# Patient Record
Sex: FEMALE | Race: WHITE | Employment: OTHER | URBAN - METROPOLITAN AREA
[De-identification: names, ages, dates, MRNs, and addresses within clinical notes are randomized per-mention and may not be internally consistent; named-entity substitution may affect disease eponyms.]

---

## 2017-03-07 ENCOUNTER — OFFICE VISIT (OUTPATIENT)
Dept: SURGERY | Age: 82
End: 2017-03-07

## 2017-03-07 DIAGNOSIS — Z41.1 ELECTIVE PROCEDURE FOR UNACCEPTABLE COSMETIC APPEARANCE: Primary | ICD-10-CM

## 2017-03-07 PROCEDURE — MISCP2 PLASTIC CONSULT II FOLLOW-UP: Performed by: DERMATOLOGY

## 2017-03-07 RX ORDER — TOLTERODINE 4 MG/1
CAPSULE, EXTENDED RELEASE ORAL
Refills: 11 | COMMUNITY
Start: 2016-11-28

## 2017-03-07 RX ORDER — OMEPRAZOLE 40 MG/1
CAPSULE, DELAYED RELEASE ORAL
COMMUNITY
Start: 2016-12-16

## 2017-03-07 RX ORDER — LINACLOTIDE 145 UG/1
CAPSULE, GELATIN COATED ORAL
Refills: 11 | COMMUNITY
Start: 2017-01-11

## 2017-10-02 ENCOUNTER — TELEPHONE (OUTPATIENT)
Dept: PAIN MANAGEMENT | Age: 82
End: 2017-10-02

## 2017-10-02 NOTE — TELEPHONE ENCOUNTER
This patients referral has been approved for scheduling with [facility].  First message was left for the patient to call [facility] regarding their referral.

## 2017-10-02 NOTE — TELEPHONE ENCOUNTER
New appointment has been scheduled. Address was confirmed and new patient packet along with appt reminder card was mailed to the patient. Informed patient the first appointment may only be rescheduled once and no showing or giving less than 24 hour notice of inability to come to first office visit will result in a cancellation of the referral. Advised that if they arrive to the appt without completed paperwork, their appointment may be rescheduled for the next available new patient spot.

## 2018-06-26 ENCOUNTER — TELEPHONE (OUTPATIENT)
Dept: SURGERY | Age: 83
End: 2018-06-26

## 2018-07-05 ENCOUNTER — PROCEDURE VISIT (OUTPATIENT)
Dept: SURGERY | Age: 83
End: 2018-07-05

## 2018-07-05 VITALS
TEMPERATURE: 97.7 F | DIASTOLIC BLOOD PRESSURE: 65 MMHG | HEART RATE: 87 BPM | WEIGHT: 140 LBS | OXYGEN SATURATION: 95 % | SYSTOLIC BLOOD PRESSURE: 124 MMHG | BODY MASS INDEX: 24.03 KG/M2

## 2018-07-05 DIAGNOSIS — L57.0 ACTINIC KERATOSIS: ICD-10-CM

## 2018-07-05 DIAGNOSIS — D04.39 SQUAMOUS CELL CARCINOMA IN SITU OF SKIN OF RIGHT CHEEK: Primary | ICD-10-CM

## 2018-07-05 PROCEDURE — 1123F ACP DISCUSS/DSCN MKR DOCD: CPT | Performed by: DERMATOLOGY

## 2018-07-05 PROCEDURE — G8420 CALC BMI NORM PARAMETERS: HCPCS | Performed by: DERMATOLOGY

## 2018-07-05 PROCEDURE — 17311 MOHS 1 STAGE H/N/HF/G: CPT | Performed by: DERMATOLOGY

## 2018-07-05 PROCEDURE — G8428 CUR MEDS NOT DOCUMENT: HCPCS | Performed by: DERMATOLOGY

## 2018-07-05 PROCEDURE — 1090F PRES/ABSN URINE INCON ASSESS: CPT | Performed by: DERMATOLOGY

## 2018-07-05 PROCEDURE — 99212 OFFICE O/P EST SF 10 MIN: CPT | Performed by: DERMATOLOGY

## 2018-07-05 PROCEDURE — 4040F PNEUMOC VAC/ADMIN/RCVD: CPT | Performed by: DERMATOLOGY

## 2018-07-05 PROCEDURE — 1036F TOBACCO NON-USER: CPT | Performed by: DERMATOLOGY

## 2018-07-05 PROCEDURE — 12052 INTMD RPR FACE/MM 2.6-5.0 CM: CPT | Performed by: DERMATOLOGY

## 2018-07-05 NOTE — PATIENT INSTRUCTIONS
[facility] Office Hours:    Monday-Thursday  7:30 AM-4:30 PM    Friday  9:00 AM-3:00 PM    Patient was given post operative instructions for dissolving sutures with liquid skin adhesive. All questions were reviewed.

## 2018-07-05 NOTE — PROGRESS NOTES
S: pt c/o multiple rough red spots on right cheek especially. She has had one IPL tx at another facility +to treat these but is interested in other options. O:  On the right cheek are multiple ill defined gritty pink papules. O  We discussed the relation to chronic cumulative sun exposure and the low premalignant potential.   Given the # of lesions, tx is recommended. We discussed various tx options including topical therapeutic agents such as efudex and carac as well as photodynamic therapy. Given number of lesions, the patient opts for PDT. She will call back in the fall to get put on the PDT schedule. rec 2 hour incubation and 2 tx total. . F/u 1 month after tx completion to determine need for any further tx based on outcome.
using the technique of Mohs. A map was prepared to correspond to the area of skin from which it was excised. Hemostasis was achieved using electrosurgery. The wound was bandaged. The tissue was prepared for the cryostat and sectioned. 1 section(s) prepared. Each section was coded, cut, and stained for microscopic examination. The entire base and margins of the excised piece of tissue were examined by the surgeon. No tumor was identified at the peripheral margins of stage I of microscopically controlled surgery. DEFECT MANAGEMENT:    REPAIR DESCRIPTION:  Various closure modalities were discussed with the patient, and it was decided that an intermediate layered repair would best preserve normal anatomic and functional relationships. Additional risk of wound dehiscence was discussed. The area was anesthetized with 1% lidocaine with epinephrine 1:100,000 buffered, was given a sterile prep using Chlorhexidine gluconate 4% solution and draped in the usual sterile fashion. Recreation and enlargement of the wound was performed by excising cones of tissue via the triangulation technique. The final incision lines were placed with respect for the patient's natural skin tension lines in a linear configuration to avoid functional and aesthetic distortion of adjacent free margins. Following minimal undermining, meticulous hemostasis was obtained with spot monopolar electrocoagulation. Subcutaneous dead space and dermis were closed using 5-0 Vicryl buried subcutaneous interrupted suture and the epidermis was approximated with 6-0 Fast absorbing gut running epidermal sutures. WOUND COVERAGE:  The wound was cleansed with normal saline and dried. Liquid skin adhesive was applied for wound protection and additional epidermal adhesion. The patient was given detailed oral and written information on post-operative care. There were no complications.   The patient left the Unit in good medical

## 2018-07-06 ENCOUNTER — TELEPHONE (OUTPATIENT)
Dept: SURGERY | Age: 83
End: 2018-07-06

## 2018-07-12 ENCOUNTER — TELEPHONE (OUTPATIENT)
Dept: SURGERY | Age: 83
End: 2018-07-12

## 2018-07-12 NOTE — TELEPHONE ENCOUNTER
Patient called office at 12:00 PM to clarify instructions for liquid skin adhesive. Patient was instructed to gently cleanse the area with a mild soap and allow adhesive to flake off on its own. Patient verbalized understanding of skin adhesive care.

## 2018-07-17 ENCOUNTER — OFFICE VISIT (OUTPATIENT)
Dept: SURGERY | Age: 83
End: 2018-07-17

## 2018-07-17 DIAGNOSIS — L90.5 SCAR CONDITION AND FIBROSIS OF SKIN: Primary | ICD-10-CM

## 2018-07-17 DIAGNOSIS — L57.0 ACTINIC KERATOSIS: ICD-10-CM

## 2018-07-17 DIAGNOSIS — L82.1 SEBORRHEIC KERATOSIS: ICD-10-CM

## 2018-07-17 PROBLEM — Z51.89 VISIT FOR WOUND CHECK: Status: ACTIVE | Noted: 2018-07-17

## 2018-07-17 PROCEDURE — 1036F TOBACCO NON-USER: CPT | Performed by: DERMATOLOGY

## 2018-07-17 PROCEDURE — 4040F PNEUMOC VAC/ADMIN/RCVD: CPT | Performed by: DERMATOLOGY

## 2018-07-17 PROCEDURE — 99212 OFFICE O/P EST SF 10 MIN: CPT | Performed by: DERMATOLOGY

## 2018-07-17 PROCEDURE — G8427 DOCREV CUR MEDS BY ELIG CLIN: HCPCS | Performed by: DERMATOLOGY

## 2018-07-17 PROCEDURE — 1090F PRES/ABSN URINE INCON ASSESS: CPT | Performed by: DERMATOLOGY

## 2018-07-17 PROCEDURE — 1123F ACP DISCUSS/DSCN MKR DOCD: CPT | Performed by: DERMATOLOGY

## 2018-07-17 PROCEDURE — G8420 CALC BMI NORM PARAMETERS: HCPCS | Performed by: DERMATOLOGY

## 2018-07-17 PROCEDURE — 1101F PT FALLS ASSESS-DOCD LE1/YR: CPT | Performed by: DERMATOLOGY

## 2018-07-17 NOTE — PATIENT INSTRUCTIONS
Wound Care Massaging Instruction    Starting at approximately two weeks following surgery, we often ask that our patients begin massaging their wound on a regular basis. We suggest 5 minutes every morning and 5 minutes every night using an emollient such as Aquaphor, Vaseline or Eucerin cream.  The sutures that were placed underneath the surface of the skin take about 70 days to dissolve, and during that time, they can cause lumps along the line of the scar. These lumps will eventually go away on their own, but the use of regular massage will help speed the process as well as help soften the scar tissue more quickly. Please continue to use sunscreen, SPF 45 or higher during this time.     [facility] Office Hours:    Monday-Thursday  7:30 AM-4:30 PM    Friday  9:00 AM-3:00 PM

## 2018-07-17 NOTE — PROGRESS NOTES
S: The patient is here today for wound/scar check. The patient had Mohs surgery located on the Right zygomatic with Intermediate layered closure repair, 1.5 week(s) ago. The patient c/o of concerns of a long scar line.     EXAM: ***    IMPRESSION/PLAN:  ***

## 2019-06-20 ENCOUNTER — TELEPHONE (OUTPATIENT)
Dept: ENDOCRINOLOGY | Age: 84
End: 2019-06-20

## 2019-06-20 NOTE — TELEPHONE ENCOUNTER
New patient referral. Please send a Health History Form to return to my attention and let them know I will look for appt day and time after receiving it. Thanks.     Referred by Dr. Dennise Morfin

## 2019-06-30 ENCOUNTER — TELEPHONE (OUTPATIENT)
Dept: ENDOCRINOLOGY | Age: 84
End: 2019-06-30

## 2019-08-29 ENCOUNTER — OFFICE VISIT (OUTPATIENT)
Dept: ENDOCRINOLOGY | Age: 84
End: 2019-08-29
Payer: MEDICARE

## 2019-08-29 VITALS
HEIGHT: 61 IN | SYSTOLIC BLOOD PRESSURE: 116 MMHG | BODY MASS INDEX: 25.83 KG/M2 | DIASTOLIC BLOOD PRESSURE: 70 MMHG | WEIGHT: 136.8 LBS

## 2019-08-29 DIAGNOSIS — M80.08XD VERTEBRAL FRACTURE, OSTEOPOROTIC, WITH ROUTINE HEALING, SUBSEQUENT ENCOUNTER: ICD-10-CM

## 2019-08-29 DIAGNOSIS — M81.0 OSTEOPOROSIS, POSTMENOPAUSAL: Primary | ICD-10-CM

## 2019-08-29 DIAGNOSIS — E55.9 VITAMIN D DEFICIENCY: ICD-10-CM

## 2019-08-29 PROCEDURE — 1123F ACP DISCUSS/DSCN MKR DOCD: CPT | Performed by: INTERNAL MEDICINE

## 2019-08-29 PROCEDURE — 1090F PRES/ABSN URINE INCON ASSESS: CPT | Performed by: INTERNAL MEDICINE

## 2019-08-29 PROCEDURE — G8419 CALC BMI OUT NRM PARAM NOF/U: HCPCS | Performed by: INTERNAL MEDICINE

## 2019-08-29 PROCEDURE — 4040F PNEUMOC VAC/ADMIN/RCVD: CPT | Performed by: INTERNAL MEDICINE

## 2019-08-29 PROCEDURE — 1036F TOBACCO NON-USER: CPT | Performed by: INTERNAL MEDICINE

## 2019-08-29 PROCEDURE — 99205 OFFICE O/P NEW HI 60 MIN: CPT | Performed by: INTERNAL MEDICINE

## 2019-08-29 PROCEDURE — G8427 DOCREV CUR MEDS BY ELIG CLIN: HCPCS | Performed by: INTERNAL MEDICINE

## 2019-08-29 NOTE — LETTER
[address] [facility]  [address] [facility] [address] [facility], [address] [place] [address],[place] [address]  Phone [phone]  Fax [phone]         2019         Jackie Springer MD                            Re:  Barbie Shearer,  1930    Dear Dr. Meena Love: Thank you for asking me to see Barbie Shearer in consultation. As you know, Ms. Marizol Bautista is a 80 y. o.woman diagnosed with osteoporosis some years ago. She has had multiple fractures. We reviewed life-style issues (calcium, vitamin D and physical activity). Without effective treatment, risk of future fracture is high. Several years ago she took Prolia but had a hiatus ~-2019 when she started back on Prolia at your office. I recommended that she continue  Prolia 60 mg SQ every 6 months and emphasized the need to stay on therapy indefinitely. Enclosed is a copy of my consultation note. Please let me know if you have any questions. Sincerely,    Galileo Arellaon MD     Encl.  Copy of consult note

## 2021-08-13 ENCOUNTER — HOSPITAL ENCOUNTER (EMERGENCY)
Age: 86
Discharge: HOME | End: 2021-08-13
Attending: STUDENT IN AN ORGANIZED HEALTH CARE EDUCATION/TRAINING PROGRAM
Payer: MEDICARE

## 2021-08-13 ENCOUNTER — APPOINTMENT (OUTPATIENT)
Dept: CT IMAGING | Age: 86
End: 2021-08-13
Payer: MEDICARE

## 2021-08-13 ENCOUNTER — APPOINTMENT (OUTPATIENT)
Dept: GENERAL RADIOLOGY | Age: 86
End: 2021-08-13
Payer: MEDICARE

## 2021-08-13 VITALS
HEART RATE: 73 BPM | RESPIRATION RATE: 19 BRPM | SYSTOLIC BLOOD PRESSURE: 135 MMHG | OXYGEN SATURATION: 99 % | TEMPERATURE: 97.8 F | DIASTOLIC BLOOD PRESSURE: 73 MMHG

## 2021-08-13 DIAGNOSIS — S09.90XA CLOSED HEAD INJURY, INITIAL ENCOUNTER: Primary | ICD-10-CM

## 2021-08-13 PROCEDURE — 99283 EMERGENCY DEPT VISIT LOW MDM: CPT

## 2021-08-13 PROCEDURE — 6370000000 HC RX 637 (ALT 250 FOR IP): Performed by: STUDENT IN AN ORGANIZED HEALTH CARE EDUCATION/TRAINING PROGRAM

## 2021-08-13 PROCEDURE — 73502 X-RAY EXAM HIP UNI 2-3 VIEWS: CPT

## 2021-08-13 PROCEDURE — 72125 CT NECK SPINE W/O DYE: CPT

## 2021-08-13 PROCEDURE — 70450 CT HEAD/BRAIN W/O DYE: CPT

## 2021-08-13 RX ORDER — ACETAMINOPHEN 325 MG/1
650 TABLET ORAL ONCE
Status: COMPLETED | OUTPATIENT
Start: 2021-08-13 | End: 2021-08-13

## 2021-08-13 RX ADMIN — ACETAMINOPHEN 650 MG: 325 TABLET ORAL at 15:10

## 2021-08-13 ASSESSMENT — ENCOUNTER SYMPTOMS
RESPIRATORY NEGATIVE: 1
VOMITING: 0
NAUSEA: 0
EYES NEGATIVE: 1

## 2021-08-13 ASSESSMENT — PAIN SCALES - GENERAL: PAINLEVEL_OUTOF10: 6

## 2021-08-13 NOTE — ED PROVIDER NOTES
ED Attending Attestation Note     Date of evaluation: 8/13/2021    This patient was seen by the advance practice provider. I have seen and examined the patient, agree with the workup, evaluation, management and diagnosis. The care plan has been discussed. My assessment reveals a well-appearing woman who provides a history of a mechanical fall due to a wastebasket in her bathroom. She did strike her head but is not anticoagulated. She denies any LOC or preceding symptoms before the fall. She has no numbness, weakness, vision changes, or paresthesias currently. She endorses pain at the head. My exam is notable for: GCS 15. AAOx4. Speech is clear and fluent, with no aphasia or dysarthria. Visual fields are full to confrontation. Pupils are 4->2 mm bilaterally, round and briskly reactive to light. EOMI with no nystagmus. Facial sensation is intact in all 3 divisions bilaterally. Face is symmetric with normal eye closure and smile. Hearing is grossly intact bilaterally. Palate elevates symmetrically. Phonation is normal.   She has intact strength to shoulder abduction, finger  b/l. She has intact strength to hip flexion/extension and ankle plantar/dorsiflexion. No pronator drift, LE AG [id number] b/l. SILT. Finger-to-nose normal bilaterally. We will proceed with imaging of areas with pain on secondary and, given advanced age and head strike, cross-sectional imaging of the head and neck. Please note this note was addended after inadvertent entry of unrelated text.        Vivian Paige MD  08/13/21 2128

## 2021-08-13 NOTE — ED PROVIDER NOTES
[facility]  EMERGENCY DEPARTMENT ENCOUNTER          NURSE PRACTITIONER NOTE       Date of evaluation: 2021    Chief Complaint     Fall (states she tripped and fell in her bathroom, states she hit her head on the bathroom floor. denies LOC, no anticoags. states her head is \"sore\")      History of Present Illness     Abbie Guevara is a 80 y.o. female past medical history as noted below; who presents to the emergency department with a complaint of a fall. Patient states that she tripped in her bathroom and fell striking her left head against the ground, and injuring her left hip. Patient initially had significant dizziness and was \"seeing spots\" she was able to crawl out of the bathroom, and get dressed, then was able to ambulate out to her family's car to bring her to the emergency department. She actually went to urgent care first, who instructed her to come to the emergency department as they cannot get advanced imaging. Patient denies anticoagulant use. Denies any current dizziness, lightheadedness, vision changes, nausea or vomiting. Denies numbness or tingling of extremities. Review of Systems     Review of Systems   Constitutional: Negative. Eyes: Negative. Respiratory: Negative. Cardiovascular: Negative. Gastrointestinal: Negative for nausea and vomiting. Musculoskeletal: Positive for arthralgias (left hip). Skin: Negative. Allergic/Immunologic: Negative for immunocompromised state. Neurological: Positive for dizziness (initially after fall, none currently) and headaches. Hematological: Does not bruise/bleed easily. Past Medical, Surgical, Family, and Social History     She has a past medical history of Cancer ([address] Hepatitis, Hyperlipidemia, and Osteoarthritis. She has a past surgical history that includes Mastectomy; Breast lumpectomy; and  section. Her family history includes Arthritis in an other family member; Cancer in her mother.   She reports that she has quit smoking. She has never used smokeless tobacco. She reports current alcohol use. She reports that she does not use drugs. Medications     Discharge Medication List as of 8/13/2021  4:13 PM      CONTINUE these medications which have NOT CHANGED    Details   LINZESS 145 MCG capsule TK 1 C PO QAM, R-11, DAWHistorical Med      omeprazole (PRILOSEC) 40 MG delayed release capsule Take one 30 minutes before breakfast, and 30 minutes before dinnerHistorical Med      tolterodine (DETROL LA) 4 MG extended release capsule TK 1 C PO D, R-11Historical Med      fluocinonide (LIDEX) 0.05 % external solution Apply sparingly to scalp qd prn., Disp-60 mL, R-2, Normal      naproxen (NAPROSYN) 500 MG tablet TAKE 1 TABLET BY MOUTH TWICE DAILY WITH MEALS, Disp-60 tablet, R-0      !! buPROPion SR (WELLBUTRIN SR) 150 MG SR tablet TAKE 1 TABLET BY MOUTH TWICE DAILY      PHENAZOPYRIDINE HCL PO Take by mouth      ! ! Mirabegron ER 25 MG TB24 Take 25 mg by mouth      !! mirtazapine (REMERON) 45 MG tablet TAKE 1 TABLET BY MOUTH EVERY NIGHT AT BEDTIME      !! simvastatin (ZOCOR) 20 MG tablet Take 20 mg by mouth      meloxicam (MOBIC) 15 MG tablet Take 1 tablet by mouth daily, Disp-30 tablet, R-0      Homeopathic Products (AZO CONFIDENCE PO) Take by mouth daily      oxyCODONE (ROXICODONE) 5 MG immediate release tablet Take 2 tablets by mouth every 6 hours as needed for Pain, Disp-30 tablet, R-0      !! buPROPion SR (WELLBUTRIN SR) 150 MG SR tablet daily , R-8      clonazePAM (KLONOPIN) 1 MG tablet Half tab at bedtime as needed      denosumab (PROLIA) 60 MG/ML SOLN SC injection Inject 60 mg into the skin      levothyroxine (SYNTHROID) 88 MCG tablet TAKE 1 TABLET BY MOUTH DAILY      hyoscyamine (LEVSIN/SL) 0.125 MG SL tablet Take 125 mcg by mouth      minoxidil (ROGAINE) 2 % external solution Apply twice daily, Historical Med      !!  Mirabegron ER (MYRBETRIQ) 25 MG TB24 Take 25 mg by mouth      !! mirtazapine (REMERON) 45 MG tablet TAKE 1 TABLET BY MOUTH EVERY NIGHT AT BEDTIME      sennosides-docusate sodium (SENOKOT-S) 8.6-50 MG tablet Take by mouth      !! simvastatin (ZOCOR) 20 MG tablet TAKE 1 TABLET BY MOUTH EVERY EVENING       ! ! - Potential duplicate medications found. Please discuss with provider. Allergies     She is allergic to no known allergies. Physical Exam     INITIAL VITALS: BP: 135/89, Temp: 97.8 °F (36.6 °C), Pulse: 73, Resp: 19, SpO2: 99 %   Physical Exam  Vitals and nursing note reviewed. Constitutional:       Appearance: Normal appearance. Eyes:      Extraocular Movements: Extraocular movements intact. Pupils: Pupils are equal, round, and reactive to light. Cardiovascular:      Rate and Rhythm: Normal rate and regular rhythm. Pulses: Normal pulses. Heart sounds: Normal heart sounds. Pulmonary:      Effort: Pulmonary effort is normal.      Breath sounds: Normal breath sounds. Musculoskeletal:         General: Normal range of motion. Neurological:      General: No focal deficit present. Mental Status: She is alert and oriented to person, place, and time. Cranial Nerves: No cranial nerve deficit. Psychiatric:         Mood and Affect: Mood normal.         Behavior: Behavior normal.           Diagnostic Results     RADIOLOGY:  CT CERVICAL SPINE WO CONTRAST   Final Result      Degenerative changes without acute traumatic normality. CT HEAD WO CONTRAST   Final Result      Left scalp contusion without intracranial hemorrhage or skull fracture. XR HIP 2-3 VW W PELVIS LEFT   Final Result      2 views demonstrate no fracture or dislocation. No significant arthritis. Right hip screws are noted. LABS:   No results found for this visit on 08/13/21.       RECENT VITALS:  BP: 135/73, Temp: 97.8 °F (36.6 °C), Pulse: 73, Resp: 19, SpO2: 99 %       ED Course     Nursing Notes, Past Medical Hx, Past Surgical Hx, Social Hx, Allergies, and Family Hx were

## 2022-09-11 ENCOUNTER — APPOINTMENT (OUTPATIENT)
Dept: CT IMAGING | Age: 87
End: 2022-09-11
Payer: MEDICARE

## 2022-09-11 ENCOUNTER — APPOINTMENT (OUTPATIENT)
Dept: GENERAL RADIOLOGY | Age: 87
End: 2022-09-11
Payer: MEDICARE

## 2022-09-11 ENCOUNTER — HOSPITAL ENCOUNTER (EMERGENCY)
Age: 87
Discharge: HOME | End: 2022-09-11
Attending: STUDENT IN AN ORGANIZED HEALTH CARE EDUCATION/TRAINING PROGRAM
Payer: MEDICARE

## 2022-09-11 VITALS
TEMPERATURE: 98 F | SYSTOLIC BLOOD PRESSURE: 148 MMHG | RESPIRATION RATE: 20 BRPM | HEART RATE: 85 BPM | OXYGEN SATURATION: 100 % | DIASTOLIC BLOOD PRESSURE: 88 MMHG

## 2022-09-11 DIAGNOSIS — S01.81XA LACERATION OF FOREHEAD, INITIAL ENCOUNTER: ICD-10-CM
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
## 2022-09-11 PROCEDURE — 72125 CT NECK SPINE W/O DYE: CPT

## 2022-09-11 PROCEDURE — 99284 EMERGENCY DEPT VISIT MOD MDM: CPT

## 2022-09-11 PROCEDURE — 70450 CT HEAD/BRAIN W/O DYE: CPT

## 2022-09-11 PROCEDURE — 12015 RPR F/E/E/N/L/M 7.6-12.5 CM: CPT

## 2022-09-11 PROCEDURE — 73562 X-RAY EXAM OF KNEE 3: CPT

## 2022-09-11 PROCEDURE — 2500000003 HC RX 250 WO HCPCS: Performed by: PHYSICIAN ASSISTANT

## 2022-09-11 RX ORDER — LIDOCAINE HYDROCHLORIDE 10 MG/ML
5 INJECTION, SOLUTION EPIDURAL; INFILTRATION; INTRACAUDAL; PERINEURAL ONCE
Status: COMPLETED | OUTPATIENT
Start: 2022-09-11 | End: 2022-09-11

## 2022-09-11 RX ADMIN — LIDOCAINE HYDROCHLORIDE 5 ML: 10 INJECTION, SOLUTION EPIDURAL; INFILTRATION; INTRACAUDAL; PERINEURAL at 17:47

## 2022-09-11 ASSESSMENT — ENCOUNTER SYMPTOMS
PHOTOPHOBIA: 0
ABDOMINAL PAIN: 0
NAUSEA: 0
BACK PAIN: 0
EYE PAIN: 0
COUGH: 0
DIARRHEA: 0
VOMITING: 0
SHORTNESS OF BREATH: 0

## 2022-09-11 ASSESSMENT — LIFESTYLE VARIABLES: HOW OFTEN DO YOU HAVE A DRINK CONTAINING ALCOHOL: 2-4 TIMES A MONTH

## 2022-09-11 NOTE — ED TRIAGE NOTES
Pt had mechanical fall where she tripped over her shoe and hit her head on the corner of the wall. Has head lac about 4 in long and 1/4in-3/4in wide. Wrapped applied.  C/o head and neck pain

## 2022-09-11 NOTE — ED PROVIDER NOTES
ED Attending Attestation Note     Date of evaluation: 9/11/2022    This patient was seen by the advance practice provider. I have seen and examined the patient, agree with the workup, evaluation, management and diagnosis. The care plan has been discussed. My assessment reveals a well-appearing 70-year-old female who presented after a mechanical fall at home. She tripped when the rubber sole of her shoe caught on the wood floor, and fell forward striking her head on the corner of a wall. She did not lose consciousness and is not anticoagulated. She has been ambulatory since the fall. She is complaining of some mild left knee pain as well as pain associated with a vertical laceration on her forehead. Her tetanus is up-to-date. I was present for the laceration repair as performed by the [facility].      Lena Duncan MD  09/11/22 0498

## 2022-09-11 NOTE — ED PROVIDER NOTES
section. Her family history includes Arthritis in an other family member; Cancer in her mother. She reports that she has quit smoking. She has never used smokeless tobacco. She reports current alcohol use of about 1.0 standard drink per week. She reports that she does not use drugs. Medications     Previous Medications    BUPROPION SR (WELLBUTRIN SR) 150 MG SR TABLET    daily     BUPROPION SR (WELLBUTRIN SR) 150 MG SR TABLET    TAKE 1 TABLET BY MOUTH TWICE DAILY    CLONAZEPAM (KLONOPIN) 1 MG TABLET    Half tab at bedtime as needed    DENOSUMAB (PROLIA) 60 MG/ML SOLN SC INJECTION    Inject 60 mg into the skin    FLUOCINONIDE (LIDEX) 0.05 % EXTERNAL SOLUTION    Apply sparingly to scalp qd prn.     HOMEOPATHIC PRODUCTS (AZO CONFIDENCE PO)    Take by mouth daily    HYOSCYAMINE (LEVSIN/SL) 0.125 MG SL TABLET    Take 125 mcg by mouth    LEVOTHYROXINE (SYNTHROID) 88 MCG TABLET    TAKE 1 TABLET BY MOUTH DAILY    LINZESS 145 MCG CAPSULE    TK 1 C PO QAM    MELOXICAM (MOBIC) 15 MG TABLET    Take 1 tablet by mouth daily    MINOXIDIL (ROGAINE) 2 % EXTERNAL SOLUTION    Apply twice daily    MIRABEGRON ER (MYRBETRIQ) 25 MG TB24    Take 25 mg by mouth    MIRABEGRON ER 25 MG TB24    Take 25 mg by mouth    MIRTAZAPINE (REMERON) 45 MG TABLET    TAKE 1 TABLET BY MOUTH EVERY NIGHT AT BEDTIME    MIRTAZAPINE (REMERON) 45 MG TABLET    TAKE 1 TABLET BY MOUTH EVERY NIGHT AT BEDTIME    NAPROXEN (NAPROSYN) 500 MG TABLET    TAKE 1 TABLET BY MOUTH TWICE DAILY WITH MEALS    OMEPRAZOLE (PRILOSEC) 40 MG DELAYED RELEASE CAPSULE    Take one 30 minutes before breakfast, and 30 minutes before dinner    OXYCODONE (ROXICODONE) 5 MG IMMEDIATE RELEASE TABLET    Take 2 tablets by mouth every 6 hours as needed for Pain    PHENAZOPYRIDINE HCL PO    Take by mouth    SENNOSIDES-DOCUSATE SODIUM (SENOKOT-S) 8.6-50 MG TABLET    Take by mouth    SIMVASTATIN (ZOCOR) 20 MG TABLET    TAKE 1 TABLET BY MOUTH EVERY EVENING    SIMVASTATIN (ZOCOR) 20 MG TABLET Take 20 mg by mouth    TOLTERODINE (DETROL LA) 4 MG EXTENDED RELEASE CAPSULE    TK 1 C PO D       Allergies     She is allergic to no known allergies. Physical Exam     INITIAL VITALS: BP: (!) 145/73, Temp: 98 °F (36.7 °C), Heart Rate: 85, Resp: 16, SpO2: 100 %  Physical Exam  Constitutional:       General: She is not in acute distress. Appearance: She is well-developed. HENT:      Head: Normocephalic and atraumatic. Eyes:      Pupils: Pupils are equal, round, and reactive to light. Cardiovascular:      Rate and Rhythm: Normal rate and regular rhythm. Heart sounds: No murmur heard. No friction rub. No gallop. Pulmonary:      Effort: Pulmonary effort is normal. No respiratory distress. Breath sounds: Normal breath sounds. Abdominal:      Palpations: Abdomen is soft. Tenderness: no abdominal tenderness   Musculoskeletal:         General: Normal range of motion. Cervical back: Normal range of motion and neck supple. Skin:     General: Skin is warm and dry. Comments: 8cm laceration to patients forehead. Wound hemostatic   Neurological:      Mental Status: She is alert and oriented to person, place, and time. Diagnostic Results     RADIOLOGY:  CT CSpine W/O Contrast   Final Result   1. No evidence for acute intracranial process. No bleed or shift   2. Frontal temporal atrophy            CT cervical spine without contrast      TECHNIQUE: Collimated helical images are made from the skull base through T1 in a bone algorithm. CT performed on a current scanner low-dose x-ray techniques. Findings CT cervical spine without contrast:      1.5 mm of anterolisthesis of C4 on C5 is identified. There is marked joint space during ossify formation throughout the cervical spine. The vertebral body heights well-maintained.  Evidence of slight retrolisthesis of the clivus relative to the odontoid,    however this configuration is stable when compared to prior sagittal views of Prepatellar bursitis   2. Tricompartmental osteoarthritis, greatest involving the lateral compartment   3. Atherosclerotic calcifications          LABS:   No results found for this visit on 09/11/22. RECENT VITALS:  BP: (!) 145/73, Temp: 98 °F (36.7 °C), Heart Rate: 85, Resp: 16, SpO2: 100 %     Procedures     Lac Repair    Date/Time: 9/11/2022 7:56 PM  Performed by: Kan Brooks PA-C  Authorized by:  Ragena Bernheim, MD     Consent:     Consent obtained:  Verbal    Consent given by:  Patient    Risks, benefits, and alternatives were discussed: yes      Risks discussed:  Infection, poor cosmetic result and need for additional repair  Universal protocol:     Procedure explained and questions answered to patient or proxy's satisfaction: yes      Relevant documents present and verified: yes      Test results available: yes      Imaging studies available: yes      Immediately prior to procedure, a time out was called: yes      Patient identity confirmed:  Verbally with patient, hospital-assigned identification number and arm band  Anesthesia:     Anesthesia method:  Local infiltration    Local anesthetic:  Lidocaine 1% w/o epi  Laceration details:     Location:  Face    Face location:  Forehead    Length (cm):  9  Pre-procedure details:     Preparation:  Patient was prepped and draped in usual sterile fashion  Exploration:     Hemostasis achieved with:  Direct pressure    Wound exploration: entire depth of wound visualized      Wound extent: muscle damage      Contaminated: no    Treatment:     Area cleansed with:  Chlorhexidine    Amount of cleaning:  Standard    Irrigation solution:  Sterile saline    Irrigation volume:  1L    Irrigation method:  Tap    Layers/structures repaired:  Muscle belly  Muscle belly:     Suture size:  5-0    Suture material:  Vicryl    Suture technique:  Simple interrupted    Number of sutures:  4  Skin repair:     Repair method:  Sutures    Suture size:  5-0    Suture material: Nylon    Suture technique:  Simple interrupted    Number of sutures:  10  Approximation:     Approximation:  Close  Repair type:     Repair type:  Simple  Post-procedure details:     Procedure completion:  Tolerated well, no immediate complications      ED Course     Nursing Notes, Past Medical Hx,Past Surgical Hx, Social Hx, Allergies, and Family Hx were reviewed. The patient was given the following medications:  Orders Placed This Encounter   Medications    lidocaine PF 1 % injection 5 mL       CONSULTS:  None    MEDICAL DECISION MAKING / ASSESSMENT / Jane Alysha is a 80 y.o. female who presents the emergency department after a mechanical fall. Vital signs stable on presentation remained stable throughout her stay. Thorough history and physical exam performed and detailed above. Patient presents the emergency department with a mechanical fall as detailed in HPI. She states that her shoe got caught on the hardwood floor and she fell forward, striking her head. Denies loss of consciousness. She endorses pain to her forehead and mild pain to her neck. She has also noticed some soreness to her left knee, although has been ambulatory. On physical exam she does have an 8 to 9 cm laceration in the middle of her forehead. She is able to raise eyebrows and close her eyes equally. Sensation intact throughout the entirety of the face. No bony tenderness throughout facial bones. No midline tenderness throughout cervical, thoracic, lumbar spine. Mild reproducible tenderness to bilateral trapezius muscles. She does have some mild tenderness to her left knee, although continues to have full range of motion. No other tenderness of extremities. No tenderness throughout chest wall or abdomen. CT of the head showed no evidence of acute intracranial process.   CT of the cervical spine showed slight retrolisthesis of the clivus relative to C2 dens, however stable from previous imaging therefore felt to be nonacute. X-ray of the left knee showed prepatellar bursitis without acute osseous abnormality. Laceration to the forehead was repaired as detailed above. Wound was thoroughly cleaned and irrigated with normal saline chlorhexidine prior to repair. 4 Vicryl sutures were used to reapproximate the frontalis muscle. 10 nylon sutures were then used to approximate the skin. Patient tolerated the procedure well without any complication. At this time, low suspicion for acute intracranial abnormality given reassuring imaging and normal neurologic examination. Low suspicion for acute osseous abnormality given benign physical exam and reassuring imaging. At this time, I do believe she is stable for discharge. She was told to follow-up in 5 to 7 days for removal of the sutures. Plan was thoroughly discussed with the patient and her son who are agreeable. They are given strict return precautions prior to discharge. This patient was also evaluated by the attending physician. All care plans were discussed and agreed upon. Clinical Impression     1. Fall, initial encounter    2.  Laceration of forehead, initial encounter        Disposition     PATIENT REFERRED TO:  Dulce Maria Crisostomo    Schedule an appointment as soon as possible for a visit       [facility] Emergency Department  [address] [place] [id number]  [phone]  Go to   If symptoms worsen    DISCHARGE MEDICATIONS:  New Prescriptions    No medications on file       DISPOSITION Decision To Discharge 09/11/2022 08:05:05 PM       Wilber Denis PA-C  09/11/22 2013

## 2023-01-12 ENCOUNTER — ANESTHESIA EVENT (OUTPATIENT)
Dept: OPERATING ROOM | Age: 88
End: 2023-01-12
Payer: MEDICARE

## 2023-01-12 ENCOUNTER — APPOINTMENT (OUTPATIENT)
Dept: GENERAL RADIOLOGY | Age: 88
DRG: 480 | End: 2023-01-12
Payer: MEDICARE

## 2023-01-12 ENCOUNTER — ANESTHESIA (OUTPATIENT)
Dept: OPERATING ROOM | Age: 88
End: 2023-01-12
Payer: MEDICARE

## 2023-01-12 ENCOUNTER — APPOINTMENT (OUTPATIENT)
Dept: CT IMAGING | Age: 88
DRG: 480 | End: 2023-01-12
Payer: MEDICARE

## 2023-01-12 ENCOUNTER — HOSPITAL ENCOUNTER (INPATIENT)
Age: 88
LOS: 11 days | Discharge: NURSING, REHAB OR LONG-TERM CARE | DRG: 480 | End: 2023-01-23
Attending: EMERGENCY MEDICINE | Admitting: INTERNAL MEDICINE
Payer: MEDICARE

## 2023-01-12 DIAGNOSIS — S72.002A CLOSED FRACTURE OF LEFT HIP, INITIAL ENCOUNTER (HCC): Primary | ICD-10-CM

## 2023-01-12 DIAGNOSIS — F41.9 ANXIETY: ICD-10-CM

## 2023-01-12 DIAGNOSIS — F51.01 PRIMARY INSOMNIA: ICD-10-CM

## 2023-01-12 PROBLEM — S72.8X2A OTHER FRACTURE OF LEFT FEMUR, INITIAL ENCOUNTER FOR CLOSED FRACTURE (HCC): Status: ACTIVE | Noted: 2023-01-12

## 2023-01-12 LAB
A/G RATIO: 1.8 (ref 1.1–2.2)
ALBUMIN SERPL-MCNC: 4.2 G/DL (ref 3.4–5)
ALP BLD-CCNC: 55 U/L (ref 40–129)
ALT SERPL-CCNC: 11 U/L (ref 10–40)
ANION GAP SERPL CALCULATED.3IONS-SCNC: 14 MMOL/L (ref 3–16)
ANION GAP SERPL CALCULATED.3IONS-SCNC: 14 MMOL/L (ref 3–16)
APTT: 38.2 SEC (ref 23–34.3)
AST SERPL-CCNC: 19 U/L (ref 15–37)
BASOPHILS ABSOLUTE: 0 K/UL (ref 0–0.2)
BASOPHILS RELATIVE PERCENT: 0.7 %
BILIRUB SERPL-MCNC: 1 MG/DL (ref 0–1)
BUN BLDV-MCNC: 10 MG/DL (ref 7–20)
BUN BLDV-MCNC: 13 MG/DL (ref 7–20)
CALCIUM SERPL-MCNC: 9.7 MG/DL (ref 8.3–10.6)
CALCIUM SERPL-MCNC: 9.9 MG/DL (ref 8.3–10.6)
CHLORIDE BLD-SCNC: 100 MMOL/L (ref 99–110)
CHLORIDE BLD-SCNC: 102 MMOL/L (ref 99–110)
CO2: 21 MMOL/L (ref 21–32)
CO2: 22 MMOL/L (ref 21–32)
CREAT SERPL-MCNC: 0.8 MG/DL (ref 0.6–1.2)
CREAT SERPL-MCNC: 0.8 MG/DL (ref 0.6–1.2)
EKG ATRIAL RATE: 73 BPM
EKG ATRIAL RATE: 89 BPM
EKG DIAGNOSIS: NORMAL
EKG DIAGNOSIS: NORMAL
EKG P AXIS: 58 DEGREES
EKG P AXIS: 62 DEGREES
EKG P-R INTERVAL: 136 MS
EKG P-R INTERVAL: 148 MS
EKG Q-T INTERVAL: 400 MS
EKG Q-T INTERVAL: 442 MS
EKG QRS DURATION: 120 MS
EKG QRS DURATION: 126 MS
EKG QTC CALCULATION (BAZETT): 486 MS
EKG QTC CALCULATION (BAZETT): 486 MS
EKG R AXIS: 100 DEGREES
EKG R AXIS: 83 DEGREES
EKG T AXIS: 46 DEGREES
EKG T AXIS: 64 DEGREES
EKG VENTRICULAR RATE: 73 BPM
EKG VENTRICULAR RATE: 89 BPM
EOSINOPHILS ABSOLUTE: 0 K/UL (ref 0–0.6)
EOSINOPHILS RELATIVE PERCENT: 0 %
GFR SERPL CREATININE-BSD FRML MDRD: >60 ML/MIN/{1.73_M2}
GFR SERPL CREATININE-BSD FRML MDRD: >60 ML/MIN/{1.73_M2}
GLUCOSE BLD-MCNC: 105 MG/DL (ref 70–99)
GLUCOSE BLD-MCNC: 106 MG/DL (ref 70–99)
HCT VFR BLD CALC: 44.3 % (ref 36–48)
HEMOGLOBIN: 15 G/DL (ref 12–16)
INR BLD: 1.1 (ref 0.87–1.14)
LACTIC ACID: 0.9 MMOL/L (ref 0.4–2)
LACTIC ACID: 3 MMOL/L (ref 0.4–2)
LV EF: 63 %
LVEF MODALITY: NORMAL
LYMPHOCYTES ABSOLUTE: 0.6 K/UL (ref 1–5.1)
LYMPHOCYTES RELATIVE PERCENT: 9.8 %
MAGNESIUM: 1.7 MG/DL (ref 1.8–2.4)
MCH RBC QN AUTO: 30.9 PG (ref 26–34)
MCHC RBC AUTO-ENTMCNC: 33.8 G/DL (ref 31–36)
MCV RBC AUTO: 91.3 FL (ref 80–100)
MONOCYTES ABSOLUTE: 0.5 K/UL (ref 0–1.3)
MONOCYTES RELATIVE PERCENT: 8.4 %
NEUTROPHILS ABSOLUTE: 5.2 K/UL (ref 1.7–7.7)
NEUTROPHILS RELATIVE PERCENT: 81.1 %
PDW BLD-RTO: 14.4 % (ref 12.4–15.4)
PHOSPHORUS: 4 MG/DL (ref 2.5–4.9)
PLATELET # BLD: 150 K/UL (ref 135–450)
PMV BLD AUTO: 8 FL (ref 5–10.5)
POTASSIUM REFLEX MAGNESIUM: 5.2 MMOL/L (ref 3.5–5.1)
POTASSIUM SERPL-SCNC: 4.1 MMOL/L (ref 3.5–5.1)
PROTHROMBIN TIME: 14.2 SEC (ref 11.7–14.5)
RBC # BLD: 4.85 M/UL (ref 4–5.2)
SODIUM BLD-SCNC: 135 MMOL/L (ref 136–145)
SODIUM BLD-SCNC: 138 MMOL/L (ref 136–145)
TOTAL PROTEIN: 6.6 G/DL (ref 6.4–8.2)
TROPONIN: <0.01 NG/ML
TROPONIN: <0.01 NG/ML
WBC # BLD: 6.5 K/UL (ref 4–11)

## 2023-01-12 PROCEDURE — 80048 BASIC METABOLIC PNL TOTAL CA: CPT

## 2023-01-12 PROCEDURE — 6360000002 HC RX W HCPCS

## 2023-01-12 PROCEDURE — 6360000002 HC RX W HCPCS: Performed by: STUDENT IN AN ORGANIZED HEALTH CARE EDUCATION/TRAINING PROGRAM

## 2023-01-12 PROCEDURE — 83735 ASSAY OF MAGNESIUM: CPT

## 2023-01-12 PROCEDURE — 3209999900 FLUORO FOR SURGICAL PROCEDURES

## 2023-01-12 PROCEDURE — 0QS706Z REPOSITION LEFT UPPER FEMUR WITH INTRAMEDULLARY INTERNAL FIXATION DEVICE, OPEN APPROACH: ICD-10-PCS | Performed by: STUDENT IN AN ORGANIZED HEALTH CARE EDUCATION/TRAINING PROGRAM

## 2023-01-12 PROCEDURE — 2720000010 HC SURG SUPPLY STERILE: Performed by: STUDENT IN AN ORGANIZED HEALTH CARE EDUCATION/TRAINING PROGRAM

## 2023-01-12 PROCEDURE — 3E0T3BZ INTRODUCTION OF ANESTHETIC AGENT INTO PERIPHERAL NERVES AND PLEXI, PERCUTANEOUS APPROACH: ICD-10-PCS | Performed by: ANESTHESIOLOGY

## 2023-01-12 PROCEDURE — 96376 TX/PRO/DX INJ SAME DRUG ADON: CPT

## 2023-01-12 PROCEDURE — 93005 ELECTROCARDIOGRAM TRACING: CPT | Performed by: INTERNAL MEDICINE

## 2023-01-12 PROCEDURE — 85730 THROMBOPLASTIN TIME PARTIAL: CPT

## 2023-01-12 PROCEDURE — 1200000000 HC SEMI PRIVATE

## 2023-01-12 PROCEDURE — 72170 X-RAY EXAM OF PELVIS: CPT

## 2023-01-12 PROCEDURE — 73552 X-RAY EXAM OF FEMUR 2/>: CPT

## 2023-01-12 PROCEDURE — 71045 X-RAY EXAM CHEST 1 VIEW: CPT

## 2023-01-12 PROCEDURE — 7100000001 HC PACU RECOVERY - ADDTL 15 MIN: Performed by: STUDENT IN AN ORGANIZED HEALTH CARE EDUCATION/TRAINING PROGRAM

## 2023-01-12 PROCEDURE — 2580000003 HC RX 258: Performed by: INTERNAL MEDICINE

## 2023-01-12 PROCEDURE — 76942 ECHO GUIDE FOR BIOPSY: CPT | Performed by: ANESTHESIOLOGY

## 2023-01-12 PROCEDURE — 2580000003 HC RX 258: Performed by: STUDENT IN AN ORGANIZED HEALTH CARE EDUCATION/TRAINING PROGRAM

## 2023-01-12 PROCEDURE — 93306 TTE W/DOPPLER COMPLETE: CPT

## 2023-01-12 PROCEDURE — 80053 COMPREHEN METABOLIC PANEL: CPT

## 2023-01-12 PROCEDURE — 2580000003 HC RX 258: Performed by: NURSE ANESTHETIST, CERTIFIED REGISTERED

## 2023-01-12 PROCEDURE — 2500000003 HC RX 250 WO HCPCS: Performed by: ANESTHESIOLOGY

## 2023-01-12 PROCEDURE — 7100000000 HC PACU RECOVERY - FIRST 15 MIN: Performed by: STUDENT IN AN ORGANIZED HEALTH CARE EDUCATION/TRAINING PROGRAM

## 2023-01-12 PROCEDURE — 6360000002 HC RX W HCPCS: Performed by: ANESTHESIOLOGY

## 2023-01-12 PROCEDURE — 3600000004 HC SURGERY LEVEL 4 BASE: Performed by: STUDENT IN AN ORGANIZED HEALTH CARE EDUCATION/TRAINING PROGRAM

## 2023-01-12 PROCEDURE — 84484 ASSAY OF TROPONIN QUANT: CPT

## 2023-01-12 PROCEDURE — 71260 CT THORAX DX C+: CPT

## 2023-01-12 PROCEDURE — 6360000002 HC RX W HCPCS: Performed by: NURSE ANESTHETIST, CERTIFIED REGISTERED

## 2023-01-12 PROCEDURE — 96374 THER/PROPH/DIAG INJ IV PUSH: CPT

## 2023-01-12 PROCEDURE — C1713 ANCHOR/SCREW BN/BN,TIS/BN: HCPCS | Performed by: STUDENT IN AN ORGANIZED HEALTH CARE EDUCATION/TRAINING PROGRAM

## 2023-01-12 PROCEDURE — 73502 X-RAY EXAM HIP UNI 2-3 VIEWS: CPT

## 2023-01-12 PROCEDURE — 6360000002 HC RX W HCPCS: Performed by: EMERGENCY MEDICINE

## 2023-01-12 PROCEDURE — 96375 TX/PRO/DX INJ NEW DRUG ADDON: CPT

## 2023-01-12 PROCEDURE — 99285 EMERGENCY DEPT VISIT HI MDM: CPT

## 2023-01-12 PROCEDURE — 2580000003 HC RX 258: Performed by: EMERGENCY MEDICINE

## 2023-01-12 PROCEDURE — 3600000014 HC SURGERY LEVEL 4 ADDTL 15MIN: Performed by: STUDENT IN AN ORGANIZED HEALTH CARE EDUCATION/TRAINING PROGRAM

## 2023-01-12 PROCEDURE — 2500000003 HC RX 250 WO HCPCS: Performed by: NURSE ANESTHETIST, CERTIFIED REGISTERED

## 2023-01-12 PROCEDURE — 72125 CT NECK SPINE W/O DYE: CPT

## 2023-01-12 PROCEDURE — 6370000000 HC RX 637 (ALT 250 FOR IP): Performed by: STUDENT IN AN ORGANIZED HEALTH CARE EDUCATION/TRAINING PROGRAM

## 2023-01-12 PROCEDURE — 84100 ASSAY OF PHOSPHORUS: CPT

## 2023-01-12 PROCEDURE — 85025 COMPLETE CBC W/AUTO DIFF WBC: CPT

## 2023-01-12 PROCEDURE — 83605 ASSAY OF LACTIC ACID: CPT

## 2023-01-12 PROCEDURE — 6360000004 HC RX CONTRAST MEDICATION: Performed by: EMERGENCY MEDICINE

## 2023-01-12 PROCEDURE — 85610 PROTHROMBIN TIME: CPT

## 2023-01-12 PROCEDURE — A4217 STERILE WATER/SALINE, 500 ML: HCPCS | Performed by: STUDENT IN AN ORGANIZED HEALTH CARE EDUCATION/TRAINING PROGRAM

## 2023-01-12 PROCEDURE — 6360000002 HC RX W HCPCS: Performed by: INTERNAL MEDICINE

## 2023-01-12 PROCEDURE — 70450 CT HEAD/BRAIN W/O DYE: CPT

## 2023-01-12 PROCEDURE — 36415 COLL VENOUS BLD VENIPUNCTURE: CPT

## 2023-01-12 PROCEDURE — 3700000000 HC ANESTHESIA ATTENDED CARE: Performed by: STUDENT IN AN ORGANIZED HEALTH CARE EDUCATION/TRAINING PROGRAM

## 2023-01-12 PROCEDURE — 93005 ELECTROCARDIOGRAM TRACING: CPT | Performed by: EMERGENCY MEDICINE

## 2023-01-12 PROCEDURE — 2709999900 HC NON-CHARGEABLE SUPPLY: Performed by: STUDENT IN AN ORGANIZED HEALTH CARE EDUCATION/TRAINING PROGRAM

## 2023-01-12 PROCEDURE — 3700000001 HC ADD 15 MINUTES (ANESTHESIA): Performed by: STUDENT IN AN ORGANIZED HEALTH CARE EDUCATION/TRAINING PROGRAM

## 2023-01-12 DEVICE — INTERTAN LAG/COMPRESSION SCREW KIT                                    95MM / 90MM
Type: IMPLANTABLE DEVICE | Site: HIP | Status: FUNCTIONAL
Brand: TRIGEN

## 2023-01-12 DEVICE — TRIGEN INTERTAN 11.5MM X 18CM 130DEGREE
Type: IMPLANTABLE DEVICE | Site: HIP | Status: FUNCTIONAL
Brand: TRIGEN

## 2023-01-12 DEVICE — INTERTAN LAG/COMPRESSION SCREW KIT                                    100MM / 95MM
Type: IMPLANTABLE DEVICE | Site: HIP | Status: FUNCTIONAL
Brand: TRIGEN

## 2023-01-12 DEVICE — TRIGEN LOW PROFILE SCREW 5.0MM X 32.5MM
Type: IMPLANTABLE DEVICE | Site: HIP | Status: FUNCTIONAL
Brand: TRIGEN

## 2023-01-12 RX ORDER — ONDANSETRON 2 MG/ML
4 INJECTION INTRAMUSCULAR; INTRAVENOUS EVERY 8 HOURS PRN
Status: DISCONTINUED | OUTPATIENT
Start: 2023-01-12 | End: 2023-01-23 | Stop reason: HOSPADM

## 2023-01-12 RX ORDER — ONDANSETRON 4 MG/1
4 TABLET, ORALLY DISINTEGRATING ORAL EVERY 8 HOURS PRN
Status: DISCONTINUED | OUTPATIENT
Start: 2023-01-12 | End: 2023-01-23 | Stop reason: HOSPADM

## 2023-01-12 RX ORDER — MORPHINE SULFATE 2 MG/ML
2 INJECTION, SOLUTION INTRAMUSCULAR; INTRAVENOUS ONCE
Status: COMPLETED | OUTPATIENT
Start: 2023-01-12 | End: 2023-01-12

## 2023-01-12 RX ORDER — SODIUM CHLORIDE 9 MG/ML
INJECTION, SOLUTION INTRAVENOUS PRN
Status: DISCONTINUED | OUTPATIENT
Start: 2023-01-12 | End: 2023-01-23 | Stop reason: HOSPADM

## 2023-01-12 RX ORDER — FENTANYL CITRATE 50 UG/ML
INJECTION, SOLUTION INTRAMUSCULAR; INTRAVENOUS
Status: COMPLETED
Start: 2023-01-12 | End: 2023-01-12

## 2023-01-12 RX ORDER — SIMETHICONE 80 MG
80 TABLET,CHEWABLE ORAL EVERY 6 HOURS PRN
Status: DISCONTINUED | OUTPATIENT
Start: 2023-01-12 | End: 2023-01-23 | Stop reason: HOSPADM

## 2023-01-12 RX ORDER — ACETAMINOPHEN 650 MG/1
650 SUPPOSITORY RECTAL EVERY 6 HOURS PRN
Status: DISCONTINUED | OUTPATIENT
Start: 2023-01-12 | End: 2023-01-23 | Stop reason: HOSPADM

## 2023-01-12 RX ORDER — OXYCODONE HYDROCHLORIDE 5 MG/1
5 TABLET ORAL PRN
Status: DISCONTINUED | OUTPATIENT
Start: 2023-01-12 | End: 2023-01-12 | Stop reason: HOSPADM

## 2023-01-12 RX ORDER — SODIUM CHLORIDE 0.9 % (FLUSH) 0.9 %
5-40 SYRINGE (ML) INJECTION PRN
Status: DISCONTINUED | OUTPATIENT
Start: 2023-01-12 | End: 2023-01-12 | Stop reason: HOSPADM

## 2023-01-12 RX ORDER — SODIUM CHLORIDE 0.9 % (FLUSH) 0.9 %
5-40 SYRINGE (ML) INJECTION EVERY 12 HOURS SCHEDULED
Status: DISCONTINUED | OUTPATIENT
Start: 2023-01-12 | End: 2023-01-23 | Stop reason: HOSPADM

## 2023-01-12 RX ORDER — OXYCODONE HYDROCHLORIDE 5 MG/1
10 TABLET ORAL PRN
Status: DISCONTINUED | OUTPATIENT
Start: 2023-01-12 | End: 2023-01-12 | Stop reason: HOSPADM

## 2023-01-12 RX ORDER — LABETALOL HYDROCHLORIDE 5 MG/ML
10 INJECTION, SOLUTION INTRAVENOUS
Status: DISCONTINUED | OUTPATIENT
Start: 2023-01-12 | End: 2023-01-12 | Stop reason: HOSPADM

## 2023-01-12 RX ORDER — OXYCODONE HYDROCHLORIDE 5 MG/1
10 TABLET ORAL EVERY 6 HOURS PRN
Status: DISCONTINUED | OUTPATIENT
Start: 2023-01-12 | End: 2023-01-12 | Stop reason: SDUPTHER

## 2023-01-12 RX ORDER — HEPARIN SODIUM 5000 [USP'U]/ML
5000 INJECTION, SOLUTION INTRAVENOUS; SUBCUTANEOUS EVERY 8 HOURS SCHEDULED
Status: DISCONTINUED | OUTPATIENT
Start: 2023-01-12 | End: 2023-01-14

## 2023-01-12 RX ORDER — OXYCODONE HYDROCHLORIDE 5 MG/1
5 TABLET ORAL EVERY 6 HOURS PRN
Status: DISCONTINUED | OUTPATIENT
Start: 2023-01-12 | End: 2023-01-12 | Stop reason: SDUPTHER

## 2023-01-12 RX ORDER — OXYCODONE HYDROCHLORIDE 5 MG/1
5 TABLET ORAL EVERY 6 HOURS PRN
Status: DISCONTINUED | OUTPATIENT
Start: 2023-01-12 | End: 2023-01-23 | Stop reason: HOSPADM

## 2023-01-12 RX ORDER — OXYCODONE HYDROCHLORIDE 5 MG/1
10 TABLET ORAL EVERY 6 HOURS PRN
Status: DISCONTINUED | OUTPATIENT
Start: 2023-01-12 | End: 2023-01-23 | Stop reason: HOSPADM

## 2023-01-12 RX ORDER — SODIUM CHLORIDE 0.9 % (FLUSH) 0.9 %
5-40 SYRINGE (ML) INJECTION PRN
Status: DISCONTINUED | OUTPATIENT
Start: 2023-01-12 | End: 2023-01-23 | Stop reason: HOSPADM

## 2023-01-12 RX ORDER — SODIUM CHLORIDE, SODIUM LACTATE, POTASSIUM CHLORIDE, CALCIUM CHLORIDE 600; 310; 30; 20 MG/100ML; MG/100ML; MG/100ML; MG/100ML
INJECTION, SOLUTION INTRAVENOUS CONTINUOUS
Status: DISCONTINUED | OUTPATIENT
Start: 2023-01-12 | End: 2023-01-16

## 2023-01-12 RX ORDER — QUETIAPINE FUMARATE 25 MG/1
25 TABLET, FILM COATED ORAL EVERY EVENING
Status: ON HOLD | COMMUNITY
Start: 2022-11-16 | End: 2023-01-23 | Stop reason: HOSPADM

## 2023-01-12 RX ORDER — FESOTERODINE FUMARATE 8 MG/1
1 TABLET, EXTENDED RELEASE ORAL DAILY
COMMUNITY
Start: 2022-11-09

## 2023-01-12 RX ORDER — SODIUM CHLORIDE 9 MG/ML
INJECTION, SOLUTION INTRAVENOUS PRN
Status: DISCONTINUED | OUTPATIENT
Start: 2023-01-12 | End: 2023-01-12 | Stop reason: SDUPTHER

## 2023-01-12 RX ORDER — POLYETHYLENE GLYCOL 3350 17 G/17G
17 POWDER, FOR SOLUTION ORAL DAILY PRN
Status: DISCONTINUED | OUTPATIENT
Start: 2023-01-12 | End: 2023-01-23 | Stop reason: HOSPADM

## 2023-01-12 RX ORDER — ONDANSETRON 2 MG/ML
4 INJECTION INTRAMUSCULAR; INTRAVENOUS
Status: DISCONTINUED | OUTPATIENT
Start: 2023-01-12 | End: 2023-01-12 | Stop reason: HOSPADM

## 2023-01-12 RX ORDER — FENTANYL CITRATE 50 UG/ML
25 INJECTION, SOLUTION INTRAMUSCULAR; INTRAVENOUS EVERY 5 MIN PRN
Status: DISCONTINUED | OUTPATIENT
Start: 2023-01-12 | End: 2023-01-12 | Stop reason: HOSPADM

## 2023-01-12 RX ORDER — MIDAZOLAM HYDROCHLORIDE 1 MG/ML
0.5 INJECTION INTRAMUSCULAR; INTRAVENOUS ONCE
Status: DISCONTINUED | OUTPATIENT
Start: 2023-01-12 | End: 2023-01-23 | Stop reason: HOSPADM

## 2023-01-12 RX ORDER — SODIUM CHLORIDE, SODIUM LACTATE, POTASSIUM CHLORIDE, CALCIUM CHLORIDE 600; 310; 30; 20 MG/100ML; MG/100ML; MG/100ML; MG/100ML
INJECTION, SOLUTION INTRAVENOUS CONTINUOUS PRN
Status: DISCONTINUED | OUTPATIENT
Start: 2023-01-12 | End: 2023-01-12 | Stop reason: SDUPTHER

## 2023-01-12 RX ORDER — LEVOTHYROXINE SODIUM 112 UG/1
112 TABLET ORAL
Status: DISCONTINUED | OUTPATIENT
Start: 2023-01-13 | End: 2023-01-23 | Stop reason: HOSPADM

## 2023-01-12 RX ORDER — GABAPENTIN 100 MG/1
500 CAPSULE ORAL EVERY EVENING
Status: ON HOLD | COMMUNITY
Start: 2022-01-05 | End: 2023-01-23 | Stop reason: HOSPADM

## 2023-01-12 RX ORDER — CLONAZEPAM 1 MG/1
1 TABLET ORAL NIGHTLY PRN
Status: DISCONTINUED | OUTPATIENT
Start: 2023-01-12 | End: 2023-01-23 | Stop reason: HOSPADM

## 2023-01-12 RX ORDER — ESMOLOL HYDROCHLORIDE 10 MG/ML
INJECTION INTRAVENOUS PRN
Status: DISCONTINUED | OUTPATIENT
Start: 2023-01-12 | End: 2023-01-12 | Stop reason: SDUPTHER

## 2023-01-12 RX ORDER — LEVOTHYROXINE SODIUM 112 UG/1
112 TABLET ORAL DAILY
COMMUNITY

## 2023-01-12 RX ORDER — FENTANYL CITRATE 50 UG/ML
25 INJECTION, SOLUTION INTRAMUSCULAR; INTRAVENOUS ONCE
Status: COMPLETED | OUTPATIENT
Start: 2023-01-12 | End: 2023-01-12

## 2023-01-12 RX ORDER — ROCURONIUM BROMIDE 10 MG/ML
INJECTION, SOLUTION INTRAVENOUS PRN
Status: DISCONTINUED | OUTPATIENT
Start: 2023-01-12 | End: 2023-01-12 | Stop reason: SDUPTHER

## 2023-01-12 RX ORDER — CEFAZOLIN SODIUM 2 G/50ML
SOLUTION INTRAVENOUS PRN
Status: DISCONTINUED | OUTPATIENT
Start: 2023-01-12 | End: 2023-01-12 | Stop reason: SDUPTHER

## 2023-01-12 RX ORDER — VIBEGRON 75 MG/1
1 TABLET, FILM COATED ORAL DAILY
COMMUNITY
Start: 2022-10-18

## 2023-01-12 RX ORDER — SODIUM CHLORIDE, SODIUM LACTATE, POTASSIUM CHLORIDE, AND CALCIUM CHLORIDE .6; .31; .03; .02 G/100ML; G/100ML; G/100ML; G/100ML
1000 INJECTION, SOLUTION INTRAVENOUS ONCE
Status: COMPLETED | OUTPATIENT
Start: 2023-01-12 | End: 2023-01-12

## 2023-01-12 RX ORDER — ATORVASTATIN CALCIUM 10 MG/1
10 TABLET, FILM COATED ORAL NIGHTLY
Status: DISCONTINUED | OUTPATIENT
Start: 2023-01-12 | End: 2023-01-23 | Stop reason: HOSPADM

## 2023-01-12 RX ORDER — ROPIVACAINE HYDROCHLORIDE 5 MG/ML
INJECTION, SOLUTION EPIDURAL; INFILTRATION; PERINEURAL PRN
Status: DISCONTINUED | OUTPATIENT
Start: 2023-01-12 | End: 2023-01-12 | Stop reason: SDUPTHER

## 2023-01-12 RX ORDER — SODIUM CHLORIDE 0.9 % (FLUSH) 0.9 %
5-40 SYRINGE (ML) INJECTION EVERY 12 HOURS SCHEDULED
Status: DISCONTINUED | OUTPATIENT
Start: 2023-01-12 | End: 2023-01-12 | Stop reason: HOSPADM

## 2023-01-12 RX ORDER — ONDANSETRON 2 MG/ML
INJECTION INTRAMUSCULAR; INTRAVENOUS PRN
Status: DISCONTINUED | OUTPATIENT
Start: 2023-01-12 | End: 2023-01-12 | Stop reason: SDUPTHER

## 2023-01-12 RX ORDER — LORAZEPAM 1 MG/1
1 TABLET ORAL NIGHTLY
Status: ON HOLD | COMMUNITY
Start: 2023-01-04 | End: 2023-01-23 | Stop reason: SDUPTHER

## 2023-01-12 RX ORDER — ACETAMINOPHEN 325 MG/1
650 TABLET ORAL EVERY 6 HOURS PRN
Status: DISCONTINUED | OUTPATIENT
Start: 2023-01-12 | End: 2023-01-23 | Stop reason: HOSPADM

## 2023-01-12 RX ORDER — LUBIPROSTONE 8 UG/1
8 CAPSULE, GELATIN COATED ORAL 2 TIMES DAILY WITH MEALS
COMMUNITY

## 2023-01-12 RX ORDER — PANTOPRAZOLE SODIUM 40 MG/1
40 TABLET, DELAYED RELEASE ORAL
Status: DISCONTINUED | OUTPATIENT
Start: 2023-01-13 | End: 2023-01-23 | Stop reason: HOSPADM

## 2023-01-12 RX ORDER — MAGNESIUM HYDROXIDE 1200 MG/15ML
LIQUID ORAL CONTINUOUS PRN
Status: DISCONTINUED | OUTPATIENT
Start: 2023-01-12 | End: 2023-01-12 | Stop reason: HOSPADM

## 2023-01-12 RX ORDER — SODIUM CHLORIDE 9 MG/ML
INJECTION, SOLUTION INTRAVENOUS PRN
Status: DISCONTINUED | OUTPATIENT
Start: 2023-01-12 | End: 2023-01-12 | Stop reason: HOSPADM

## 2023-01-12 RX ORDER — HYDRALAZINE HYDROCHLORIDE 20 MG/ML
10 INJECTION INTRAMUSCULAR; INTRAVENOUS
Status: DISCONTINUED | OUTPATIENT
Start: 2023-01-12 | End: 2023-01-12 | Stop reason: HOSPADM

## 2023-01-12 RX ORDER — OMEPRAZOLE 20 MG/1
20 CAPSULE, DELAYED RELEASE ORAL DAILY
COMMUNITY

## 2023-01-12 RX ORDER — FAMOTIDINE 20 MG/1
20 TABLET, FILM COATED ORAL 2 TIMES DAILY
Status: DISCONTINUED | OUTPATIENT
Start: 2023-01-12 | End: 2023-01-12

## 2023-01-12 RX ORDER — ESZOPICLONE 3 MG/1
3 TABLET, FILM COATED ORAL NIGHTLY PRN
Status: ON HOLD | COMMUNITY
Start: 2023-01-05 | End: 2023-01-23

## 2023-01-12 RX ORDER — MAGNESIUM SULFATE IN WATER 40 MG/ML
2000 INJECTION, SOLUTION INTRAVENOUS ONCE
Status: COMPLETED | OUTPATIENT
Start: 2023-01-12 | End: 2023-01-12

## 2023-01-12 RX ORDER — FENTANYL CITRATE 50 UG/ML
INJECTION, SOLUTION INTRAMUSCULAR; INTRAVENOUS PRN
Status: DISCONTINUED | OUTPATIENT
Start: 2023-01-12 | End: 2023-01-12 | Stop reason: SDUPTHER

## 2023-01-12 RX ORDER — PROPOFOL 10 MG/ML
INJECTION, EMULSION INTRAVENOUS PRN
Status: DISCONTINUED | OUTPATIENT
Start: 2023-01-12 | End: 2023-01-12 | Stop reason: SDUPTHER

## 2023-01-12 RX ADMIN — ROPIVACAINE HYDROCHLORIDE 30 ML: 5 INJECTION, SOLUTION EPIDURAL; INFILTRATION; PERINEURAL at 18:30

## 2023-01-12 RX ADMIN — ROCURONIUM BROMIDE 50 MG: 10 INJECTION INTRAVENOUS at 17:18

## 2023-01-12 RX ADMIN — MORPHINE SULFATE 2 MG: 2 INJECTION, SOLUTION INTRAMUSCULAR; INTRAVENOUS at 10:23

## 2023-01-12 RX ADMIN — TRANEXAMIC ACID 1000 MG: 100 INJECTION, SOLUTION INTRAVENOUS at 17:41

## 2023-01-12 RX ADMIN — HEPARIN SODIUM 5000 UNITS: 5000 INJECTION INTRAVENOUS; SUBCUTANEOUS at 21:35

## 2023-01-12 RX ADMIN — HEPARIN SODIUM 5000 UNITS: 5000 INJECTION INTRAVENOUS; SUBCUTANEOUS at 16:14

## 2023-01-12 RX ADMIN — SODIUM CHLORIDE, SODIUM LACTATE, POTASSIUM CHLORIDE, AND CALCIUM CHLORIDE: .6; .31; .03; .02 INJECTION, SOLUTION INTRAVENOUS at 18:38

## 2023-01-12 RX ADMIN — HYDROMORPHONE HYDROCHLORIDE 0.5 MG: 1 INJECTION, SOLUTION INTRAMUSCULAR; INTRAVENOUS; SUBCUTANEOUS at 16:29

## 2023-01-12 RX ADMIN — SODIUM CHLORIDE, SODIUM LACTATE, POTASSIUM CHLORIDE, AND CALCIUM CHLORIDE: .6; .31; .03; .02 INJECTION, SOLUTION INTRAVENOUS at 17:15

## 2023-01-12 RX ADMIN — MAGNESIUM SULFATE HEPTAHYDRATE 2000 MG: 40 INJECTION, SOLUTION INTRAVENOUS at 16:13

## 2023-01-12 RX ADMIN — FENTANYL CITRATE 25 MCG: 0.05 INJECTION, SOLUTION INTRAMUSCULAR; INTRAVENOUS at 08:49

## 2023-01-12 RX ADMIN — PHENYLEPHRINE HYDROCHLORIDE 200 MCG: 10 INJECTION, SOLUTION INTRAMUSCULAR; INTRAVENOUS; SUBCUTANEOUS at 17:31

## 2023-01-12 RX ADMIN — CEFAZOLIN SODIUM 2000 MG: 2 SOLUTION INTRAVENOUS at 17:28

## 2023-01-12 RX ADMIN — FENTANYL CITRATE 100 MCG: 50 INJECTION, SOLUTION INTRAMUSCULAR; INTRAVENOUS at 17:28

## 2023-01-12 RX ADMIN — FENTANYL CITRATE 25 MCG: 0.05 INJECTION, SOLUTION INTRAMUSCULAR; INTRAVENOUS at 08:55

## 2023-01-12 RX ADMIN — SODIUM CHLORIDE, POTASSIUM CHLORIDE, SODIUM LACTATE AND CALCIUM CHLORIDE 1000 ML: 600; 310; 30; 20 INJECTION, SOLUTION INTRAVENOUS at 12:37

## 2023-01-12 RX ADMIN — SODIUM CHLORIDE, PRESERVATIVE FREE 10 ML: 5 INJECTION INTRAVENOUS at 21:07

## 2023-01-12 RX ADMIN — SUGAMMADEX 200 MG: 100 INJECTION, SOLUTION INTRAVENOUS at 18:11

## 2023-01-12 RX ADMIN — ATORVASTATIN CALCIUM 10 MG: 10 TABLET, FILM COATED ORAL at 21:08

## 2023-01-12 RX ADMIN — MORPHINE SULFATE 2 MG: 2 INJECTION, SOLUTION INTRAMUSCULAR; INTRAVENOUS at 09:13

## 2023-01-12 RX ADMIN — PROPOFOL 80 MG: 10 INJECTION, EMULSION INTRAVENOUS at 17:18

## 2023-01-12 RX ADMIN — SODIUM CHLORIDE, POTASSIUM CHLORIDE, SODIUM LACTATE AND CALCIUM CHLORIDE 1000 ML: 600; 310; 30; 20 INJECTION, SOLUTION INTRAVENOUS at 13:15

## 2023-01-12 RX ADMIN — ONDANSETRON 4 MG: 2 INJECTION INTRAMUSCULAR; INTRAVENOUS at 17:15

## 2023-01-12 RX ADMIN — IOPAMIDOL 75 ML: 755 INJECTION, SOLUTION INTRAVENOUS at 10:48

## 2023-01-12 RX ADMIN — FENTANYL CITRATE 25 MCG: 50 INJECTION, SOLUTION INTRAMUSCULAR; INTRAVENOUS at 08:49

## 2023-01-12 RX ADMIN — SODIUM CHLORIDE, SODIUM LACTATE, POTASSIUM CHLORIDE, AND CALCIUM CHLORIDE 1000 ML: .6; .31; .03; .02 INJECTION, SOLUTION INTRAVENOUS at 11:35

## 2023-01-12 RX ADMIN — PROPOFOL 50 MG: 10 INJECTION, EMULSION INTRAVENOUS at 17:21

## 2023-01-12 RX ADMIN — ESMOLOL HYDROCHLORIDE 20 MG: 10 INJECTION, SOLUTION INTRAVENOUS at 17:28

## 2023-01-12 RX ADMIN — HYDROMORPHONE HYDROCHLORIDE 0.25 MG: 1 INJECTION, SOLUTION INTRAMUSCULAR; INTRAVENOUS; SUBCUTANEOUS at 11:32

## 2023-01-12 ASSESSMENT — PAIN SCALES - GENERAL
PAINLEVEL_OUTOF10: 10
PAINLEVEL_OUTOF10: 0
PAINLEVEL_OUTOF10: 10
PAINLEVEL_OUTOF10: 0
PAINLEVEL_OUTOF10: 0
PAINLEVEL_OUTOF10: 9

## 2023-01-12 ASSESSMENT — PAIN DESCRIPTION - ONSET
ONSET: SUDDEN
ONSET: SUDDEN

## 2023-01-12 ASSESSMENT — PAIN DESCRIPTION - PAIN TYPE
TYPE: ACUTE PAIN
TYPE: ACUTE PAIN

## 2023-01-12 ASSESSMENT — PAIN DESCRIPTION - DESCRIPTORS
DESCRIPTORS: SHOOTING
DESCRIPTORS: PATIENT UNABLE TO DESCRIBE

## 2023-01-12 ASSESSMENT — PAIN DESCRIPTION - ORIENTATION
ORIENTATION: LEFT

## 2023-01-12 ASSESSMENT — PAIN DESCRIPTION - LOCATION
LOCATION: HIP

## 2023-01-12 ASSESSMENT — PAIN - FUNCTIONAL ASSESSMENT
PAIN_FUNCTIONAL_ASSESSMENT: INTOLERABLE, UNABLE TO DO ANY ACTIVE OR PASSIVE ACTIVITIES
PAIN_FUNCTIONAL_ASSESSMENT: 0-10

## 2023-01-12 ASSESSMENT — PAIN DESCRIPTION - DIRECTION: RADIATING_TOWARDS: DOWN LEFT LEG TO KNEE

## 2023-01-12 ASSESSMENT — PAIN DESCRIPTION - FREQUENCY
FREQUENCY: CONTINUOUS
FREQUENCY: CONTINUOUS

## 2023-01-12 ASSESSMENT — LIFESTYLE VARIABLES: SMOKING_STATUS: 0

## 2023-01-12 NOTE — H&P
Hospital Medicine History & Physical      PCP: Sapna Mendez    Date of Admission: 1/12/2023    Date of Service: 01/12/2023    Chief Complaint:  left hip pain after fall. History Of Present Illness: This is a 81 yo Female, with history of bilateral breast cancer, s/p left mastectomy 1975, right lumpectomy and radiation in 1999, DVT 2009 while on [place], hypothyroidism, esophageal spasm, schatzki's ring, EGD with dilation 1/2011, GERD, hepatitis B, OA, spinal stenosis, Rt Hip Fracture s/p surgery 2/2010, Thoracic and Lumbar compression fractures, who presents for evaluation after fall. She was found to have sustained mildly displaced comminuted intratrochanteric fracture of the left femur. Patient is alert, oriented, and coherent. Daughter in-law, Julio Gloria, is present and assists with clinical history. This morning patient walked from her bedroom to the bathroom. When she reached the bathroom, she felt lightheaded. States that she must have turned around, lost her balance, and fell onto her left side. After falling she was in significant pain and was unable to rise on her own. Patient has a  who comes every morning to walk her dog at ~ 730 AM.    Knowing that her  was coming, patient screamed out for help. Reportedly she was found likely on her left side on the bathroom floor. Her  called EMS and patient was taken to [facility] for evaluation. Patient has been in her usual state of health. She walks about 1 mile daily without assistance. She used to walk longer distances. Patient denies recent fevers, chills, cough, shortness of breath, chest pain, palpitations at rest and on exertion. She denies history of nausea, vomiting, abdominal pain, dysuria, diarrhea. She eats three meals daily. States that she does not eat much during lunchtime.      Daughter in-law, Julio Gloria said patient had another fall in February 2022 while walking her dog; she was found on the side walk. After, she has been working with PT/OT to work on her strenght and stability. She may have fallen a couple of times after. Past Medical History:          Diagnosis Date    Cancer ([address]     Hepatitis     Hyperlipidemia     Osteoarthritis        Past Surgical History:          Procedure Laterality Date    BREAST LUMPECTOMY      Right     SECTION      MASTECTOMY      Left     Hysterectomy 2011  [place] Fracture surgery 2002  Rt Hip Fracture s/p surgery 2010    Medications Prior to Admission:      Prior to Admission medications    Medication Sig Start Date End Date Taking? Authorizing Provider   LORazepam (ATIVAN) 1 MG tablet Take 1 mg by mouth nightly.  23   Historical Provider, MD   LINZESS 145 MCG capsule TK 1 C PO QAM 17   Historical Provider, MD   tolterodine (DETROL LA) 4 MG extended release capsule TK 1 C PO D 16   Historical Provider, MD   fluocinonide (LIDEX) 0.05 % external solution Apply sparingly to scalp qd prn. 16   Shruthi Estela, MD   buPROPion SR (WELLBUTRIN SR) 150 MG SR tablet TAKE 1 TABLET BY MOUTH TWICE DAILY 11/30/15   Historical Provider, MD   Mirabegron ER 25 MG TB24 Take 25 mg by mouth 16   Historical Provider, MD   mirtazapine (REMERON) 45 MG tablet TAKE 1 TABLET BY MOUTH EVERY NIGHT AT BEDTIME 16   Historical Provider, MD   simvastatin (ZOCOR) 20 MG tablet Take 20 mg by mouth 16   Historical Provider, MD   meloxicam (MOBIC) 15 MG tablet Take 1 tablet by mouth daily  Patient not taking: Reported on 2019   Tello Prakash MD   Homeopathic Products (AZO CONFIDENCE PO) Take by mouth daily    Historical Provider, MD   buPROPion SR [facility] SR) 150 MG SR tablet daily  9/26/15   Historical Provider, MD   denosumab (PROLIA) 60 MG/ML SOLN SC injection Inject 60 mg into the skin 4/16/15   Historical Provider, MD   levothyroxine (SYNTHROID) 112 MCG tablet Take 112 mcg by mouth Daily    Historical Provider, MD   hyoscyamine (LEVSIN/SL) 0.125 MG SL tablet Take 125 mcg by mouth 2/7/14   Historical Provider, MD   minoxidil (ROGAINE) 2 % external solution Apply twice daily 12/12/14   Historical Provider, MD   Mirabegron ER (MYRBETRIQ) 25 MG TB24 Take 25 mg by mouth 6/1/15   Historical Provider, MD   mirtazapine (REMERON) 45 MG tablet TAKE 1 TABLET BY MOUTH EVERY NIGHT AT BEDTIME 2/6/15   Historical Provider, MD   sennosides-docusate sodium (SENOKOT-S) 8.6-50 MG tablet Take by mouth 1/24/13   Historical Provider, MD   simvastatin (ZOCOR) 20 MG tablet TAKE 1 TABLET BY MOUTH EVERY EVENING 10/29/14   Historical Provider, MD       Allergies:  Patient has no known allergies. Social History:      The patient currently lives independently in her own one floor condominium. Patient walks independently without need for an assistive device. TOBACCO:   reports that she has quit smoking. She has never used smokeless tobacco.  Quit smoking > 30 years. Started in her teenage years, smoked 1/4 ppd. ETOH:   reports current alcohol use of about 1.0 standard drink per week. States that she does not drink much. Family History:      Not relevant to her medical history at this time of her life.         Problem Relation Age of Onset    Cancer Mother         Breat     Arthritis Other        REVIEW OF SYSTEMS:   refer to HPI    PHYSICAL EXAM PERFORMED:    BP (!) 155/76   Pulse 98   Temp 97.7 °F (36.5 °C)   Resp 16   Ht 5' 1\" (1.549 m)   Wt 147 lb 0.8 oz (66.7 kg)   SpO2 100%   BMI 27.78 kg/m²       GEN alert, in distressed about her pain  HEENT normocephalic, anicteric sclera, EOMI, mucosa moist, no stridor  NECK supple, trachea midline  RESP on RA, in no distress, clear to auscultation  CARDS RRR, S1, S2, systolic murmurs over RUSB, LUSB, no edema, radial pulse 2+, DP pulse 2+  ABD distended, +BS, soft nontender  MSK left hip tenderness, no cyanosis, no clubbing  SKIN warm, dry  NEURO alert, oriented x 3, no facial asymmetry, no dysarthria, moving spontaneously  PSYCH normal mood    Bladder scan showed > 500cc of urine. Labs:     Recent Labs     01/12/23 0912   WBC 6.5   HGB 15.0   HCT 44.3        Recent Labs     01/12/23 0912   *   K 5.2*      CO2 21   BUN 13   CREATININE 0.8   CALCIUM 9.9     No results for input(s): AST, ALT, BILIDIR, BILITOT, ALKPHOS in the last 72 hours. Recent Labs     01/12/23 0912   INR 1.10     No results for input(s): Arleen Kras in the last 72 hours. Urinalysis:    No results found for: Ryan LaraRenetta Professor Noel James [facility] [address] [facility], [facility], [facility], [facility] [address]    Radiology:     CXR: I have reviewed the CXR with the following interpretation:   EKG: NSR, HR 89, possible left atrial enlargement, RBBB,     CT CHEST ABDOMEN PELVIS W CONTRAST Additional Contrast? None   Final Result      CHEST:   1.  Small right pleural effusion and trace left pleural effusion. Mild bibasilar atelectasis. 2.  Stable remote compression deformities at T12 and L1 compared to prior MRI from 2015. There is also new compression deformity at T11 compared to the study, but this is age indeterminant and may also be remote. Recommend correlation with point    tenderness in this level. 3.  Motion limited exam without definite evidence of acute traumatic intrathoracic abnormality. 4.  Moderate hiatal hernia. ABDOMEN AND PELVIS:   1.  Mildly displaced comminuted intratrochanteric fracture of the left femur. 2.  Surrounding stranding/hematoma in the left thigh as detailed above. 3.  No additional acute traumatic abnormality identified in the abdomen and pelvis per         CT CERVICAL SPINE WO CONTRAST   Final Result   1. No acute traumatic abnormality of the cervical spine. 2. Stable multilevel degenerative disc disease and facet arthropathy. 3. Stable slight rotatory subluxation of C1 on C2 on the right side. CT HEAD WO CONTRAST   Final Result      1.  Stable exam with no acute intracranial abnormality. XR FEMUR LEFT (MIN 2 VIEWS)   Final Result   Addendum (preliminary) 1 of 1   [ADDENDUM #1    Addendum: Initial report by Dr. Kamila Valentine. Addendum by Dr. Ki Brar. Frontal view of the pelvis and frontal and frog-leg lateral views of the    left femur were obtained. There is a nondisplaced intertrochanteric    fracture of the left femur. No dislocation. Orthopedic hardware of the    proximal right femur is noted. No    additional fracture deformity. Final   Nondisplaced intertrochanteric fracture on the left. IMPRESSION:      No pneumothorax. Diffusely prominent interstitial markings presumably representing chronic interstitial lung disease. No prior for comparison. Normal cardiomediastinal silhouette. Presumed old fracture deformity of the proximal left humerus. If there is left shoulder pain then dedicated radiographs are indicated. XR PELVIS (1-2 VIEWS)   Final Result   Addendum (preliminary) 1 of 1   [ADDENDUM #1    Addendum: Initial report by Dr. Kamila Valentine. Addendum by Dr. Ki Brar. Frontal view of the pelvis and frontal and frog-leg lateral views of the    left femur were obtained. There is a nondisplaced intertrochanteric    fracture of the left femur. No dislocation. Orthopedic hardware of the    proximal right femur is noted. No    additional fracture deformity. Final   Nondisplaced intertrochanteric fracture on the left. IMPRESSION:      No pneumothorax. Diffusely prominent interstitial markings presumably representing chronic interstitial lung disease. No prior for comparison. Normal cardiomediastinal silhouette. Presumed old fracture deformity of the proximal left humerus. If there is left shoulder pain then dedicated radiographs are indicated. XR CHEST PORTABLE   Final Result   Addendum (preliminary) 1 of 1   [ADDENDUM #1    Addendum: Initial report by Dr. Kamila Valentine. Addendum by Dr. Ki Brar.       Frontal view of the pelvis and frontal and frog-leg lateral views of the    left femur were obtained. There is a nondisplaced intertrochanteric    fracture of the left femur. No dislocation. Orthopedic hardware of the    proximal right femur is noted. No    additional fracture deformity. Final   Nondisplaced intertrochanteric fracture on the left. IMPRESSION:      No pneumothorax. Diffusely prominent interstitial markings presumably representing chronic interstitial lung disease. No prior for comparison. Normal cardiomediastinal silhouette. Presumed old fracture deformity of the proximal left humerus. If there is left shoulder pain then dedicated radiographs are indicated. ASSESSMENT/PLAN:    Active Hospital Problems    Diagnosis Date Noted    Other fracture of left femur, initial encounter for closed fracture [facility] [S72.8X2A] 01/12/2023     Priority: Medium       Mildly Displaced Comminuted Intra-trochanteric Fracture of Left Femur  - CT A/P with contrast 1/12/23 showed mildly displaced comminuted intratrochanteric fracture of the left femur. Surrounding stranding/hematoma in the left thigh as detailed above. - s/p ORIF of left femoral fracture with CMN on 1/12/23  - monitor for post-op complications. - Pain control -- sensitive to narcotics. - Bowel regimen. Recurrent Falls  Lightheadedness  - walks 1 mile daily without assistive device. - Had a fall in February 2022 while walking her dog.  - she may have fallen a couple more times after, then today 1/12. -- prior to falling today -- she felt lightheaded, turned and lost her balance resulting in her fall. -- adm labs showed elevated lactic acid and ECHO showed severe aortic stenosis. - CT of Head wo contrast 1/12 showed no acute intracranial process. - CT Cervical Spine wo contrast 1/12 showed no acute traumatic abnormality of the cervical spine. Stable multilevel degenerative disc disease and facet arthropathy.   Stable slight rotatory subluxation of C1 on C2 on the right side.  - B12, folate, vitamin D5OH. Elevated Lactic Acid  - adm lactic acid 3.0.  - Administered 2 L LR, then started LR infusion 75 cc/hr. - lactic acid normalized after receiving IVF's. Urinary Retention  - bladder scan showed > 500 cc urine.    - attempted marrero placement by RN staff, unsuccessful.  - consulted urology. LV with Grade II Diastolic Dysfunction  Severe Aortic Stenosis, Symptomatic  Mild to Moderate Mitral Regurgitation  Mild to Moderate Mitral Stenosis  - ECHO 1/12/23 showed normal LV cavity size, wall thickness, LVEF 60-65%, no regional wall motion abnormalities. Grade II diastolic dysfunction. Mitral valve leaflets are thickened/calcified. MAC. Mild to moderate MR and MS. Severe aortic stenosis with peak velocity of 5.3 m/s, mean pressure gradient of 66 mmHg. Mild TR. PSAP 48 mmHg. - Reports lightheadedness on rising. When she fell on 1/12 -- when she walked to the bathroom she was not initially lightheaded; after reaching the bathroom she felt lightheaded. She denies shortness of breath, chest pain, palpitations on exertion. - cardiology consulted for consideration of TAVR. Patient extremely functional; walks 1 mile daily without assistive device. Small Right Pleural Effusion  Trace Left Pleural Effusion  - in the setting of valvular disease and LV with grade II diastolic dysfunction    Hypothyroidism  - Continue home levothyroxine 112 mcg daily.  - TSH, free T4 (pharmacy verified that she has not filled her prescription in several months.)    Remote Compression Deformities at T12, L1  New Compression Deformity at T11  OA  - vitamin D25OH level. History of Rt Hip Fracture   - s/p surgery 2/2010    History of bilateral breast cancer  - s/p left mastectomy 1975  - s/p right lumpectomy and radiation in 1999    DVT 2009   - thought possible related to [place]  - DVT ppx.     Moderate Hiatal Hernia  GERD  History of Esophageal spasm  History of Schatzki's ring  - S/p EGD with dilation 1/2011  - Continue home omeprazole 40 mg daily. History of Hepatitis B      DVT Prophylaxis: SC heparin  Diet: Diet NPO  Code Status: Full Code    PT/OT Eval Status: PT/OT    Dispo - likely to SNF after medically stable. Lio Sinclair MD    Thank you Sarabjit Alexis for the opportunity to be involved in this patient's care. If you have any questions or concerns please feel free to contact me at [phone].

## 2023-01-12 NOTE — ANESTHESIA PROCEDURE NOTES
Peripheral Block    Patient location during procedure: OR  Reason for block: procedure for pain, post-op pain management and at surgeon's request  Start time: 1/12/2023 6:55 PM  Staffing  Performed: anesthesiologist   Preanesthetic Checklist  Completed: patient identified, IV checked, site marked, risks and benefits discussed, surgical/procedural consents, equipment checked, pre-op evaluation, timeout performed, anesthesia consent given, oxygen available, monitors applied/VS acknowledged, fire risk safety assessment completed and verbalized and blood product R/B/A discussed and consented  Peripheral Block   Block type: PENG and Lumbar paravertebral  Laterality: left  Injection technique: single-shot  Guidance: ultrasound guided    Needle   Needle gauge: 22 G  Needle localization: anatomical landmarks and ultrasound guidance  Assessment   Injection assessment: negative aspiration for heme, no paresthesia on injection, local visualized surrounding nerve on ultrasound and no intravascular symptoms  Paresthesia pain: none  Slow fractionated injection: yes  Hemodynamics: stable  Real-time US image taken/store: yes  Outcomes: uncomplicated and patient tolerated procedure well    Additional Notes  30 mL 0.5% Ropivacaine injected in 5 mL increments following negative aspiration. Tip of needle in view at all times. Pt tolerated the procedure well.

## 2023-01-12 NOTE — OP NOTE
[facility]   Procedure Note  [facility]      Patient:                 Yudy Michael  Date of Surgery:  1/12/2023      Pre-operative Diagnosis: left  Intertrochanteric Femoral Fracture    Post-operative Diagnosis: Same    Procedure: 1. IM Nailing left  Femoral Fracture    2. Intraoperative Flouroscopy left Hip and Femur with  Intraoperative interpretation     Surgeon: Bisi Mcdonald MD M.D. Assistant: Mercy SA    Anesthesia:  General    EBL: 200 ml    Tourniquet Time:  Not used    Specimens: None    Drains:  None    Complications:  None    Surgical Findings:  left intertrochanteric femur fracture     Implant:    1) Haines and nephew  11.5x180 mm, 130 degree Trigen InterTan Nail  2) 95 mm Lag Screw  3) 91 mm compression screw  4) 5.0 x 32.5 mm distal screw, static    INDICATIONS FOR PROCEDURE: The patient is admitted after having sustained the above injury. We discussed operative vs. non operative treatment options, including risks and benefits of each. After multiple questions were asked and answered, the above procedure was recommended. The risks and benefits discussed included, but were not limited to, infection, tendon or nerve injury, nonunion, malunion, malrotation, implant failure, blood clot, need for transfusion, need for hardware removal or revision, or anesthetic complication (e.g. Stroke or death). The patient and/or POA ultimately elected to undergo the above-named procedure and surgical consent was signed willingly on the hospital stack. PROCEDURE:  The patient was identified in the pre-operative holding area. The left lower extremity region was identified by the patient, the patient's family, myself, preoperative progress notes and radiographs, anesthesia and nursing staffs as being the surgical area of interest.  The surgical site was then marked by myself.  Preoperative IV antibiotics were then infused and the patient was taken to the operating room, delivered to general anesthesia, and transferred to a well padded hip fracture table. The operative extremity foot was placed in a well padded ski boot with traction performed against a well padded perineal post.  The contralateral extremity was placed in a well padded ski boot. A well padded perineal post was applied and the lower extremities were placed in a scissor position. Pre-op traction and adduction was utilized to reduce the fracture in a closed manner. The rotation of the bilateral boots was noted to be symmetric. The femoral neck angle was reviewed to the contralateral and found to be symmetric. The lower extremity was then prepped and draped in a sterile fashion. A standard longitudinal incision was then carried out over the proximal lateral aspect of the hip with sharp dissection through the skin, subcutaneous tissues, and gluteus. Blunt dissection in line with the fibers was then carried out down to the level of the greater trochanter. Under fluoroscopic guidance, a guide pin was placed through the trochanter toward the level of the lesser trochanter. Appropriate placement was confirmed with fluoroscopy on both AP and lateral views. Initial proximal reaming was performed carefully under fluoroscopic guidance and then a guide wire was introduced across the fracture with satisfactory alignment. Sequential reaming was performed to accommodate a size 11.5 nail. Measurement was performed for the appropriate length and the nail was selected. The nail was inserted over the guide wire to the appropriate level. The guide wire was then placed up the femoral neck into the femoral head for the lag screw, center position, taking into account the tip to apex distance, length measured. Cannulated drill was then used to over the guide wire to appropriate depth. The near cortex of the compression slot was then drilled and an anti-rotation bar advanced.  The lag screw was placed without difficulty and position was verified in multiple views, no joint penetrance. The compression screw was then placed and 5 mm of compression performed with fluoroscopy confirming compression at the fracture site. Proximal locking mechanism was engaged and guide wire removed. The distal targeting device was utilized to place a bicortical distal static screw. External device guide was then removed. Multiple fluoroscopic images were obtained and saved verifying fracture reduction and implant placement. The wounds were then copiously irrigated with sterile saline fluid and closed in layers with 0-vicryl for fascia, 2-0 vicryl and 4-0 Monocryl for skin. Appropriate sterile dressings were then applied via standard technique. At the time of this transcription, the plan was for the patient to be transferred to the PACU postoperatively. Patient tolerated the procedure well, no complications, no drains needed. RADIOGRAPHIC ADDENDUM: It should be noted that multiple AP and lateral radiographs of the patient's Left hip and femur region were obtained throughout the above-named procedure to ascertain appropriate fracture reduction and hardware placement. These were interpreted by myself intra-operatively.           Lisette Jordan, [address]   Orthopaedic Spine Surgery   [facility]   O: [phone]  C: [phone]

## 2023-01-12 NOTE — ED NOTES
Assumed care of pt at this time, Report received from Geoffrey Ward [place].       Abena Benitez RN  01/12/23 [id number]

## 2023-01-12 NOTE — BRIEF OP NOTE
Brief Postoperative Note      Patient: Bessy Arriaza  YOB: 1930  MRN: [id number]    Date of Procedure: 1/12/2023    Pre-Op Diagnosis: LEFT HIP FRACTURE    Post-Op Diagnosis: Same       Procedure(s):  LEFT HIP Scottie Canny NAILING    Surgeon(s):  Radha Boone MD    Assistant:  Surgical Assistant: Kathleen Gómez    Anesthesia: General    Estimated Blood Loss (mL): 906     Complications: None    Specimens:   * No specimens in log *    Implants:  * No implants in log *      Drains: * No LDAs found *    Findings: see op note    Electronically signed by Radha Boone MD on 1/12/2023 at 6:17 PM

## 2023-01-12 NOTE — PLAN OF CARE
Ortho Plan of Care:    Left intertrochanteric femur fracture  -NPO  -OK for DVT PPX  -Admit to medicine  -pre-op risk stratification and optimization   -OR today for CMN  -formal consult to follow    Monica Rico MD   Orthopaedic Spine Surgery   C:   [email or web address]

## 2023-01-12 NOTE — PROGRESS NOTES
Clinical Pharmacy Progress Note  Medication History     Admit Date: 1/12/2023    List of of current medications patient is taking is complete. Home Medication list in EPIC updated to reflect changes noted below.     Source of information: [employer] records, OARRS/PDMP report, PCP records (Dr. Abner Pavon), interview with patient/caregiver at bedside    Patient's home pharmacy: [facility]-[address] - Ph: [phone]     Changes made to medication list:   Medications removed: (include reason, ex: therapy completed, patient no longer taking, etc.)  Bupropion SR - no longer taking, last filled in 2017 per pharmacy records  Clonazepam - on alternate therapy (lorazepam)  Fluocinonide - no longer taking, last filled in 2016 per pharmacy records  Hyoscyamine - no longer taking, last filled in 2014 per pharmacy records   [address] - no longer taking, last filled in 2019 per pharmacy records  Meloxicam - no longer taking, last filled in 3/2022 per pharmacy records  Minoxidil - no longer taking  Mirabegron - on alternate therapy (Laverne De), instructed to discontinue mirabegron per PCP records  Mirtazapine - no longer taking, last filled 90 day supply on 7/20/22 per pharmacy records  Naproxen - no longer taking, last filled in 2016 per pharmacy records  Oxycodone - no longer taking, confirmed via OARRS/PDMP and pharmacy records  Phenazopyridine - no longer taking  Senna-docusate - no longer taking   Tolterodine ER - no longer taking, last filled in 2018 per pharmacy recods  Medications added:   Eszopiclone - recently started by patient's PCP for patient to trial for ~10 days for insomnia  Fesoterodine ER - per medication list sent over by patient's PCP, added at request of Dr. Sourav Byrd  Gabapentin - confirmed with patient, OARRS/PDMP and PCP records  Lorazepam - confirmed with patient, OARRS/PDMP and PCP records  Lubiprostone - per medication list sent over by patient's PCP, added at request of  Mell  Quetiapine - confirmed with patient and PCP records  Fransisco Pandeyjara - confirmed with pharmacy and PCP records  Medication doses adjusted:   Levothyroxine - dose adjusted to 112 mcg daily, last filled 30 day supply on 9/17/22 per pharmacy records, adherence to medication is questionable -> Dr. My Uriostegui aware but would like medication left on home med list  Other notes:   Medication history completed via pharmacy records, PCP records available through [facility], DOE report, as well as interview with patient/caregiver at bedside. Please note that patient is a poor historian and was not able to name all of her medications at time of interview. No medication list was brought in by patient/caregiver or available at bedside while patient was in the emergency department. Current Outpatient Medications   Medication Instructions    denosumab (PROLIA) 60 mg, SubCUTAneous, EVERY 6 MONTHS    eszopiclone (LUNESTA) 3 mg, Oral, NIGHTLY PRN, For 10 days (1/5/23 - 1/15/23)    Fesoterodine Fumarate ER 8 MG TB24 1 tablet, Oral, DAILY    gabapentin (NEURONTIN) 500 mg, Oral, EVERY EVENING    levothyroxine (SYNTHROID) 112 mcg, Oral, DAILY    LORazepam (ATIVAN) 1 mg, Oral, NIGHTLY    lubiprostone (AMITIZA) 8 mcg, Oral, 2 TIMES DAILY WITH MEALS    omeprazole (PRILOSEC) 20 mg, Oral, DAILY    QUEtiapine (SEROQUEL) 25 mg, Oral, EVERY EVENING    simvastatin (ZOCOR) 20 mg, Oral, NIGHTLY    Vibegron (GEMTESA) 75 MG TABS 1 tablet, Oral, DAILY       Please call with any questions.     Joann Roper, PharmD, [facility]  Clinical Pharmacist - Emergency Dept  Wireless: [phone]  1/12/2023 4:43 PM

## 2023-01-12 NOTE — ED PROVIDER NOTES
[address]          ATTENDING PHYSICIAN NOTE       Date of evaluation: 1/12/2023      Assessment/ Medical Decion Making     MDM:    ED Course as of 01/12/23 1536   Thu Jan 12, 2023   262 80year-old who presents for evaluation of hip pain following nonsyncopal fall. Both medical and trauma work-up initiated though patient denies any medical complaints and we have good mechanical explanation for her fall. EKG on my review with sinus rhythm, normal axis, right bundle branch block pattern, no ST or T wave changes concerning for acute ischemia. Patient also without chest pain to suggest ACS. Given parenteral narcotics for symptom control. [MM]   1011 CXR negative for PTX and shows old left humeral fracture which is known. [MM]   1013 Femur and pelvis x-ray reports only with chest x-ray read. Did call to have this corrected by radiology. On my review I am concerned for left femur fracture. [MM]   7757 Radiology interpretation reveals left intertrochanteric fracture. Orthopedic surgery consulted. Pain improved on my reassessment. [MM]   1046 Lactate is mildly elevated. She was given IV fluids. Remainder of lab work-up largely unremarkable and specifically with no anemia, renal dysfunction. Potassium elevated but hemolyzed. No EKG changes suggestive of critical hyperkalemia. [MM]   1046  hyperkalemia. [MM]      ED Course User Index  [MM] Trino Blair MD     Ortho consulted and plan for OR today. Hospitalist consulted for admission. Communicated verbally that CT of the head, chest abdomen pelvis are pending at time of admission. I did discuss CODE STATUS with patient and daughter-in-law at bedside and patient is full code at this time and wishes to proceed with surgery. Medical Decision Making  Amount and/or Complexity of Data Reviewed  Labs: ordered. Decision-making details documented in ED Course. Radiology: ordered.  Decision-making details documented in ED Course. ECG/medicine tests: ordered. Decision-making details documented in ED Course. Discussion of management or test interpretation with external provider(s): Ortho, Hospitalist    Risk  Prescription drug management. Parenteral controlled substances. Decision regarding hospitalization. Emergency major surgery. Negro Baker MD  3:35 PM    Clinical Impression     1. Closed fracture of left hip, initial encounter [facility])        Disposition     DISPOSITION Admitted 01/12/2023 11:48:13 AM        Chief Complaint     Hip Pain (Left, s/p fall)      History of Present Illness     Jose A Miguel is a 80 y.o. female with a past medical history inclusive of osteoarthritis, osteoporosis with prior fractures presenting for evaluation of fall. Patient lives at home. States she slipped on the tile floor and this is what caused her fall. She denies loss of consciousness but was unable to get up. States she was on the ground for approximately an hour screaming for help. States her  eventually found her and is the one who called EMS. Reports fell onto her left hip and this is what is bothering her. She denies any pain in her chest or abdomen. Does note that she has some mild pain in her shoulder from prior fall with fracture but she has this normally. She denies hitting her head or losing consciousness. Unable to move her left leg because of the pain. Denies anticoagulant use. States she was feeling completely fine prior to the fall. Has been working with a physical therapist due to poor balance at baseline. Review of Systems     Pertinent positive and negative findings as documented in the HPI. Otherwise a complete ROS was performed and all other systems were reviewed and were negative. Physical Exam     INITIAL VITALS: BP: (!) 156/64, Temp: 97.7 °F (36.5 °C), Heart Rate: 69, Resp: 22, SpO2: 100 %     Nursing note and vitals reviewed.     Physical Exam  Constitutional: General: She is in acute distress (calling out in pain). Appearance: Normal appearance. She is not toxic-appearing. HENT:      Head: Normocephalic. Right Ear: External ear normal.      Left Ear: External ear normal.   Eyes:      General:         Right eye: No discharge. Left eye: No discharge. Extraocular Movements: Extraocular movements intact. Cardiovascular:      Rate and Rhythm: Normal rate. Comments: 2+ DP and PT pulses bilateral feet  Pulmonary:      Effort: Pulmonary effort is normal. No respiratory distress. Abdominal:      General: There is no distension. Tenderness: There is no abdominal tenderness. Musculoskeletal:         General: No deformity. Cervical back: Normal range of motion. No rigidity. Right lower leg: No edema. Left lower leg: No edema. Comments: Lle externally rotated, TTP to left hip and proximal femur. No tenderness to palpation distally. No tenderness to palpation to any other   Skin:     General: Skin is warm and dry. Neurological:      Mental Status: She is alert. Mental status is at baseline. Psychiatric:         Thought Content: Thought content normal.       Procedures   Procedures    Past Medical, Surgical, Family, and Social History     She has a past medical history of Cancer ([address] Hepatitis, Hyperlipidemia, and Osteoarthritis. She has a past surgical history that includes Mastectomy; Breast lumpectomy; and  section. Her family history includes Arthritis in an other family member; Cancer in her mother. She reports that she has quit smoking. She has never used smokeless tobacco. She reports current alcohol use of about 1.0 standard drink per week. She reports that she does not use drugs. Nursing Notes, Past Medical Hx, Past Surgical Hx, Social Hx,Allergies, and Family Hx were reviewed.     Medications     Previous Medications    DENOSUMAB (PROLIA) 60 MG/ML SOLN SC INJECTION    Inject 60 mg into the skin GABAPENTIN (NEURONTIN) 100 MG CAPSULE    Take 500 mg by mouth every evening. LINZESS 145 MCG CAPSULE    TK 1 C PO QAM    LORAZEPAM (ATIVAN) 1 MG TABLET    Take 1 mg by mouth nightly. MINOXIDIL (ROGAINE) 2 % EXTERNAL SOLUTION    Apply twice daily    MIRABEGRON ER 25 MG TB24    Take 50 mg by mouth daily    QUETIAPINE (SEROQUEL) 25 MG TABLET    Take 25 mg by mouth every evening    SENNOSIDES-DOCUSATE SODIUM (SENOKOT-S) 8.6-50 MG TABLET    Take by mouth    SIMVASTATIN (ZOCOR) 20 MG TABLET    TAKE 1 TABLET BY MOUTH EVERY EVENING       Allergies     She has No Known Allergies.     ED Course     Patient was given the following medications:  Orders Placed This Encounter   Medications    fentaNYL (SUBLIMAZE) 100 MCG/2ML injection     Soniya Wu: cabinet override    fentaNYL (SUBLIMAZE) injection 25 mcg    fentaNYL (SUBLIMAZE) injection 25 mcg    morphine (PF) injection 2 mg    lactated ringers bolus    morphine (PF) injection 2 mg    iopamidol (ISOVUE-370) 76 % injection 75 mL    HYDROmorphone (DILAUDID) injection 0.25 mg    lactated ringers bolus    perflutren lipid microspheres (DEFINITY) injection 1.5 mL    sodium chloride flush 0.9 % injection 5-40 mL    sodium chloride flush 0.9 % injection 5-40 mL    0.9 % sodium chloride infusion    OR Linked Order Group     ondansetron (ZOFRAN-ODT) disintegrating tablet 4 mg     ondansetron (ZOFRAN) injection 4 mg    polyethylene glycol (GLYCOLAX) packet 17 g    OR Linked Order Group     acetaminophen (TYLENOL) tablet 650 mg     acetaminophen (TYLENOL) suppository 650 mg    DISCONTD: famotidine (PEPCID) tablet 20 mg    heparin (porcine) injection 5,000 Units    levothyroxine (SYNTHROID) tablet 88 mcg    atorvastatin (LIPITOR) tablet 10 mg    pantoprazole (PROTONIX) tablet 40 mg    DISCONTD: oxyCODONE (ROXICODONE) immediate release tablet 10 mg    clonazePAM (KLONOPIN) tablet 1 mg    mirtazapine (REMERON) tablet 45 mg    DISCONTD: oxyCODONE (ROXICODONE) immediate release tablet 5 mg    DISCONTD: HYDROmorphone (DILAUDID) injection 0.25 mg    lactated ringers infusion    OR Linked Order Group     oxyCODONE (ROXICODONE) immediate release tablet 5 mg     oxyCODONE (ROXICODONE) immediate release tablet 10 mg    simethicone (MYLICON) chewable tablet 80 mg    HYDROmorphone (DILAUDID) injection 0.5 mg       No results found. RECENT VITALS:  BP: (!) 135/121,Temp: 97.7 °F (36.5 °C), Heart Rate: 87, Resp: 16, SpO2: (!) 83 %     RADIOLOGY:  CT CHEST ABDOMEN PELVIS W CONTRAST Additional Contrast? None   Final Result      CHEST:   1.  Small right pleural effusion and trace left pleural effusion. Mild bibasilar atelectasis. 2.  Stable remote compression deformities at T12 and L1 compared to prior MRI from 2015. There is also new compression deformity at T11 compared to the study, but this is age indeterminant and may also be remote. Recommend correlation with point    tenderness in this level. 3.  Motion limited exam without definite evidence of acute traumatic intrathoracic abnormality. 4.  Moderate hiatal hernia. ABDOMEN AND PELVIS:   1.  Mildly displaced comminuted intratrochanteric fracture of the left femur. 2.  Surrounding stranding/hematoma in the left thigh as detailed above. 3.  No additional acute traumatic abnormality identified in the abdomen and pelvis per         CT CERVICAL SPINE WO CONTRAST   Final Result   1. No acute traumatic abnormality of the cervical spine. 2. Stable multilevel degenerative disc disease and facet arthropathy. 3. Stable slight rotatory subluxation of C1 on C2 on the right side. CT HEAD WO CONTRAST   Final Result      1. Stable exam with no acute intracranial abnormality. XR FEMUR LEFT (MIN 2 VIEWS)   Final Result   Addendum (preliminary) 1 of 1   ADDENDUM #1    Addendum: Initial report by Dr. Jillian Mcintyre. Addendum by Dr. Gigi Villavicencio.       Frontal view of the pelvis and frontal and frog-leg lateral views of the    left femur were obtained. There is a nondisplaced intertrochanteric    fracture of the left femur. No dislocation. Orthopedic hardware of the    proximal right femur is noted. No    additional fracture deformity. Final   Nondisplaced intertrochanteric fracture on the left. IMPRESSION:      No pneumothorax. Diffusely prominent interstitial markings presumably representing chronic interstitial lung disease. No prior for comparison. Normal cardiomediastinal silhouette. Presumed old fracture deformity of the proximal left humerus. If there is left shoulder pain then dedicated radiographs are indicated. XR PELVIS (1-2 VIEWS)   Final Result   Addendum (preliminary) 1 of 1   ADDENDUM #1    Addendum: Initial report by Dr. Kathy Howe. Addendum by Dr. Alma Aponte. Frontal view of the pelvis and frontal and frog-leg lateral views of the    left femur were obtained. There is a nondisplaced intertrochanteric    fracture of the left femur. No dislocation. Orthopedic hardware of the    proximal right femur is noted. No    additional fracture deformity. Final   Nondisplaced intertrochanteric fracture on the left. IMPRESSION:      No pneumothorax. Diffusely prominent interstitial markings presumably representing chronic interstitial lung disease. No prior for comparison. Normal cardiomediastinal silhouette. Presumed old fracture deformity of the proximal left humerus. If there is left shoulder pain then dedicated radiographs are indicated. XR CHEST PORTABLE   Final Result   Addendum (preliminary) 1 of 1    ADDENDUM #1    Addendum: Initial report by Dr. Kathy Howe. Addendum by Dr. Alma Aponte. Frontal view of the pelvis and frontal and frog-leg lateral views of the    left femur were obtained. There is a nondisplaced intertrochanteric    fracture of the left femur. No dislocation. Orthopedic hardware of the    proximal right femur is noted. No    additional fracture deformity.          Final Nondisplaced intertrochanteric fracture on the left. IMPRESSION:      No pneumothorax. Diffusely prominent interstitial markings presumably representing chronic interstitial lung disease. No prior for comparison. Normal cardiomediastinal silhouette. Presumed old fracture deformity of the proximal left humerus. If there is left shoulder pain then dedicated radiographs are indicated.           LABS:   Results for orders placed or performed during the hospital encounter of 01/12/23   CBC with Auto Differential   Result Value Ref Range    WBC 6.5 4.0 - 11.0 K/uL    RBC 4.85 4.00 - 5.20 M/uL    Hemoglobin 15.0 12.0 - 16.0 g/dL    Hematocrit 44.3 36.0 - 48.0 %    MCV 91.3 80.0 - 100.0 fL    MCH 30.9 26.0 - 34.0 pg    MCHC 33.8 31.0 - 36.0 g/dL    RDW 14.4 12.4 - 15.4 %    Platelets 861 304 - 745 K/uL    MPV 8.0 5.0 - 10.5 fL    Neutrophils % 81.1 %    Lymphocytes % 9.8 %    Monocytes % 8.4 %    Eosinophils % 0.0 %    Basophils % 0.7 %    Neutrophils Absolute 5.2 1.7 - 7.7 K/uL    Lymphocytes Absolute 0.6 (L) 1.0 - 5.1 K/uL    Monocytes Absolute 0.5 0.0 - 1.3 K/uL    Eosinophils Absolute 0.0 0.0 - 0.6 K/uL    Basophils Absolute 0.0 0.0 - 0.2 K/uL   Basic Metabolic Panel w/ Reflex to MG   Result Value Ref Range    Sodium 135 (L) 136 - 145 mmol/L    Potassium reflex Magnesium 5.2 (H) 3.5 - 5.1 mmol/L    Chloride 100 99 - 110 mmol/L    CO2 21 21 - 32 mmol/L    Anion Gap 14 3 - 16    Glucose 106 (H) 70 - 99 mg/dL    BUN 13 7 - 20 mg/dL    Creatinine 0.8 0.6 - 1.2 mg/dL    Est, Glom Filt Rate >60 >60    Calcium 9.9 8.3 - 10.6 mg/dL   Lactic Acid   Result Value Ref Range    Lactic Acid 3.0 (H) 0.4 - 2.0 mmol/L   Protime-INR   Result Value Ref Range    Protime 14.2 11.7 - 14.5 sec    INR 1.10 0.87 - 1.14   APTT   Result Value Ref Range    aPTT 38.2 (H) 23.0 - 34.3 sec   Comprehensive Metabolic Panel   Result Value Ref Range    Potassium 4.1 3.5 - 5.1 mmol/L    Sodium 138 136 - 145 mmol/L    Chloride 102 99 - 110 mmol/L    CO2 22 21 - 32 mmol/L    Anion Gap 14 3 - 16    Glucose 105 (H) 70 - 99 mg/dL    BUN 10 7 - 20 mg/dL    Creatinine 0.8 0.6 - 1.2 mg/dL    Est, Glom Filt Rate >60 >60    Calcium 9.7 8.3 - 10.6 mg/dL    Total Protein 6.6 6.4 - 8.2 g/dL    Albumin 4.2 3.4 - 5.0 g/dL    Albumin/Globulin Ratio 1.8 1.1 - 2.2    Total Bilirubin 1.0 0.0 - 1.0 mg/dL    Alkaline Phosphatase 55 40 - 129 U/L    ALT 11 10 - 40 U/L    AST 19 15 - 37 U/L   Magnesium   Result Value Ref Range    Magnesium 1.70 (L) 1.80 - 2.40 mg/dL   Phosphorus   Result Value Ref Range    Phosphorus 4.0 2.5 - 4.9 mg/dL   EKG 12 Lead   Result Value Ref Range    Ventricular Rate 73 BPM    Atrial Rate 73 BPM    P-R Interval 136 ms    QRS Duration 126 ms    Q-T Interval 442 ms    QTc Calculation (Bazett) 486 ms    P Axis 62 degrees    R Axis 83 degrees    T Axis 64 degrees    Diagnosis       EKG performed in ER and to be interpreted by ER physician. Confirmed by RASHEL BAEZ ([id number]),  Shweta Trejo ([id number]) on 1/12/2023 10:04:28 AM   EKG 12 Lead   Result Value Ref Range    Ventricular Rate 89 BPM    Atrial Rate 89 BPM    P-R Interval 148 ms    QRS Duration 120 ms    Q-T Interval 400 ms    QTc Calculation (Bazett) 486 ms    P Axis 58 degrees    R Axis 100 degrees    T Axis 46 degrees    Diagnosis       EKG performed in ER and to be interpreted by ER physician. Confirmed by RASHEL BAEZ ([id number]),  Shweta Trejo ([id number]) on 1/12/2023 1:15:29 PM       CONSULTS:  Sheryl[address] [place] TO HOSPITALIST  IP CONSULT TO CARDIOLOGY    PATIENT REFERRED TO:  No follow-up provider specified.     DISCHARGE MEDICATIONS:  New Prescriptions    No medications on file          Rudy Rodney MD  01/12/23 4884

## 2023-01-12 NOTE — ED NOTES
MD Lloyd Franklin at bedside, gave verbal to do bladder scan, bladder scan showed >500mL in the bladder.       Ronen Chowdhury RN  01/12/23 3665

## 2023-01-13 ENCOUNTER — APPOINTMENT (OUTPATIENT)
Dept: GENERAL RADIOLOGY | Age: 88
DRG: 480 | End: 2023-01-13
Payer: MEDICARE

## 2023-01-13 ENCOUNTER — APPOINTMENT (OUTPATIENT)
Dept: CT IMAGING | Age: 88
DRG: 480 | End: 2023-01-13
Payer: MEDICARE

## 2023-01-13 LAB
A/G RATIO: 1.6 (ref 1.1–2.2)
ALBUMIN SERPL-MCNC: 3.1 G/DL (ref 3.4–5)
ALP BLD-CCNC: 37 U/L (ref 40–129)
ALT SERPL-CCNC: 8 U/L (ref 10–40)
AMORPHOUS: ABNORMAL /HPF
ANION GAP SERPL CALCULATED.3IONS-SCNC: 10 MMOL/L (ref 3–16)
AST SERPL-CCNC: 16 U/L (ref 15–37)
BILIRUB SERPL-MCNC: 0.5 MG/DL (ref 0–1)
BILIRUBIN URINE: NEGATIVE
BLOOD, URINE: ABNORMAL
BUN BLDV-MCNC: 12 MG/DL (ref 7–20)
CALCIUM SERPL-MCNC: 8.8 MG/DL (ref 8.3–10.6)
CHLORIDE BLD-SCNC: 105 MMOL/L (ref 99–110)
CLARITY: CLEAR
CO2: 22 MMOL/L (ref 21–32)
COLOR: YELLOW
CREAT SERPL-MCNC: 0.8 MG/DL (ref 0.6–1.2)
EPITHELIAL CELLS, UA: ABNORMAL /HPF (ref 0–5)
GFR SERPL CREATININE-BSD FRML MDRD: >60 ML/MIN/{1.73_M2}
GLUCOSE BLD-MCNC: 146 MG/DL (ref 70–99)
GLUCOSE URINE: NEGATIVE MG/DL
HYALINE CASTS: ABNORMAL /LPF (ref 0–2)
KETONES, URINE: 15 MG/DL
LEUKOCYTE ESTERASE, URINE: NEGATIVE
MAGNESIUM: 2.1 MG/DL (ref 1.8–2.4)
MICROSCOPIC EXAMINATION: YES
NITRITE, URINE: NEGATIVE
PH UA: 6 (ref 5–8)
PHOSPHORUS: 5.1 MG/DL (ref 2.5–4.9)
POTASSIUM SERPL-SCNC: 4.3 MMOL/L (ref 3.5–5.1)
PROCALCITONIN: 0.19 NG/ML (ref 0–0.15)
PROTEIN UA: NEGATIVE MG/DL
RBC UA: ABNORMAL /HPF (ref 0–4)
SODIUM BLD-SCNC: 137 MMOL/L (ref 136–145)
SPECIFIC GRAVITY UA: 1.02 (ref 1–1.03)
T4 FREE: 1.1 NG/DL (ref 0.9–1.8)
TOTAL PROTEIN: 5.1 G/DL (ref 6.4–8.2)
TSH SERPL DL<=0.05 MIU/L-ACNC: 1.15 UIU/ML (ref 0.27–4.2)
URINE REFLEX TO CULTURE: ABNORMAL
URINE TYPE: ABNORMAL
UROBILINOGEN, URINE: 0.2 E.U./DL
WBC UA: ABNORMAL /HPF (ref 0–5)

## 2023-01-13 PROCEDURE — 82306 VITAMIN D 25 HYDROXY: CPT

## 2023-01-13 PROCEDURE — 94761 N-INVAS EAR/PLS OXIMETRY MLT: CPT

## 2023-01-13 PROCEDURE — 97530 THERAPEUTIC ACTIVITIES: CPT

## 2023-01-13 PROCEDURE — 36415 COLL VENOUS BLD VENIPUNCTURE: CPT

## 2023-01-13 PROCEDURE — 73502 X-RAY EXAM HIP UNI 2-3 VIEWS: CPT

## 2023-01-13 PROCEDURE — 85025 COMPLETE CBC W/AUTO DIFF WBC: CPT

## 2023-01-13 PROCEDURE — 73560 X-RAY EXAM OF KNEE 1 OR 2: CPT

## 2023-01-13 PROCEDURE — 97166 OT EVAL MOD COMPLEX 45 MIN: CPT

## 2023-01-13 PROCEDURE — 81001 URINALYSIS AUTO W/SCOPE: CPT

## 2023-01-13 PROCEDURE — 2580000003 HC RX 258: Performed by: STUDENT IN AN ORGANIZED HEALTH CARE EDUCATION/TRAINING PROGRAM

## 2023-01-13 PROCEDURE — 2580000003 HC RX 258: Performed by: INTERNAL MEDICINE

## 2023-01-13 PROCEDURE — 97535 SELF CARE MNGMENT TRAINING: CPT

## 2023-01-13 PROCEDURE — 84439 ASSAY OF FREE THYROXINE: CPT

## 2023-01-13 PROCEDURE — 83735 ASSAY OF MAGNESIUM: CPT

## 2023-01-13 PROCEDURE — 84443 ASSAY THYROID STIM HORMONE: CPT

## 2023-01-13 PROCEDURE — 86900 BLOOD TYPING SEROLOGIC ABO: CPT

## 2023-01-13 PROCEDURE — 73030 X-RAY EXAM OF SHOULDER: CPT

## 2023-01-13 PROCEDURE — 1200000000 HC SEMI PRIVATE

## 2023-01-13 PROCEDURE — 97162 PT EVAL MOD COMPLEX 30 MIN: CPT

## 2023-01-13 PROCEDURE — 84100 ASSAY OF PHOSPHORUS: CPT

## 2023-01-13 PROCEDURE — 2700000000 HC OXYGEN THERAPY PER DAY

## 2023-01-13 PROCEDURE — 6360000002 HC RX W HCPCS: Performed by: STUDENT IN AN ORGANIZED HEALTH CARE EDUCATION/TRAINING PROGRAM

## 2023-01-13 PROCEDURE — 86901 BLOOD TYPING SEROLOGIC RH(D): CPT

## 2023-01-13 PROCEDURE — 86850 RBC ANTIBODY SCREEN: CPT

## 2023-01-13 PROCEDURE — 73600 X-RAY EXAM OF ANKLE: CPT

## 2023-01-13 PROCEDURE — 80053 COMPREHEN METABOLIC PANEL: CPT

## 2023-01-13 PROCEDURE — 84145 PROCALCITONIN (PCT): CPT

## 2023-01-13 PROCEDURE — 73590 X-RAY EXAM OF LOWER LEG: CPT

## 2023-01-13 PROCEDURE — 82746 ASSAY OF FOLIC ACID SERUM: CPT

## 2023-01-13 PROCEDURE — 6370000000 HC RX 637 (ALT 250 FOR IP): Performed by: STUDENT IN AN ORGANIZED HEALTH CARE EDUCATION/TRAINING PROGRAM

## 2023-01-13 PROCEDURE — 82607 VITAMIN B-12: CPT

## 2023-01-13 PROCEDURE — 70450 CT HEAD/BRAIN W/O DYE: CPT

## 2023-01-13 RX ORDER — SODIUM CHLORIDE, SODIUM LACTATE, POTASSIUM CHLORIDE, AND CALCIUM CHLORIDE .6; .31; .03; .02 G/100ML; G/100ML; G/100ML; G/100ML
1000 INJECTION, SOLUTION INTRAVENOUS ONCE
Status: COMPLETED | OUTPATIENT
Start: 2023-01-13 | End: 2023-01-13

## 2023-01-13 RX ORDER — SODIUM CHLORIDE 9 MG/ML
INJECTION, SOLUTION INTRAVENOUS CONTINUOUS
Status: ACTIVE | OUTPATIENT
Start: 2023-01-13 | End: 2023-01-13

## 2023-01-13 RX ADMIN — PANTOPRAZOLE SODIUM 40 MG: 40 TABLET, DELAYED RELEASE ORAL at 06:24

## 2023-01-13 RX ADMIN — CEFAZOLIN 2000 MG: 2 INJECTION, POWDER, FOR SOLUTION INTRAMUSCULAR; INTRAVENOUS at 01:04

## 2023-01-13 RX ADMIN — CEFAZOLIN 2000 MG: 2 INJECTION, POWDER, FOR SOLUTION INTRAMUSCULAR; INTRAVENOUS at 09:33

## 2023-01-13 RX ADMIN — OXYCODONE HYDROCHLORIDE 10 MG: 5 TABLET ORAL at 11:00

## 2023-01-13 RX ADMIN — SODIUM CHLORIDE, PRESERVATIVE FREE 10 ML: 5 INJECTION INTRAVENOUS at 22:00

## 2023-01-13 RX ADMIN — HYDROMORPHONE HYDROCHLORIDE 0.5 MG: 1 INJECTION, SOLUTION INTRAMUSCULAR; INTRAVENOUS; SUBCUTANEOUS at 14:52

## 2023-01-13 RX ADMIN — HEPARIN SODIUM 5000 UNITS: 5000 INJECTION INTRAVENOUS; SUBCUTANEOUS at 21:59

## 2023-01-13 RX ADMIN — SODIUM CHLORIDE: 9 INJECTION, SOLUTION INTRAVENOUS at 02:53

## 2023-01-13 RX ADMIN — SODIUM CHLORIDE: 9 INJECTION, SOLUTION INTRAVENOUS at 09:32

## 2023-01-13 RX ADMIN — LEVOTHYROXINE SODIUM 112 MCG: 0.11 TABLET ORAL at 06:24

## 2023-01-13 RX ADMIN — HYDROMORPHONE HYDROCHLORIDE 0.5 MG: 1 INJECTION, SOLUTION INTRAMUSCULAR; INTRAVENOUS; SUBCUTANEOUS at 19:46

## 2023-01-13 RX ADMIN — SODIUM CHLORIDE, POTASSIUM CHLORIDE, SODIUM LACTATE AND CALCIUM CHLORIDE: 600; 310; 30; 20 INJECTION, SOLUTION INTRAVENOUS at 23:13

## 2023-01-13 RX ADMIN — CLONAZEPAM 1 MG: 1 TABLET ORAL at 21:59

## 2023-01-13 RX ADMIN — SODIUM CHLORIDE, POTASSIUM CHLORIDE, SODIUM LACTATE AND CALCIUM CHLORIDE 1000 ML: 600; 310; 30; 20 INJECTION, SOLUTION INTRAVENOUS at 13:10

## 2023-01-13 RX ADMIN — ATORVASTATIN CALCIUM 10 MG: 10 TABLET, FILM COATED ORAL at 21:59

## 2023-01-13 RX ADMIN — HEPARIN SODIUM 5000 UNITS: 5000 INJECTION INTRAVENOUS; SUBCUTANEOUS at 14:48

## 2023-01-13 RX ADMIN — HEPARIN SODIUM 5000 UNITS: 5000 INJECTION INTRAVENOUS; SUBCUTANEOUS at 06:24

## 2023-01-13 ASSESSMENT — PAIN DESCRIPTION - DESCRIPTORS
DESCRIPTORS: DISCOMFORT
DESCRIPTORS: ACHING;DISCOMFORT
DESCRIPTORS: ACHING;SHOOTING
DESCRIPTORS: DISCOMFORT
DESCRIPTORS: ACHING;DISCOMFORT

## 2023-01-13 ASSESSMENT — PAIN DESCRIPTION - LOCATION
LOCATION: HIP
LOCATION: HIP;LEG

## 2023-01-13 ASSESSMENT — PAIN DESCRIPTION - ORIENTATION
ORIENTATION: LEFT

## 2023-01-13 ASSESSMENT — PAIN - FUNCTIONAL ASSESSMENT
PAIN_FUNCTIONAL_ASSESSMENT: ACTIVITIES ARE NOT PREVENTED
PAIN_FUNCTIONAL_ASSESSMENT: PREVENTS OR INTERFERES SOME ACTIVE ACTIVITIES AND ADLS
PAIN_FUNCTIONAL_ASSESSMENT: ACTIVITIES ARE NOT PREVENTED
PAIN_FUNCTIONAL_ASSESSMENT: PREVENTS OR INTERFERES SOME ACTIVE ACTIVITIES AND ADLS
PAIN_FUNCTIONAL_ASSESSMENT: PREVENTS OR INTERFERES SOME ACTIVE ACTIVITIES AND ADLS

## 2023-01-13 ASSESSMENT — PAIN DESCRIPTION - FREQUENCY
FREQUENCY: CONTINUOUS

## 2023-01-13 ASSESSMENT — PAIN DESCRIPTION - ONSET
ONSET: GRADUAL
ONSET: GRADUAL
ONSET: ON-GOING

## 2023-01-13 ASSESSMENT — PAIN SCALES - GENERAL
PAINLEVEL_OUTOF10: 0
PAINLEVEL_OUTOF10: 10
PAINLEVEL_OUTOF10: 5
PAINLEVEL_OUTOF10: 10
PAINLEVEL_OUTOF10: 10

## 2023-01-13 ASSESSMENT — PAIN DESCRIPTION - DIRECTION
RADIATING_TOWARDS: THIGH

## 2023-01-13 ASSESSMENT — PAIN DESCRIPTION - PAIN TYPE
TYPE: ACUTE PAIN;SURGICAL PAIN;CHRONIC PAIN
TYPE: ACUTE PAIN;CHRONIC PAIN
TYPE: ACUTE PAIN;CHRONIC PAIN
TYPE: ACUTE PAIN
TYPE: ACUTE PAIN

## 2023-01-13 NOTE — CONSULTS
Urology Attending Consult Note      Reason for Consultation: Retention    History: 89 yo F admitted after a fall, found to have L femur fracture. Did not report any prior history of urinary issues but PVR >500cc and nursing staff could not place marrero. Now sp IM nailing of L femoral fracture. Marrero draining clear.     Family History, Social History, Review of Systems:  Reviewed and agreed to as per chart    Vitals:  BP (!) 92/55   Pulse 100   Temp 98.1 °F (36.7 °C) (Oral)   Resp 18   Ht 5' 1\" (1.549 m)   Wt 138 lb 10.7 oz (62.9 kg)   SpO2 98%   BMI 26.20 kg/m²   Temp  Av.5 °F (36.4 °C)  Min: 97 °F (36.1 °C)  Max: 98.1 °F (36.7 °C)    Intake/Output Summary (Last 24 hours) at 2023 0841  Last data filed at 2023 0630  Gross per 24 hour   Intake 3350 ml   Output 1800 ml   Net 1550 ml         Physical:  Well developed, well nourished in no acute distress  Mood indicates no abnormalities. Pt doesn’t appear depressed  Orientated to time and place  Neck is supple, trachea is midline  Respiratory effort is normal  Cardiovascular show no extremity swelling  Abdomen no masses or hernias are palpated, there is no tenderness. Liver and Spleen appear normal.  Skin show no abnormal lesions  Lymph nodes are not palpated in the inguinal, neck, or axillary area.    Female :   Urine clear      Labs:  WBC:    Lab Results   Component Value Date/Time    WBC 6.5 2023 09:12 AM     Hemoglobin/Hematocrit:    Lab Results   Component Value Date/Time    HGB 15.0 2023 09:12 AM    HCT 44.3 2023 09:12 AM     BMP:    Lab Results   Component Value Date/Time     2023 06:16 AM    K 4.3 2023 06:16 AM    K 5.2 2023 09:12 AM     2023 06:16 AM    CO2 22 2023 06:16 AM    BUN 12 2023 06:16 AM    LABALBU 3.1 2023 06:16 AM    CREATININE 0.8 2023 06:16 AM    CALCIUM 8.8 2023 06:16 AM    LABGLOM >60 2023 06:16 AM     PT/INR:    Lab Results  Component Value Date/Time    PROTIME 14.2 01/12/2023 09:12 AM    INR 1.10 01/12/2023 09:12 AM     PTT:    Lab Results   Component Value Date/Time    APTT 38.2 01/12/2023 09:12 AM   [APTT    Impression/Plan: 79 yo F POD#1 sp IM nailing L femur fracture. We were consulted for retention and inability to place marrero. -No  complaints, marrero clear  -Recommend keeping marrero indefinitely at this time, until she is able to ambulate and recovers. Please do not remove catheter until talking to urology first.  -For now, will sign off.  Call with any questions    MATHEUS Pinedo

## 2023-01-13 NOTE — CARE COORDINATION
Case Management Assessment  Initial Evaluation    Date/Time of Evaluation: 1/13/2023 1:38 PM  Assessment Completed by: Eric Sandhu RN    If patient is discharged prior to next notation, then this note serves as note for discharge by case management. Patient Name: Aida Durán                   YOB: 1930  Diagnosis: Other fracture of left femur, initial encounter for closed fracture [facility]) [S72.8X2A]  Closed fracture of left hip, initial encounter ([facility] [address] Usama Jefferson                   Date / Time: 1/12/2023  8:42 AM    Patient Admission Status: Inpatient   Readmission Risk (Low < 19, Mod (19-27), High > 27): Readmission Risk Score: 13.8    Current PCP: Ellen Craig  PCP verified by CM? No    Chart Reviewed: Yes      History Provided by: Patient  Patient Orientation: Alert and Oriented    Patient Cognition: Alert    Hospitalization in the last 30 days (Readmission):  No    If yes, Readmission Assessment in CM Navigator will be completed. Advance Directives:      Code Status: Full Code   Patient's Primary Decision Maker is: Legal Next of Kin      Discharge Planning:    Patient lives with: Alone Type of Home: Acute Rehab  Primary Care Giver: Self  Patient Support Systems include: None   Current Financial resources: Medicare  Current community resources: None  Current services prior to admission: None            Current DME:              Type of Home Care services:  None    ADLS  Prior functional level: Independent in ADLs/IADLs  Current functional level: Independent in ADLs/IADLs    PT AM-PAC: 11 /24  OT AM-PAC: 13 /24    Family can provide assistance at DC: No  Would you like Case Management to discuss the discharge plan with any other family members/significant others, and if so, who?  Yes  Plans to Return to Present Housing: No  Other Identified Issues/Barriers to RETURNING to current housing: safety  Potential Assistance needed at discharge: [address] DME:    Patient expects to discharge to: Acute rehab  Plan for transportation at discharge: Family    Financial    Payor: MEDICARE / Plan: MEDICARE PART A AND B / Product Type: *No Product type* /     Does insurance require precert for SNF: No    Potential assistance Purchasing Medications: No  Meds-to-Beds request[facility]        [facility] [address] [facility], [address] [place] [address] Ninoska Cancino [phone] [address] [phone]  [address] [address]  Phone: [phone] Fax: [phone]      Notes:    Factors facilitating achievement of predicted outcomes: Family support, Motivated, Cooperative, and Pleasant    Barriers to discharge: Medical clearance     Additional Case Management Notes:       CM  following for  d/c planning  . Pt lives  Condo alone  , and presented  after  a fall , she  has  no current  services  and was Indep  and an  active    up till now . She  was  seen by Ortho and  Urology :      Pt  S/P  pinning ,  PTOT  rec  ARU :  CM  placed  PS  message to MD  to  request PM&R consult. Per Colette Garibay in  ARU they will have  a bed  available this weekend  if pt  is  medically ready . ( No pre cert  needed )     CM spoke with pt  and she  was  unsure  of what she  wanted to do and  was still some  pain. CM  reached out to Kandi  pt son to follow up, and  update re: disposition:  No answer  LVM requesting  call back . The Plan for Transition of Care is related to the following treatment goals of Other fracture of left femur, initial encounter for closed fracture ([facility] [address] [S72.8X2A]  Closed fracture of left hip, initial encounter ([facility] [address] [K46.585V]    IF APPLICABLE: The Patient and/or patient representative Katharine Goff and her family were provided with a choice of provider and agrees with the discharge plan.  Freedom of choice list with basic dialogue that supports the patient's individualized plan of care/goals and shares the quality data associated with the providers was provided to: Patient   Patient Representative Name:       The Patient and/or Patient Representative Agree with the Discharge Plan?  Yes    Reford LIVAN Neumann  Case Management Department  Ph: [phone]

## 2023-01-13 NOTE — PROGRESS NOTES
Patient attempted to get out of bed without assistance. Bed alarm on, locked, and low. Camera in place. Patient on floor, Patient states that she did not hit head. Nurses and MD at bedside. Will obtain CT of head and hip.

## 2023-01-13 NOTE — PLAN OF CARE
Problem: Musculoskeletal - Adult  Goal: Return mobility to safest level of function  Outcome: Not Progressing  Goal: Maintain proper alignment of affected body part  Outcome: Not Progressing  Goal: Return ADL status to a safe level of function  Outcome: Not Progressing   Patient complains of pain in the left hip. Not able to bear weight on that side   Problem: Safety - Adult  Goal: Free from fall injury  Outcome: Not Progressing   Patient fell during shift. Bed alarm locked and low prior and post fall . Problem: Pain  Goal: Verbalizes/displays adequate comfort level or baseline comfort level  Outcome: Not Progressing   Patient complains of 10/10 pain prn medications given.

## 2023-01-13 NOTE — PROGRESS NOTES
Occupational Therapy  Facility/Department: [address]  Occupational Therapy Initial Assessment and Treatment Note     Name: Mikaela Fontenot  : 1930  MRN: [id number]  Date of Service: 2023    Discharge Recommendations:Jen Costa scored a  on the AM-PAC ADL Inpatient form. Current research shows that an AM-PAC score of 17 or less is typically not associated with a discharge to the patient's home setting. Based on the patient's AM-PAC score and their current ADL deficits, it is recommended that the patient have 5-7 sessions per week of Occupational Therapy at d/c to increase the patient's independence. At this time, this patient demonstrates complex nursing, medical, and rehabilitative needs, and would benefit from intensive rehabilitation services upon discharge from the Inpatient setting. This patient demonstrates the ability to participate in and benefit from an intensive therapy program with a coordinated interdisciplinary team approach to foster frequent, structured, and documented communication among disciplines, who will work together to establish, prioritize, and achieve treatment goals. Please see assessment section for further patient specific details. If patient discharges prior to next session this note will serve as a discharge summary. Please see below for the latest assessment towards goals. Patient Diagnosis(es): The encounter diagnosis was Closed fracture of left hip, initial encounter ([facility] [address] Past Medical History:  has a past medical history of Cancer ([address] Hepatitis, Hyperlipidemia, and Osteoarthritis. Past Surgical History:  has a past surgical history that includes Mastectomy; Breast lumpectomy;  section; and hip surgery (Left, 2023). Treatment Diagnosis: impaired ADLs/ functional transfers and mobility 2/2 L femur fx      Assessment   Performance deficits / Impairments: Decreased functional mobility ; Decreased ADL status  Assessment: Pt from home alone. Pt is active, independent with mobility / ADLs  and driving prior to fall / L femur fx. Pt demo increased anxiety with OOB attempts. Does well with encouragement. Anticipate good progress toward goals. Pt would benefit from intense Inpt OT at d/c to maximize functional level/ independence. Will follow as inpt  Treatment Diagnosis: impaired ADLs/ functional transfers and mobility 2/2 L femur fx  Prognosis: Good;Fair  Decision Making: Medium Complexity  REQUIRES OT FOLLOW-UP: Yes  Activity Tolerance  Activity Tolerance: Patient Tolerated treatment well  Activity Tolerance Comments: Pt anxious during treatment. does well with encouragement. Pt o2 sat on RA 97-99% during treatment        Plan   Occupational Therapy Plan  Times Per Week: 5-7x  Times Per Day: Once a day  Current Treatment Recommendations: Safety education & training, Self-Care / ADL, Patient/Caregiver education & training, Endurance training, Functional mobility training     Restrictions  Position Activity Restriction  Other position/activity restrictions: WBAT    Subjective   General  Chart Reviewed:  Yes  Additional Pertinent Hx: Admit 1/12 with fall + L femur fx on films    ortho consult to OR 1/12 s/p LEFT HIP INTERTROCHANTERIC NAILING                                            PMHX: bilateral breast cancer, s/p left mastectomy 1975, right lumpectomy and radiation in 1999, DVT,hypothyroidism, esophageal spasm, schatzki's ring, GERD, hepatitis B, OA, spinal stenosis, Rt Hip Fx 2/2010, Thoracic and Lumbar compression fractures  Family / Caregiver Present: No  Diagnosis: L Femur fx s/p LEFT HIP INTERTROCHANTERIC NAILING  Subjective  Subjective: \" I can't stand on this leg\"  pt found resting in bed agreeable for OOB/OT eval and tx with encouragement     Social/Functional History  Social/Functional History  Lives With: Alone  Type of Home: Condo  Home Layout: One level  Home Access: Stairs to enter with rails  Entrance Stairs - Number of Steps: 5  Entrance Stairs - Rails: Right  Bathroom Shower/Tub: Walk-in shower  Bathroom Toilet: Standard  Bathroom Equipment: Grab bars in shower, Grab bars around toilet  Home Equipment: None  Has the patient had two or more falls in the past year or any fall with injury in the past year?: Yes (says her balance is poor)  Receives Help From: Family  ADL Assistance: Independent  Homemaking Assistance: Independent  Ambulation Assistance: Independent  Transfer Assistance: Independent  Active : Yes  Mode of Transportation: Car       Objective Treatment included functional transfer training, ADL's and pt. education. Safety Devices  Type of Devices: Call light within reach; Left in chair;Chair alarm in place;Nurse notified     Toilet Transfers  Toilet - Technique: Stand step  Equipment Used: Standard bedside commode  Toilet Transfer: 2 Person assistance; Moderate assistance  Toilet Transfers Comments: With walker and mod v.cues for tech and weight shift to move BLEs  AROM: Generally decreased, functional (R humeral fx 2022- January - limited shoulder mobility greater than 90)  Strength: Generally decreased, functional  Coordination: Generally decreased, functional  Tone: Normal  Sensation: Intact  ADL  Feeding: Setup  Grooming: Setup; Increased time to complete  Grooming Skilled Clinical Factors: seated  LE Dressing: Maximum assistance;Dependent/Total  LE Dressing Skilled Clinical Factors: with socks / brief management  Toileting: Dependent/Total;Maximum assistance  Toileting Skilled Clinical Factors: marrero cathter in place. Activity Tolerance  Activity Tolerance Comments: Patient tolerated treatment with a lot of encouragement and education. Patient presents with increased anxiety associated with moving the LLE and bearing weight through it.  Expect that this will resolve with confidence and pain management     Transfers  Sit to stand: Maximum assistance;2 Person assistance;Dependent/Total  Stand to sit: Maximum assistance  Transfer Comments: pt pulling up on walker anxious during session  Vision  Vision: Within Functional Limits  Hearing  Hearing: Within functional limits (does not currently have her hearing aids)  Cognition  Overall Cognitive Status: WFL  Cognition Comment: anxious and sleepy at times. / pain meds / anesthesia  Orientation  Overall Orientation Status: Within Functional Limits     Education Given To: Patient; Family  Education Provided: Role of Therapy;Plan of Care;IADL Safety;Precautions; ADL Adaptive Strategies;Transfer Training  Education Method: Demonstration;Verbal  Barriers to Learning: None (Pt anxious and needing freq reassurance)  Education Outcome: Continued education needed     AM-PAC Score  AM-PAC Inpatient Daily Activity Raw Score: 13 (01/13/23 1216)  AM-PAC Inpatient ADL T-Scale Score : 32.03 (01/13/23 1216)  ADL Inpatient CMS 0-100% Score: 63.03 (01/13/23 1216)  ADL Inpatient CMS G-Code Modifier : CL (01/13/23 1216)    Goals  Short Term Goals  Time Frame for Short Term Goals: at d/c  Short Term Goal 1: Stance x 4 mins with CGA x 1 for ADLs/ IADLs  Short Term Goal 2: Functional transfers with CGA  Short Term Goal 3: LE dressing with MIn A and AE prn  Patient Goals   Patient goals : Be able to go home soon       Therapy Time   Individual Concurrent Group Co-treatment   Time In 1120         Time Out 1216         Minutes 56             Timed Code Treatment Minutes:   40 mins    Total Treatment Minutes:  64 mins       Ondina Araya OT

## 2023-01-13 NOTE — PROGRESS NOTES
4 Eyes Admission Assessment     I agree as the admission nurse that 2 RN's have performed a thorough Head to Toe Skin Assessment on the patient. ALL assessment sites listed below have been assessed on admission. Areas assessed by both nurses:   [x]   Head, Face, and Ears   [x]   Shoulders, Back, and Chest  [x]   Arms, Elbows, and Hands   [x]   Coccyx, Sacrum, and Ischium  [x]   Legs, Feet, and Heels        Does the Patient have Skin Breakdown?   No         Jaspreet Prevention initiated:  Yes   Wound Care Orders initiated:  No      [facility] nurse consulted for Pressure Injury (Stage 3,4, Unstageable, DTI, NWPT, and Complex wounds) or Jaspreet score 18 or lower:  No      Nurse 1 eSignature: Electronically signed by Vandana Ramesh RN on 1/13/23 at 1:23 AM EST    **SHARE this note so that the co-signing nurse is able to place an eSignature**    Nurse 2 eSignature: Electronically signed by Gaston Vargas RN on 1/13/23 at 7:48 AM EST

## 2023-01-13 NOTE — PLAN OF CARE
Problem: Musculoskeletal - Adult  Goal: Return mobility to safest level of function  Outcome: Progressing  Flowsheets (Taken 1/12/2023 2038)  Return Mobility to Safest Level of Function: Assess patient stability and activity tolerance for standing, transferring and ambulating with or without assistive devices     Problem: Musculoskeletal - Adult  Goal: Maintain proper alignment of affected body part  Outcome: Progressing  Flowsheets (Taken 1/12/2023 2038)  Maintain proper alignment of affected body part: Support and protect limb and body alignment per provider's orders     Problem: Musculoskeletal - Adult  Goal: Return ADL status to a safe level of function  Recent Flowsheet Documentation  Taken 1/12/2023 2038 by Uriah Bourne RN  Return ADL Status to a Safe Level of Function: Administer medication as ordered     Problem: ABCDS Injury Assessment  Goal: Absence of physical injury  Recent Flowsheet Documentation  Taken 1/12/2023 2111 by Uriah Bourne RN  Absence of Physical Injury: Implement safety measures based on patient assessment

## 2023-01-13 NOTE — ANESTHESIA POSTPROCEDURE EVALUATION
Department of Anesthesiology  Postprocedure Note    Patient: Saqib Moscoso  MRN: [id number]  YOB: 1930  Date of evaluation: 1/12/2023      Procedure Summary     Date: 01/12/23 Room / Location: [facility] OR  / [facility]    Anesthesia Start: 5116 Anesthesia Stop: 1844    Procedure: LEFT HIP INTERTROCHOANTERIC NAILING (Left) Diagnosis:       Type I or II open fracture of left hip, initial encounter [facility])      (LEFT HIP FRACTURE)    Surgeons: Hussein Quiñonez MD Responsible Provider: Ashley Barrios DO    Anesthesia Type: general, regional ASA Status: 3          Anesthesia Type: No value filed.     Aristides Phase I: Aristides Score: 3    Aristides Phase II:        Anesthesia Post Evaluation    Patient location during evaluation: PACU  Patient participation: complete - patient participated  Level of consciousness: awake and alert  Airway patency: patent  Nausea & Vomiting: no nausea and no vomiting  Cardiovascular status: blood pressure returned to baseline  Respiratory status: acceptable  Hydration status: euvolemic

## 2023-01-13 NOTE — FLOWSHEET NOTE
Patient received from the OR to PACU #2 post LEFT HIP INTERTROCHOANTERIC NAILING - Left of Dr. Gayathri Sheppard. Placed on PACU monitoring equipment. Report given per CRNA and OR RN. Per report, patient required slight BP support intra op. States that patient could not have oral airway due to loose teeth in uppers. Sonorous, somewhat obstructed on arrival requiring jaw thrust and change of head/neck position for airway management. Still very sleepy from surgery with no complaints of pain. Had PENG block in the OR after surgery.

## 2023-01-13 NOTE — PROGRESS NOTES
PACU Transfer Note    Vitals:    01/12/23 2014   BP: 115/66   Pulse: 100   Resp: 15   Temp: 97.4 °F (36.3 °C)   SpO2: 98%       In: 350 [I.V.:350]  Out: 900 [Urine:900]    Pain assessment:  none, post nerve block  Pain Level: 0    Report given to Receiving unit RN by phone. Transferred with cell phone in hand to ready room in bed per pacu transport. Son took patient's purse and other belongings home with him.      1/12/2023 8:30 PM

## 2023-01-13 NOTE — FLOWSHEET NOTE
Son brought to bedside briefly for visit since visiting hours will be over soon. Left patient's cell phone with her.

## 2023-01-13 NOTE — PROGRESS NOTES
Patient BP 79/43. 73/33 on recheck 90/42 on recheck on other arm. Patient asymptomatic, no complaints of SOB or chest pain/ .  New orders placed 1L LR bolus given

## 2023-01-13 NOTE — PROGRESS NOTES
Patient admitted from PACU to room 6315. VS obtained  WNL. Patient denied any pain at this time. All her questions answered  to her satisfaction. Oriented to her room and staff. standard fall measures in place. Patient encouraged to use call light and call for assistance.

## 2023-01-13 NOTE — PROGRESS NOTES
[facility]   After review, this patient is felt to be:       []  Appropriate for Acute Inpatient Rehab    []  Appropriate for Acute Inpatient Rehab Pending Insurance Authorization    []  Not appropriate for Acute Inpatient Rehab    [x]  Referral received and ARU reviewing patient; Evaluation ongoing. No precert needed for ARU admission. Dr Peña Garfield to review. Will notify DCP with further updates. Thank you for the referral.    Ramiro Padilla OTR/L  Clinical Liaison [facility]  H)[phone] (L)[phone]   Electronically signed by Ramiro Padilla on 1/13/2023 at 4:11 PM    aZckary Duggan RN, BSN.   ARU Clinical Liaison  [facility]  Phone: [phone]  Fax: [phone]

## 2023-01-13 NOTE — PROGRESS NOTES
Orthopaedic Surgery Progress Note:    Fercho Ochoa is a 80 y.o. female now s/p Left femur IMN 1/12/2023     S:   Pt seen and examined  Pain: well controlled   Tolerating PO:   Nausea/emesis: none  Ambulation/therapy: not yet      O:   Vitals:    01/13/23 0352   BP: (!) 92/55   Pulse: 99   Resp: 17   Temp: 97.3 °F (36.3 °C)   SpO2: 97%       GEN: Alert, resting in bed, NAD  HEENT: NCAT  CV: normotensive  RESP: non-labored breathing    LLLE:   Dressing CDI, no hematoma or drainage   SILT L2-S1  Intact TA/EHL/GS  DP/PT 2+   Cap Refill< 2 sec     Labs:    Lab Results   Component Value Date    WBC 6.5 01/12/2023    HGB 15.0 01/12/2023    HCT 44.3 01/12/2023    MCV 91.3 01/12/2023     01/12/2023       Lab Results   Component Value Date     01/13/2023    K 4.3 01/13/2023    CO2 22 01/13/2023     01/13/2023    BUN 12 01/13/2023    CREATININE 0.8 01/13/2023           Intake/Output Summary (Last 24 hours) at 1/13/2023 0816  Last data filed at 1/13/2023 0630  Gross per 24 hour   Intake 3350 ml   Output 1800 ml   Net 1550 ml         A: Fercho Ochoa is a 80 y.o. female now s/p Left femur IMN 1/12/2023     P:         Activity:  PT/OT   ROM: AT  Antibiotics: Ancef post-op x 2 doses   Weight Bearing Status: WBAT LLE  Pain: multimodal   Diet: ADAT  Drains/marrero: remove  DVT PPx: OK for chemical DVT PPX   Imaging: none  Follow-up: 2 weeks  Dispo: pending pain control, PT/OT, per primary         Tan Marion MD  Orthopaedic Spine Surgery  [facility]  O: [phone]  C: [phone]  E: [email or web address]

## 2023-01-13 NOTE — PROGRESS NOTES
Physical Therapy  Facility/Department: [address] [place] [address]  Physical Therapy Initial Assessment    Name: Mikaela Fontenot  : 1930  MRN: [id number]  Date of Service: 2023    Discharge Recommendations: Mikaela Fontenot scored a  on the AM-PAC short mobility form. Current research shows that an AM-PAC score of 17 or less is typically not associated with a discharge to the patient's home setting. Based on the patient's AM-PAC score and their current functional mobility deficits, it is recommended that the patient have 5-7 sessions per week of Physical Therapy at d/c to increase the patient's independence. At this time, this patient demonstrates complex nursing, medical, and rehabilitative needs, and would benefit from intensive rehabilitation services upon discharge from the Inpatient setting. This patient demonstrates the ability to participate in and benefit from an intensive therapy program with a coordinated interdisciplinary team approach to foster frequent, structured, and documented communication among disciplines, who will work together to establish, prioritize, and achieve treatment goals. Please see assessment section for further patient specific details. If patient discharges prior to next session this note will serve as a discharge summary. Please see below for the latest assessment towards goals. Patient Diagnosis(es): The encounter diagnosis was Closed fracture of left hip, initial encounter ([facility] [address] Past Medical History:  has a past medical history of Cancer ([address] Hepatitis, Hyperlipidemia, and Osteoarthritis. Past Surgical History:  has a past surgical history that includes Mastectomy; Breast lumpectomy;  section; and hip surgery (Left, 2023). Assessment   Assessment: Patient presents post L femur fracture with increased pain and decreased mobility following surgery. Pt limited by anxiety, but does well with support from therapists.   Patient was very independent prior to fall and lives alone. She would benefit from intensive therapy to improve confidence with mobility and completing daily tasks safely. She has family support but will require increased strength and mobility at rehab to ensure safe return to her PLOF. She will continue to benefit from inpatient therapy during admission to initiate WB activity. Treatment Diagnosis: decreased functional mobility  Requires PT Follow-Up: Yes  Activity Tolerance  Activity Tolerance Comments: Patient tolerated treatment with a lot of encouragement and education. Patient presents with increased anxiety associated with moving the LLE and bearing weight through it. Expect that this will resolve with confidence and pain management     Plan   Physcial Therapy Plan  General Plan: 5-7 times per week  Safety Devices  Type of Devices: Call light within reach, Left in chair, Chair alarm in place, Nurse notified     Restrictions  Position Activity Restriction  Other position/activity restrictions: WBAT     Subjective   General  Chart Reviewed: Yes  Additional Pertinent Hx: Patient presents to ED post fall d/t loss of balance in her home. No acute findings on CT head and neck. Xray reveals intertrochanteric fracture of L femur and chronic interstitial lung disease. CT of chest/abdomen reveals pleural effusion bilaterally, stable compression fractures of thoracic spine, and hiatal hernia. Pt to OR 1/12 s/p L hip nailing. PMH: OA, hyperlipidemia, breast cancer, hepatitis  Diagnosis: other fracture of left femur, initial encounter for closed fracture  Subjective  Subjective: Patient supine at arrival. Patient states \"You can ask your questions and I'll answer but I amnot moving this leg. \"         Social/Functional History  Social/Functional History  Lives With: Alone  Type of Home: Condo  Home Layout: One level  Home Access: Stairs to enter with rails  Entrance Stairs - Number of Steps: 5  Entrance Stairs - Rails: Right  Bathroom Shower/Tub: Walk-in shower  Bathroom Toilet: Standard  Bathroom Equipment: Grab bars in shower, Grab bars around toilet  Home Equipment: None  Has the patient had two or more falls in the past year or any fall with injury in the past year?: Yes (says her balance is poor)  Receives Help From: Family  ADL Assistance: Independent  Homemaking Assistance: Independent  Ambulation Assistance: Independent  Transfer Assistance: Independent  Active : Yes  Mode of Transportation: Car  Vision/Hearing  Vision  Vision: Within Functional Limits  Hearing  Hearing: Within functional limits (does not currently have her hearing aids)    Cognition   Orientation  Overall Orientation Status: Within Functional Limits     Objective   Gross Assessment  AROM: Generally decreased, functional  Strength: Generally decreased, functional     Bed mobility  Supine to Sit: Maximum assistance;2 Person assistance  Transfers  Sit to Stand: Maximum Assistance;2 Person Assistance (c RW)  Stand to Sit: Maximum Assistance;2 Person Assistance (c RW)  Bed to Chair: Moderate assistance;2 Person Assistance (c RW)  Ambulation  Device: Rolling Walker  Assistance:  Moderate assistance;2 Person assistance  Quality of Gait: slow, patient has increased anxiety with WB on L LE but did so with encouragement  Distance: took a few steps from bed to chair  Comments: needed assist advancing BLE by facilitating weight shifting/offloading     Balance  Sitting - Static: Good  Sitting - Dynamic: Good  Standing - Static: Fair (RW modAx1)           OutComes Score  AM-PAC Score  AM-PAC Inpatient Mobility Raw Score : 11 (01/13/23 1217)  AM-PAC Inpatient T-Scale Score : 33.86 (01/13/23 1217)  Mobility Inpatient CMS 0-100% Score: 72.57 (01/13/23 1217)  Mobility Inpatient CMS G-Code Modifier : CL (01/13/23 1217)    Goals  Short Term Goals  Time Frame for Short Term Goals: discharge  Short Term Goal 1: supine to sit modA x1  Short Term Goal 2: sit to stand with TW modAx1  Short Term Goal 3: bed to chair transfer with RW modA x1  Short term goal 4:  ambulate 15ft with RW mod A x1  Patient Goals   Patient Goals : not stated       Education  Patient Education  Education Given To: Patient  Education Provided: Role of Therapy  Education Method: Demonstration  Barriers to Learning: None  Education Outcome: Verbalized understanding      Therapy Time   Individual Concurrent Group Co-treatment   Time In 1115         Time Out 1215         Minutes 60          Timed Code Treatment Minutes:   45    Total Treatment Minutes:  [facility]    Therapist was present, directed the patient's care, made skilled judgement, and was responsible for assessment and treatment of the patient.   Flakito Garvey, PT

## 2023-01-14 ENCOUNTER — APPOINTMENT (OUTPATIENT)
Dept: GENERAL RADIOLOGY | Age: 88
DRG: 480 | End: 2023-01-14
Payer: MEDICARE

## 2023-01-14 LAB
ABO/RH: NORMAL
ALBUMIN SERPL-MCNC: 3.3 G/DL (ref 3.4–5)
ANION GAP SERPL CALCULATED.3IONS-SCNC: 11 MMOL/L (ref 3–16)
ANTIBODY SCREEN: NORMAL
BASOPHILS ABSOLUTE: 0 K/UL (ref 0–0.2)
BASOPHILS RELATIVE PERCENT: 0.3 %
BUN BLDV-MCNC: 13 MG/DL (ref 7–20)
CALCIUM SERPL-MCNC: 8.8 MG/DL (ref 8.3–10.6)
CHLORIDE BLD-SCNC: 106 MMOL/L (ref 99–110)
CO2: 22 MMOL/L (ref 21–32)
CREAT SERPL-MCNC: 0.9 MG/DL (ref 0.6–1.2)
D DIMER: 0.7 UG/ML FEU (ref 0–0.6)
EOSINOPHILS ABSOLUTE: 0 K/UL (ref 0–0.6)
EOSINOPHILS RELATIVE PERCENT: 0 %
GFR SERPL CREATININE-BSD FRML MDRD: 60 ML/MIN/{1.73_M2}
GLUCOSE BLD-MCNC: 115 MG/DL (ref 70–99)
HCT VFR BLD CALC: 26.5 % (ref 36–48)
HEMOGLOBIN: 9 G/DL (ref 12–16)
LACTIC ACID: 2.2 MMOL/L (ref 0.4–2)
LYMPHOCYTES ABSOLUTE: 0.7 K/UL (ref 1–5.1)
LYMPHOCYTES RELATIVE PERCENT: 7.9 %
MCH RBC QN AUTO: 31.6 PG (ref 26–34)
MCHC RBC AUTO-ENTMCNC: 33.9 G/DL (ref 31–36)
MCV RBC AUTO: 93.2 FL (ref 80–100)
MONOCYTES ABSOLUTE: 1.3 K/UL (ref 0–1.3)
MONOCYTES RELATIVE PERCENT: 14.4 %
NEUTROPHILS ABSOLUTE: 6.8 K/UL (ref 1.7–7.7)
NEUTROPHILS RELATIVE PERCENT: 77.4 %
PDW BLD-RTO: 14.7 % (ref 12.4–15.4)
PHOSPHORUS: 3 MG/DL (ref 2.5–4.9)
PLATELET # BLD: 136 K/UL (ref 135–450)
PMV BLD AUTO: 7.8 FL (ref 5–10.5)
POTASSIUM SERPL-SCNC: 3.7 MMOL/L (ref 3.5–5.1)
PRO-BNP: 4740 PG/ML (ref 0–449)
RBC # BLD: 2.85 M/UL (ref 4–5.2)
SODIUM BLD-SCNC: 139 MMOL/L (ref 136–145)
TROPONIN: 0.04 NG/ML
TROPONIN: 0.13 NG/ML
TROPONIN: 0.15 NG/ML
WBC # BLD: 8.8 K/UL (ref 4–11)

## 2023-01-14 PROCEDURE — 6370000000 HC RX 637 (ALT 250 FOR IP): Performed by: STUDENT IN AN ORGANIZED HEALTH CARE EDUCATION/TRAINING PROGRAM

## 2023-01-14 PROCEDURE — 83605 ASSAY OF LACTIC ACID: CPT

## 2023-01-14 PROCEDURE — 6360000002 HC RX W HCPCS: Performed by: INTERNAL MEDICINE

## 2023-01-14 PROCEDURE — 2580000003 HC RX 258: Performed by: INTERNAL MEDICINE

## 2023-01-14 PROCEDURE — 1200000000 HC SEMI PRIVATE

## 2023-01-14 PROCEDURE — 93005 ELECTROCARDIOGRAM TRACING: CPT

## 2023-01-14 PROCEDURE — 6370000000 HC RX 637 (ALT 250 FOR IP): Performed by: INTERNAL MEDICINE

## 2023-01-14 PROCEDURE — 2580000003 HC RX 258: Performed by: STUDENT IN AN ORGANIZED HEALTH CARE EDUCATION/TRAINING PROGRAM

## 2023-01-14 PROCEDURE — 36415 COLL VENOUS BLD VENIPUNCTURE: CPT

## 2023-01-14 PROCEDURE — 84484 ASSAY OF TROPONIN QUANT: CPT

## 2023-01-14 PROCEDURE — 2580000003 HC RX 258

## 2023-01-14 PROCEDURE — 6360000002 HC RX W HCPCS: Performed by: STUDENT IN AN ORGANIZED HEALTH CARE EDUCATION/TRAINING PROGRAM

## 2023-01-14 PROCEDURE — 71045 X-RAY EXAM CHEST 1 VIEW: CPT

## 2023-01-14 PROCEDURE — 83880 ASSAY OF NATRIURETIC PEPTIDE: CPT

## 2023-01-14 PROCEDURE — 85379 FIBRIN DEGRADATION QUANT: CPT

## 2023-01-14 RX ORDER — AMIODARONE HYDROCHLORIDE 200 MG/1
200 TABLET ORAL 2 TIMES DAILY
Status: DISCONTINUED | OUTPATIENT
Start: 2023-01-14 | End: 2023-01-14

## 2023-01-14 RX ORDER — KETOROLAC TROMETHAMINE 15 MG/ML
15 INJECTION, SOLUTION INTRAMUSCULAR; INTRAVENOUS EVERY 6 HOURS PRN
Status: DISPENSED | OUTPATIENT
Start: 2023-01-14 | End: 2023-01-19

## 2023-01-14 RX ORDER — SODIUM CHLORIDE, SODIUM LACTATE, POTASSIUM CHLORIDE, AND CALCIUM CHLORIDE .6; .31; .03; .02 G/100ML; G/100ML; G/100ML; G/100ML
1000 INJECTION, SOLUTION INTRAVENOUS ONCE
Status: COMPLETED | OUTPATIENT
Start: 2023-01-14 | End: 2023-01-14

## 2023-01-14 RX ORDER — GABAPENTIN 100 MG/1
100 CAPSULE ORAL 3 TIMES DAILY
Status: DISCONTINUED | OUTPATIENT
Start: 2023-01-14 | End: 2023-01-23 | Stop reason: HOSPADM

## 2023-01-14 RX ORDER — OLANZAPINE 5 MG/1
5 TABLET ORAL NIGHTLY
Status: DISCONTINUED | OUTPATIENT
Start: 2023-01-14 | End: 2023-01-23 | Stop reason: HOSPADM

## 2023-01-14 RX ORDER — LIDOCAINE 4 G/G
2 PATCH TOPICAL DAILY
Status: DISCONTINUED | OUTPATIENT
Start: 2023-01-14 | End: 2023-01-23 | Stop reason: HOSPADM

## 2023-01-14 RX ORDER — KETOROLAC TROMETHAMINE 15 MG/ML
INJECTION, SOLUTION INTRAMUSCULAR; INTRAVENOUS
Status: DISCONTINUED
Start: 2023-01-14 | End: 2023-01-14 | Stop reason: WASHOUT

## 2023-01-14 RX ORDER — 0.9 % SODIUM CHLORIDE 0.9 %
1000 INTRAVENOUS SOLUTION INTRAVENOUS ONCE
Status: COMPLETED | OUTPATIENT
Start: 2023-01-14 | End: 2023-01-14

## 2023-01-14 RX ADMIN — AMIODARONE HYDROCHLORIDE 0.5 MG/MIN: 50 INJECTION, SOLUTION INTRAVENOUS at 16:15

## 2023-01-14 RX ADMIN — GABAPENTIN 100 MG: 100 CAPSULE ORAL at 19:49

## 2023-01-14 RX ADMIN — ACETAMINOPHEN 650 MG: 325 TABLET ORAL at 14:47

## 2023-01-14 RX ADMIN — PANTOPRAZOLE SODIUM 40 MG: 40 TABLET, DELAYED RELEASE ORAL at 08:49

## 2023-01-14 RX ADMIN — ONDANSETRON 4 MG: 4 TABLET, ORALLY DISINTEGRATING ORAL at 16:21

## 2023-01-14 RX ADMIN — AMIODARONE HYDROCHLORIDE 1 MG/MIN: 50 INJECTION, SOLUTION INTRAVENOUS at 21:32

## 2023-01-14 RX ADMIN — SODIUM CHLORIDE, PRESERVATIVE FREE 10 ML: 5 INJECTION INTRAVENOUS at 21:35

## 2023-01-14 RX ADMIN — SODIUM CHLORIDE, POTASSIUM CHLORIDE, SODIUM LACTATE AND CALCIUM CHLORIDE 1000 ML: 600; 310; 30; 20 INJECTION, SOLUTION INTRAVENOUS at 08:50

## 2023-01-14 RX ADMIN — ATORVASTATIN CALCIUM 10 MG: 10 TABLET, FILM COATED ORAL at 19:49

## 2023-01-14 RX ADMIN — AMIODARONE HYDROCHLORIDE 1 MG/MIN: 50 INJECTION, SOLUTION INTRAVENOUS at 09:57

## 2023-01-14 RX ADMIN — SODIUM CHLORIDE 1000 ML: 9 INJECTION, SOLUTION INTRAVENOUS at 06:31

## 2023-01-14 RX ADMIN — AMIODARONE HYDROCHLORIDE 150 MG: 50 INJECTION, SOLUTION INTRAVENOUS at 09:29

## 2023-01-14 RX ADMIN — HEPARIN SODIUM 5000 UNITS: 5000 INJECTION INTRAVENOUS; SUBCUTANEOUS at 08:49

## 2023-01-14 RX ADMIN — ACETAMINOPHEN 650 MG: 325 TABLET ORAL at 03:53

## 2023-01-14 RX ADMIN — SODIUM CHLORIDE, PRESERVATIVE FREE 10 ML: 5 INJECTION INTRAVENOUS at 21:34

## 2023-01-14 RX ADMIN — LEVOTHYROXINE SODIUM 112 MCG: 0.11 TABLET ORAL at 08:49

## 2023-01-14 RX ADMIN — OLANZAPINE 5 MG: 5 TABLET, FILM COATED ORAL at 19:55

## 2023-01-14 RX ADMIN — HYDROMORPHONE HYDROCHLORIDE 0.25 MG: 1 INJECTION, SOLUTION INTRAMUSCULAR; INTRAVENOUS; SUBCUTANEOUS at 16:09

## 2023-01-14 RX ADMIN — KETOROLAC TROMETHAMINE 15 MG: 15 INJECTION, SOLUTION INTRAMUSCULAR; INTRAVENOUS at 21:05

## 2023-01-14 RX ADMIN — HEPARIN SODIUM 5000 UNITS: 5000 INJECTION INTRAVENOUS; SUBCUTANEOUS at 16:20

## 2023-01-14 RX ADMIN — OXYCODONE HYDROCHLORIDE 10 MG: 5 TABLET ORAL at 17:48

## 2023-01-14 RX ADMIN — HYDROMORPHONE HYDROCHLORIDE 0.25 MG: 1 INJECTION, SOLUTION INTRAMUSCULAR; INTRAVENOUS; SUBCUTANEOUS at 06:29

## 2023-01-14 ASSESSMENT — PAIN SCALES - GENERAL
PAINLEVEL_OUTOF10: 0
PAINLEVEL_OUTOF10: 0
PAINLEVEL_OUTOF10: 10
PAINLEVEL_OUTOF10: 0
PAINLEVEL_OUTOF10: 0
PAINLEVEL_OUTOF10: 10
PAINLEVEL_OUTOF10: 0
PAINLEVEL_OUTOF10: 10
PAINLEVEL_OUTOF10: 10

## 2023-01-14 ASSESSMENT — PAIN DESCRIPTION - LOCATION
LOCATION: HIP;KNEE
LOCATION: HIP

## 2023-01-14 ASSESSMENT — PAIN DESCRIPTION - FREQUENCY: FREQUENCY: CONTINUOUS

## 2023-01-14 ASSESSMENT — PAIN - FUNCTIONAL ASSESSMENT: PAIN_FUNCTIONAL_ASSESSMENT: PREVENTS OR INTERFERES SOME ACTIVE ACTIVITIES AND ADLS

## 2023-01-14 ASSESSMENT — PAIN SCALES - WONG BAKER
WONGBAKER_NUMERICALRESPONSE: 8
WONGBAKER_NUMERICALRESPONSE: 8

## 2023-01-14 ASSESSMENT — PAIN DESCRIPTION - ORIENTATION
ORIENTATION: LEFT
ORIENTATION: LEFT

## 2023-01-14 ASSESSMENT — PAIN DESCRIPTION - PAIN TYPE: TYPE: SURGICAL PAIN

## 2023-01-14 ASSESSMENT — PAIN DESCRIPTION - DESCRIPTORS
DESCRIPTORS: ACHING
DESCRIPTORS: ACHING

## 2023-01-14 NOTE — PROGRESS NOTES
Hospitalist Progress Note      PCP: Lyndsay Hoyos    Date of Admission: 1/12/2023    Chief Complaint: worsening lethargy      Subjective:     Per nursing staff, patient sleeping most of day. She wakes intermittently and speaks briefly.       Medications:  Reviewed    Infusion Medications    lactated ringers 75 mL/hr at 01/13/23 2313    sodium chloride 25 mL/hr at 01/13/23 0932     Scheduled Medications    sodium chloride flush  5-40 mL IntraVENous 2 times per day    heparin (porcine)  5,000 Units SubCUTAneous 3 times per day    levothyroxine  112 mcg Oral QAM AC    atorvastatin  10 mg Oral Nightly    pantoprazole  40 mg Oral QAM AC    midazolam  0.5 mg IntraVENous Once    sodium chloride flush  5-40 mL IntraVENous 2 times per day     PRN Meds: perflutren lipid microspheres, sodium chloride flush, ondansetron **OR** ondansetron, polyethylene glycol, acetaminophen **OR** acetaminophen, clonazePAM, oxyCODONE **OR** oxyCODONE, simethicone, HYDROmorphone, sodium chloride flush, sodium chloride      Intake/Output Summary (Last 24 hours) at 1/14/2023 0050  Last data filed at 1/13/2023 2305  Gross per 24 hour   Intake 240 ml   Output 1625 ml   Net -1385 ml       Physical Exam Performed:    BP (!) 96/58   Pulse (!) 125   Temp 98.2 °F (36.8 °C) (Oral)   Resp 20   Ht 5' 1\" (1.549 m)   Wt 138 lb 10.7 oz (62.9 kg)   SpO2 92%   BMI 26.20 kg/m²     GEN alert, in distressed about her pain  HEENT normocephalic, anicteric sclera, EOMI, mucosa moist, no stridor  NECK supple, trachea midline  RESP on RA, in no distress, clear to auscultation  CARDS RRR, S1, S2, systolic murmurs over RUSB, LUSB, no edema, radial pulse 2+, DP pulse 2+  ABD distended, +BS, soft nontender  MSK left hip tenderness, no cyanosis, no clubbing  SKIN warm, dry  NEURO alert, oriented x 3, no facial asymmetry, no dysarthria, moving spontaneously  PSYCH normal mood      Labs:   Recent Labs     01/12/23  0912 01/13/23  2354   WBC 6.5 8.8   HGB 15.0 9.0*   HCT 44.3 26.5*    136     Recent Labs     01/12/23  1449 01/13/23  0616 01/13/23  2354    137 139   K 4.1 4.3 3.7    105 106   CO2 22 22 22   BUN 10 12 13   CREATININE 0.8 0.8 0.9   CALCIUM 9.7 8.8 8.8   PHOS 4.0 5.1* 3.0     Recent Labs     01/12/23  1449 01/13/23  0616   AST 19 16   ALT 11 8*   BILITOT 1.0 0.5   ALKPHOS 55 37*     Recent Labs     01/12/23  0912   INR 1.10     Recent Labs     01/12/23  1706 01/12/23  2220   TROPONINI <0.01 <0.01       Urinalysis:      Lab Results   Component Value Date/Time    NITRU Negative 01/13/2023 03:16 AM    WBCUA 0-2 01/13/2023 03:16 AM    RBCUA  01/13/2023 03:16 AM    BLOODU MODERATE 01/13/2023 03:16 AM    SPECGRAV 1.025 01/13/2023 03:16 AM    GLUCOSEU Negative 01/13/2023 03:16 AM       Radiology:  CT HEAD WO CONTRAST   Final Result      Chronic ischemic changes of the brain and atrophy with no midline shift or hydrocephalus or definitive hemorrhage. Extensive artifact noted likely from patient motion            XR TIBIA FIBULA LEFT (2 VIEWS)   Final Result      Normal radiographs of the left tibia and fibula. Knee compartment narrowing appreciated. Left ankle 3 view:      HISTORY: Trauma:      FINDINGS:      AP lateral oblique views demonstrate normal alignment. There is no acute fracture or dislocation      IMPRESSION:      No acute fracture       LEFT HIP 2 VIEW , AP view pelvis      HISTORY: Ankita Riding with hip pain, previous left hip pinning      PROCEDURE: AP view the pelvis and lateral view of left hip were obtained      COMPARISON: Intraoperative studies from January 12, 2023      FINDINGS:      There is normal alignment of the right hip with 4 screws noted traversing the trochanter femoral neck and head from previous fracture fixation without significant displacement, unchanged.       Left trochanteric hip fixation is intact with transfixation screws through the trochanter femoral neck and long intramedullary amaris remaining in stable position compared to recent study. No discrete acute fracture or dislocation. No expansile cystic or destructive change identified      AP view of the pelvis demonstrates no acute bony defect. Diffuse demineralization noted. Degenerative changes lower lumbar spine. The sacroiliac margins appear normal.    There is some minimal sclerosis along the acetabulum      IMPRESSION:      No acute deformity involving the right or left hip. Previous pinning of the right hip is unchanged. Previous fracture fixation of the left trochanteric and hip is intact      No acute bony defect in the pelvis. Diffuse demineralization            XR SHOULDER LEFT (MIN 2 VIEWS)   Final Result      Chronic advanced arthropathy of the left shoulder with loose bodies and subarticular erosions affecting the humeral head and advanced glenohumeral arthritic change and remodeling         XR ANKLE LEFT (2 VIEWS)   Final Result      Normal radiographs of the left tibia and fibula. Knee compartment narrowing appreciated. Left ankle 3 view:      HISTORY: Trauma:      FINDINGS:      AP lateral oblique views demonstrate normal alignment. There is no acute fracture or dislocation      IMPRESSION:      No acute fracture       LEFT HIP 2 VIEW , AP view pelvis      HISTORY: Carmen Libel with hip pain, previous left hip pinning      PROCEDURE: AP view the pelvis and lateral view of left hip were obtained      COMPARISON: Intraoperative studies from January 12, 2023      FINDINGS:      There is normal alignment of the right hip with 4 screws noted traversing the trochanter femoral neck and head from previous fracture fixation without significant displacement, unchanged. Left trochanteric hip fixation is intact with transfixation screws through the trochanter femoral neck and long intramedullary amaris remaining in stable position compared to recent study. No discrete acute fracture or dislocation.       No expansile cystic or destructive change identified      AP view of the pelvis demonstrates no acute bony defect. Diffuse demineralization noted. Degenerative changes lower lumbar spine. The sacroiliac margins appear normal.    There is some minimal sclerosis along the acetabulum      IMPRESSION:      No acute deformity involving the right or left hip. Previous pinning of the right hip is unchanged. Previous fracture fixation of the left trochanteric and hip is intact      No acute bony defect in the pelvis. Diffuse demineralization            XR KNEE RIGHT (1-2 VIEWS)   Final Result      1. Right knee: Advanced arthritic change as described above. No sign of any fracture or acute deformity. 2. Left knee: Moderate degenerative change particularly involving the lateral compartment with some prepatellar soft tissue swelling. No acute defect      XR HIP 2-3 VW W PELVIS LEFT   Final Result      Normal radiographs of the left tibia and fibula. Knee compartment narrowing appreciated. Left ankle 3 view:      HISTORY: Trauma:      FINDINGS:      AP lateral oblique views demonstrate normal alignment. There is no acute fracture or dislocation      IMPRESSION:      No acute fracture       LEFT HIP 2 VIEW , AP view pelvis      HISTORY: Genaro Dotter with hip pain, previous left hip pinning      PROCEDURE: AP view the pelvis and lateral view of left hip were obtained      COMPARISON: Intraoperative studies from January 12, 2023      FINDINGS:      There is normal alignment of the right hip with 4 screws noted traversing the trochanter femoral neck and head from previous fracture fixation without significant displacement, unchanged. Left trochanteric hip fixation is intact with transfixation screws through the trochanter femoral neck and long intramedullary amaris remaining in stable position compared to recent study. No discrete acute fracture or dislocation.       No expansile cystic or destructive change identified      AP view of the pelvis demonstrates no acute bony defect. Diffuse demineralization noted. Degenerative changes lower lumbar spine. The sacroiliac margins appear normal.    There is some minimal sclerosis along the acetabulum      IMPRESSION:      No acute deformity involving the right or left hip. Previous pinning of the right hip is unchanged. Previous fracture fixation of the left trochanteric and hip is intact      No acute bony defect in the pelvis. Diffuse demineralization            XR KNEE LEFT (1-2 VIEWS)   Final Result      1. Right knee: Advanced arthritic change as described above. No sign of any fracture or acute deformity. 2. Left knee: Moderate degenerative change particularly involving the lateral compartment with some prepatellar soft tissue swelling. No acute defect      XR HIP LEFT (2-3 VIEWS)   Final Result      Intraoperative fluoroscopy for open reduction internal fixation of the left hip. Refer to the operative note for details. FLUORO FOR SURGICAL PROCEDURES   Final Result      Intraoperative fluoroscopy for open reduction internal fixation of the left hip. Refer to the operative note for details. CT CHEST ABDOMEN PELVIS W CONTRAST Additional Contrast? None   Final Result      CHEST:   1.  Small right pleural effusion and trace left pleural effusion. Mild bibasilar atelectasis. 2.  Stable remote compression deformities at T12 and L1 compared to prior MRI from 2015. There is also new compression deformity at T11 compared to the study, but this is age indeterminant and may also be remote. Recommend correlation with point    tenderness in this level. 3.  Motion limited exam without definite evidence of acute traumatic intrathoracic abnormality. 4.  Moderate hiatal hernia. ABDOMEN AND PELVIS:   1.  Mildly displaced comminuted intratrochanteric fracture of the left femur. 2.  Surrounding stranding/hematoma in the left thigh as detailed above.    3.  No additional acute traumatic abnormality identified in the abdomen and pelvis per         CT CERVICAL SPINE WO CONTRAST   Final Result   1. No acute traumatic abnormality of the cervical spine. 2. Stable multilevel degenerative disc disease and facet arthropathy. 3. Stable slight rotatory subluxation of C1 on C2 on the right side. CT HEAD WO CONTRAST   Final Result      1. Stable exam with no acute intracranial abnormality. XR FEMUR LEFT (MIN 2 VIEWS)   Final Result   Addendum (preliminary) 1 of 1   [ADDENDUM #1   Addendum: Initial report by Dr. Jt Ponce. Addendum by Dr. Heidi Bernardo. Frontal view of the pelvis and frontal and frog-leg lateral views of the    left femur were obtained. There is a nondisplaced intertrochanteric    fracture of the left femur. No dislocation. Orthopedic hardware of the    proximal right femur is noted. No    additional fracture deformity. Final   Nondisplaced intertrochanteric fracture on the left. IMPRESSION:      No pneumothorax. Diffusely prominent interstitial markings presumably representing chronic interstitial lung disease. No prior for comparison. Normal cardiomediastinal silhouette. Presumed old fracture deformity of the proximal left humerus. If there is left shoulder pain then dedicated radiographs are indicated. XR PELVIS (1-2 VIEWS)   Final Result   Addendum (preliminary) 1 of 1   [ ADDENDUM #1 *   Addendum: Initial report by Dr. Jt Ponce. Addendum by Dr. Heidi Bernardo. Frontal view of the pelvis and frontal and frog-leg lateral views of the    left femur were obtained. There is a nondisplaced intertrochanteric    fracture of the left femur. No dislocation. Orthopedic hardware of the    proximal right femur is noted. No    additional fracture deformity. Final   Nondisplaced intertrochanteric fracture on the left. IMPRESSION:      No pneumothorax.       Diffusely prominent interstitial markings presumably representing chronic interstitial lung disease. No prior for comparison. Normal cardiomediastinal silhouette. Presumed old fracture deformity of the proximal left humerus. If there is left shoulder pain then dedicated radiographs are indicated. XR CHEST PORTABLE   Final Result   Addendum (preliminary) 1 of 1   [ADDENDUM #1    Addendum: Initial report by Dr. Sebas Ibarra. Addendum by Dr. Jaimie Doherty. Frontal view of the pelvis and frontal and frog-leg lateral views of the    left femur were obtained. There is a nondisplaced intertrochanteric    fracture of the left femur. No dislocation. Orthopedic hardware of the    proximal right femur is noted. No    additional fracture deformity. Final   Nondisplaced intertrochanteric fracture on the left. IMPRESSION:      No pneumothorax. Diffusely prominent interstitial markings presumably representing chronic interstitial lung disease. No prior for comparison. Normal cardiomediastinal silhouette. Presumed old fracture deformity of the proximal left humerus. If there is left shoulder pain then dedicated radiographs are indicated. Assessment/Plan: This is a 79 yo Female, with history of bilateral breast cancer, s/p left mastectomy 1975, right lumpectomy and radiation in 1999, DVT 2009 while on Evista, hypothyroidism, esophageal spasm, schatzki's ring, EGD with dilation 1/2011, GERD, hepatitis B, OA, spinal stenosis, Rt Hip Fracture s/p surgery 2/2010, Thoracic and Lumbar compression fractures, who presents for evaluation after fall. She was found to have sustained mildly displaced comminuted intratrochanteric fracture of the left femur. Patient is alert, oriented, and coherent. Daughter in-law, Emeka Angel, is present and assists with clinical history. This morning patient walked from her bedroom to the bathroom. When she reached the bathroom, she felt lightheaded. States that she must have turned around, lost her balance, and fell onto her left side. After falling she was in significant pain and was unable to rise on her own. Patient has a  who comes every morning to walk her dog at ~ 730 AM.    Knowing that her  was coming, patient screamed out for help. Reportedly she was found likely on her left side on the bathroom floor. Her  called EMS and patient was taken to [facility]. for evaluation. Patient has been in her usual state of health. She walks about 1 mile daily without assistance. She used to walk longer distances. Patient denies recent fevers, chills, cough, shortness of breath, chest pain, palpitations at rest and on exertion. She denies history of nausea, vomiting, abdominal pain, dysuria, diarrhea. She eats three meals daily. States that she does not eat much during lunchtime. Daughter in-law, Jose Magdaleno said patient had another fall in February 2022 while walking her dog; she was found on the side walk. After, she has been working with PT/OT to work on her strength and stability. She may have fallen a couple of times after. Active Hospital Problems    Diagnosis Date Noted    Other fracture of left femur, initial encounter for closed fracture ([facility] [address] [S72.8X2A] 01/12/2023     Priority: Medium       Mildly Displaced Comminuted Intra-trochanteric Fracture of Left Femur  - CT A/P with contrast 1/12/23 showed mildly displaced comminuted intratrochanteric fracture of the left femur. Surrounding stranding/hematoma in the left thigh as detailed above. - s/p ORIF of left femoral fracture with CMN on 1/12/23  - monitor for post-op complications. - Pain control -- sensitive to narcotics. - Bowel regimen. Recurrent Falls  Lightheadedness  - walks 1 mile daily without assistive device. - Had a fall in February 2022 while walking her dog.  - she may have fallen a couple more times after, then today 1/12.   -- prior to falling today -- she felt lightheaded, turned and lost her balance resulting in her fall. -- adm labs showed elevated lactic acid and ECHO showed severe aortic stenosis. - CT of Head wo contrast 1/12 showed no acute intracranial process. - CT Cervical Spine wo contrast 1/12 showed no acute traumatic abnormality of the cervical spine. Stable multilevel degenerative disc disease and facet arthropathy. Stable slight rotatory subluxation of C1 on C2 on the right side.  - B12, folate, vitamin D5OH. Elevated Lactic Acid  - adm lactic acid 3.0.  - Administered 2 L LR, then started LR infusion 75 cc/hr. - lactic acid normalized after receiving IVF's. Urinary Retention  - bladder scan showed > 500 cc urine.    - attempted marrero placement by RN staff, unsuccessful.  - consulted urology. LV with Grade II Diastolic Dysfunction  Severe Aortic Stenosis, Symptomatic  Mild to Moderate Mitral Regurgitation  Mild to Moderate Mitral Stenosis  - ECHO 1/12/23 showed normal LV cavity size, wall thickness, LVEF 60-65%, no regional wall motion abnormalities. Grade II diastolic dysfunction. Mitral valve leaflets are thickened/calcified. MAC. Mild to moderate MR and MS. Severe aortic stenosis with peak velocity of 5.3 m/s, mean pressure gradient of 66 mmHg. Mild TR. PSAP 48 mmHg. - Reports lightheadedness on rising. When she fell on 1/12 -- when she walked to the bathroom she was not initially lightheaded; after reaching the bathroom she felt lightheaded. She denies shortness of breath, chest pain, palpitations on exertion. - cardiology consulted for consideration of TAVR. Patient extremely functional; walks 1 mile daily without assistive device.      Small Right Pleural Effusion  Trace Left Pleural Effusion  - in the setting of valvular disease and LV with grade II diastolic dysfunction     Hypothyroidism  - Continue home levothyroxine 112 mcg daily.  - TSH, free T4 (pharmacy verified that she has not filled her prescription in several months.)     Remote Compression Deformities at T12, L1  New Compression Deformity at T11  OA  - vitamin D25OH level. History of Rt Hip Fracture   - s/p surgery 2/2010     History of bilateral breast cancer  - s/p left mastectomy 1975  - s/p right lumpectomy and radiation in 1999     DVT 2009   - thought possible related to Evista  - DVT ppx. Moderate Hiatal Hernia  GERD  History of Esophageal spasm  History of Schatzki's ring  - S/p EGD with dilation 1/2011  - Continue home omeprazole 40 mg daily. History of Hepatitis B       DVT Prophylaxis:   Diet: ADULT ORAL NUTRITION SUPPLEMENT; Breakfast, Lunch, Dinner; Standard High Calorie/High Protein Oral Supplement  ADULT DIET;  Regular; Low Fat/Low Chol/High Fiber/2 gm Na  Code Status: Full Code    PT/OT Eval Status: PT/OT    Dispo -     Marilin Nielsen MD

## 2023-01-14 NOTE — SIGNIFICANT EVENT
Significant Event Note    Patient Name: Isela Smith Date: 1/12/2023   Code:Full Code  PCP: Olimpia Spring   Attending: Breanna Plasencia MD    Chief Complaint: Hip Pain (Left, s/p fall)     Encounter Diagnosis:     ICD-10-CM    1. Cl[facility] of left [facility], [facility]  S72.002A         Rapid was called for room 6310 at 6:30 AM    On arrival we were informed by the nurse that patient was complaining of severe chest pain and left leg pain    Initial assessment:  Patient was complaining of chest pain describing as sharp and heartburn. Vitals showed hypertension and 80s with tachycardia of 140s. Patient had been like that for overnight. Patient was not hypoxic or in respiratory distress. Interventions:  EKG was done which showed A. fib with RVR, CXR and tropes were ordered and patient was given 1 L of LR, and Dilaudid. Reevaluation:  Pain improved with the Dilaudid, blood pressure has been stable in 110s and tachycardia was improved to 120s    Assessment and Plan  Seems like patient had recent fall on her face and she hit her chest.  Patient also has a history of esophageal spasm. However her vitals has been like that overnight so it was not an acute change in vitals. Patient condition improved with Dilaudid. Will follow up on the labs and do reassessment of this patient.     Silvestre So MD, PGY-1  01/14/23  6:45 AM

## 2023-01-14 NOTE — PROGRESS NOTES
Pt removed both PIVs per self. Bed linens changed and pt cleaned, pain medication given. MD notified that RN was unable to get new access on pt as pt was tearful and scared.

## 2023-01-14 NOTE — PROGRESS NOTES
Patient's HR suddenly shot up to 160's on tele and patient c/o \"heartburn\" and chest pain. BP 77/44. Rapid response called. EKG and Chest x-ray complete, labs drawn. Normal saline bolus started wide open. Pt c/o excruciating pain in L hip/knee. Dilaudid 0.25mg once given per orders, MDs at bedside. Patient's BP had been running in the 90's and HR in the 120's overnight prior to this. MD Sandrita Rogers was notified and labs were drawn. Tylenol and ice packs were given due to pressure being too soft for opioids at the time. Dilaudid has calmed patient however HR remains 144, BP 82/51 post fluid bolus.    Handoff completed to Yamile Francois

## 2023-01-14 NOTE — PROGRESS NOTES
Orthopaedic Surgery Progress Note:    Driss Lora is a 80 y.o. female now s/p Left femur IMN 1/12/2023     S:   Pt seen and examined  Mental status this AM poor, AAOx1  Complains of L hip pain    Had fall from bed yesterday evening. Overnight early this AM had chest pain, HTN, tachycardia initiating rapid response. O:   Vitals:    01/14/23 0930   BP: (!) 79/54   Pulse: (!) 147   Resp: 18   Temp: 97.6 °F (36.4 °C)   SpO2: 92%       GEN: Alert, resting in bed, NAD  HEENT: NCAT  CV: normotensive  RESP: non-labored breathing    LLE:   Dressing CDI, no hematoma or drainage   NO TTP knee, tibia, ankle  Unable to assess motor and sensation 2/2 AMS  Cap Refill< 2 sec     No pain with passive ROM of BL upper extremities   No TTP over bilateral wrist, elbows, shoulder  No significant swelling or bruising b/l upper extremities   Unable to assess motor and sensation 2/2 AMS    Negative log roll RLE  No significant TTP Right femur, knee, tibia, ankle   Unable to assess motor and sensation 2/2 AMS        Labs:    Lab Results   Component Value Date    WBC 8.8 01/13/2023    HGB 9.0 (L) 01/13/2023    HCT 26.5 (L) 01/13/2023    MCV 93.2 01/13/2023     01/13/2023       Lab Results   Component Value Date     01/13/2023    K 3.7 01/13/2023    CO2 22 01/13/2023     01/13/2023    BUN 13 01/13/2023    CREATININE 0.9 01/13/2023           Intake/Output Summary (Last 24 hours) at 1/14/2023 1028  Last data filed at 1/14/2023 0409  Gross per 24 hour   Intake --   Output 1525 ml   Net -1525 ml         A: Driss Lora is a 80 y.o. female now s/p Left femur IMN 1/12/2023     Had fall overnight, Xrs reviewed. Hardware intact without complication, no other fractures identified. Secondary this AM limited by AMS but no clear new injuries.  Will continue to follow for secondary exam.     This AM had rapid response, is altered this AM.     P:         Activity:  PT/OT   ROM: AT  Antibiotics: Ancef post-op x 2 doses   Weight Bearing Status: WBAT LLE  Pain: multimodal   Diet: ADAT  marrero: per urology   DVT PPx: OK for chemical DVT PPX   Imaging: none  Follow-up: 2 weeks  Dispo: pending pain control, PT/OT, per primary         Tan Marion MD  Orthopaedic Spine Surgery  [facility]  O: [phone]  C: [phone]  E: [email or web address]

## 2023-01-15 LAB
EKG ATRIAL RATE: 170 BPM
EKG DIAGNOSIS: NORMAL
EKG Q-T INTERVAL: 328 MS
EKG QRS DURATION: 106 MS
EKG QTC CALCULATION (BAZETT): 506 MS
EKG R AXIS: 129 DEGREES
EKG T AXIS: 41 DEGREES
EKG VENTRICULAR RATE: 143 BPM
TROPONIN: 0.16 NG/ML
TROPONIN: 0.16 NG/ML
TROPONIN: 0.18 NG/ML
TROPONIN: 0.19 NG/ML

## 2023-01-15 PROCEDURE — 97530 THERAPEUTIC ACTIVITIES: CPT

## 2023-01-15 PROCEDURE — 2580000003 HC RX 258: Performed by: STUDENT IN AN ORGANIZED HEALTH CARE EDUCATION/TRAINING PROGRAM

## 2023-01-15 PROCEDURE — 97110 THERAPEUTIC EXERCISES: CPT

## 2023-01-15 PROCEDURE — 94761 N-INVAS EAR/PLS OXIMETRY MLT: CPT

## 2023-01-15 PROCEDURE — 84484 ASSAY OF TROPONIN QUANT: CPT

## 2023-01-15 PROCEDURE — 2580000003 HC RX 258: Performed by: INTERNAL MEDICINE

## 2023-01-15 PROCEDURE — 6370000000 HC RX 637 (ALT 250 FOR IP): Performed by: INTERNAL MEDICINE

## 2023-01-15 PROCEDURE — 6360000002 HC RX W HCPCS: Performed by: INTERNAL MEDICINE

## 2023-01-15 PROCEDURE — 2700000000 HC OXYGEN THERAPY PER DAY

## 2023-01-15 PROCEDURE — 1200000000 HC SEMI PRIVATE

## 2023-01-15 PROCEDURE — 93010 ELECTROCARDIOGRAM REPORT: CPT | Performed by: INTERNAL MEDICINE

## 2023-01-15 PROCEDURE — 36415 COLL VENOUS BLD VENIPUNCTURE: CPT

## 2023-01-15 PROCEDURE — 6370000000 HC RX 637 (ALT 250 FOR IP): Performed by: STUDENT IN AN ORGANIZED HEALTH CARE EDUCATION/TRAINING PROGRAM

## 2023-01-15 RX ORDER — HEPARIN SODIUM 5000 [USP'U]/ML
5000 INJECTION, SOLUTION INTRAVENOUS; SUBCUTANEOUS EVERY 8 HOURS SCHEDULED
Status: DISCONTINUED | OUTPATIENT
Start: 2023-01-15 | End: 2023-01-23 | Stop reason: HOSPADM

## 2023-01-15 RX ADMIN — SODIUM CHLORIDE, POTASSIUM CHLORIDE, SODIUM LACTATE AND CALCIUM CHLORIDE: 600; 310; 30; 20 INJECTION, SOLUTION INTRAVENOUS at 15:57

## 2023-01-15 RX ADMIN — SODIUM CHLORIDE, PRESERVATIVE FREE 10 ML: 5 INJECTION INTRAVENOUS at 20:02

## 2023-01-15 RX ADMIN — ATORVASTATIN CALCIUM 10 MG: 10 TABLET, FILM COATED ORAL at 20:02

## 2023-01-15 RX ADMIN — KETOROLAC TROMETHAMINE 15 MG: 15 INJECTION, SOLUTION INTRAMUSCULAR; INTRAVENOUS at 20:14

## 2023-01-15 RX ADMIN — OXYCODONE HYDROCHLORIDE 10 MG: 5 TABLET ORAL at 15:43

## 2023-01-15 RX ADMIN — CLONAZEPAM 1 MG: 1 TABLET ORAL at 20:13

## 2023-01-15 RX ADMIN — OXYCODONE HYDROCHLORIDE 10 MG: 5 TABLET ORAL at 05:25

## 2023-01-15 RX ADMIN — AMIODARONE HYDROCHLORIDE 0.5 MG/MIN: 50 INJECTION, SOLUTION INTRAVENOUS at 08:18

## 2023-01-15 RX ADMIN — SODIUM CHLORIDE, POTASSIUM CHLORIDE, SODIUM LACTATE AND CALCIUM CHLORIDE: 600; 310; 30; 20 INJECTION, SOLUTION INTRAVENOUS at 03:02

## 2023-01-15 RX ADMIN — GABAPENTIN 100 MG: 100 CAPSULE ORAL at 20:02

## 2023-01-15 RX ADMIN — HEPARIN SODIUM 5000 UNITS: 5000 INJECTION INTRAVENOUS; SUBCUTANEOUS at 20:13

## 2023-01-15 RX ADMIN — HYDROMORPHONE HYDROCHLORIDE 0.25 MG: 1 INJECTION, SOLUTION INTRAMUSCULAR; INTRAVENOUS; SUBCUTANEOUS at 08:21

## 2023-01-15 RX ADMIN — GABAPENTIN 100 MG: 100 CAPSULE ORAL at 10:54

## 2023-01-15 RX ADMIN — OLANZAPINE 5 MG: 5 TABLET, FILM COATED ORAL at 20:02

## 2023-01-15 RX ADMIN — GABAPENTIN 100 MG: 100 CAPSULE ORAL at 15:44

## 2023-01-15 RX ADMIN — LEVOTHYROXINE SODIUM 112 MCG: 0.11 TABLET ORAL at 05:25

## 2023-01-15 RX ADMIN — PANTOPRAZOLE SODIUM 40 MG: 40 TABLET, DELAYED RELEASE ORAL at 05:25

## 2023-01-15 ASSESSMENT — PAIN - FUNCTIONAL ASSESSMENT
PAIN_FUNCTIONAL_ASSESSMENT: PREVENTS OR INTERFERES SOME ACTIVE ACTIVITIES AND ADLS
PAIN_FUNCTIONAL_ASSESSMENT: PREVENTS OR INTERFERES SOME ACTIVE ACTIVITIES AND ADLS

## 2023-01-15 ASSESSMENT — PAIN SCALES - GENERAL
PAINLEVEL_OUTOF10: 0
PAINLEVEL_OUTOF10: 8
PAINLEVEL_OUTOF10: 3
PAINLEVEL_OUTOF10: 7
PAINLEVEL_OUTOF10: 4
PAINLEVEL_OUTOF10: 9

## 2023-01-15 ASSESSMENT — PAIN DESCRIPTION - FREQUENCY
FREQUENCY: CONTINUOUS
FREQUENCY: CONTINUOUS

## 2023-01-15 ASSESSMENT — PAIN DESCRIPTION - PAIN TYPE
TYPE: ACUTE PAIN
TYPE: ACUTE PAIN;SURGICAL PAIN

## 2023-01-15 ASSESSMENT — PAIN DESCRIPTION - DESCRIPTORS
DESCRIPTORS: SHOOTING;SHARP
DESCRIPTORS: SQUEEZING

## 2023-01-15 ASSESSMENT — PAIN DESCRIPTION - LOCATION
LOCATION: LEG
LOCATION: LEG
LOCATION: HIP

## 2023-01-15 ASSESSMENT — PAIN DESCRIPTION - ONSET
ONSET: ON-GOING
ONSET: ON-GOING

## 2023-01-15 ASSESSMENT — PAIN DESCRIPTION - ORIENTATION
ORIENTATION: RIGHT
ORIENTATION: LEFT
ORIENTATION: LEFT

## 2023-01-15 NOTE — CARE COORDINATION
SW Following, TREV spoke w/Pt's [facility] [phone] regarding DC plans. He asked that we refer the Pt to the [facility] ARU. An ARU consult is pending. TREV also discussed back up options if a SNF placement is more appropriate. Darius Proctor requested referrals for [facility] and [facility]. TREV submitted referrals to both.     Electronically signed by RAHEEM Castellanos, LSW on 1/15/2023 at 11:17 AM   [phone]

## 2023-01-15 NOTE — PROGRESS NOTES
Physical Therapy    Daily Treatment Note       Discharge Recommendations:  Yudy Michael scored a 10/24 on the AM-PAC short mobility form. Current research shows that an AM-PAC score of 17 or less is typically not associated with a discharge to the patient's home setting. Based on the patient's AM-PAC score and their current functional mobility deficits, it is recommended that the patient have 3-5 sessions per week of Physical Therapy at d/c to increase the patient's independence. Please see assessment section for further patient specific details. Assessment:  Pt with increased confusion, now in restraints to maintain IV lines for amiodarone drip. Pt cooperative with LE and UE exercises, and mobility in bed. Pt easily distracted with conversation, needing frequent redirection to task. Pt will need ongoing skilled PT at d/c to increase strength and maximize mobility before returning home. Will follow. Equipment Needs:  Defer    Chart Reviewed: Yes   Other position/activity restrictions: WBAT   Additional Pertinent Hx: Patient presents to ED post fall d/t loss of balance in her home. No acute findings on CT head and neck. Xray reveals intertrochanteric fracture of L femur and chronic interstitial lung disease. CT of chest/abdomen reveals pleural effusion bilaterally, stable compression fractures of thoracic spine, and hiatal hernia. PMH: OA, hyperlipidemia, breast cancer, hepatitis      Diagnosis: other fracture of left femur, initial encounter for closed fracture   Treatment Diagnosis: decreased functional mobility    Comment:  Noted events from last PT session. Pt with fall and rapid response for chest pain. RN ok to see for PT, but pt currently in restraints to keep IV line for amiodarone drip. Subjective:  Pt found supine in bed, sleeping. Pt pleasant and cooperative. \"Easy now. \" Noted increased confusion with conversation at times.     Pain:  Pt reports L LE pain, not rated, RN aware.      Objective:      Bed mobility  Rolling Right:  Max A (3x total with max verbal/tactile cues, reaching across body for rail)  Rolling Left:  Max A (3x total with max verbal/tactile cues, reaching across body for rail)    Exercises  Ankle Pumps x20 BLE  Hip Abduction x10 R LE indep, x10 L LE with Max A   Heel slides x10 R LE indep, x10 L LE with Max A   SLR x10 R LE with Min A, x10 L LE with Max A  Quad sets x15 BLE  Glut sets x15 BLE  Shoulder flexion x10 B UE  Elbow flex/ext x10 B UE  Wrist flex/ext x10 B  UE   squeezes x15 B UE      Patient Education  Role of PT. Pt verbalized partial understanding and would benefit from reinforcement of education. Safety Devices  Pt left with needs in reach, supine in bed with alarm in place and RN aware. Pt lying on R side with wedge to off load L hip. B soft wrist restraints reapplied at end of session. Camera in room. AM-PAC score  AM-PAC Inpatient Mobility Raw Score : 10  AM-PAC Inpatient T-Scale Score : 32.29  Mobility Inpatient CMS 0-100% Score: 76.75  Mobility Inpatient CMS G-Code Modifier : CL    Goals:  Goals not met this treatment, continue with current goals. Time Frame for Short Term Goals: discharge  Short Term Goal 1: supine to sit modA x1   Short Term Goal 2: sit to stand with TW modAx1   Short Term Goal 3: bed to chair transfer with RW modA x1  Short Term Goal 4: ambulate 15ft with RW mod A x1       Plan:  5-7x/week      Therapy Time    Individual  Concurrent  Group  Co-treatment    Time In  0757            Time Out  0835            Minutes  38              Timed Code Treatment Minutes:  38  Total Treatment Minutes:  38      If patient is discharged prior to next treatment, this note will serve as the discharge summary.     Kelley Petty, PT

## 2023-01-15 NOTE — PLAN OF CARE
Problem: Musculoskeletal - Adult  Goal: Return mobility to safest level of function  Outcome: Progressing  Goal: Maintain proper alignment of affected body part  Outcome: Progressing  Flowsheets (Taken 1/14/2023 2000)  Maintain proper alignment of affected body part: Support and protect limb and body alignment per provider's orders  Goal: Return ADL status to a safe level of function  Outcome: Progressing  Flowsheets (Taken 1/14/2023 2000)  Return ADL Status to a Safe Level of Function: Administer medication as ordered     Problem: Safety - Adult  Goal: Free from fall injury  1/15/2023 0026 by Melynda Carrel, RN  Outcome: Progressing    Problem: ABCDS Injury Assessment  Goal: Absence of physical injury  Outcome: Progressing     Problem: Pain  Goal: Verbalizes/displays adequate comfort level or baseline comfort level  1/15/2023 0026 by Melynda Carrel, RN  Outcome: Progressing    Problem: Skin/Tissue Integrity  Goal: Absence of new skin breakdown  Description: 1. Monitor for areas of redness and/or skin breakdown  2. Assess vascular access sites hourly  3. Every 4-6 hours minimum:  Change oxygen saturation probe site  4. Every 4-6 hours:  If on nasal continuous positive airway pressure, respiratory therapy assess nares and determine need for appliance change or resting period. 1/15/2023 0026 by Melynda Carrel, RN  Outcome: Progressing    Problem: Safety - Medical Restraint  Goal: Remains free of injury from restraints (Restraint for Interference with Medical Device)  Description: INTERVENTIONS:  1. Determine that other, less restrictive measures have been tried or would not be effective before applying the restraint  2. Evaluate the patient's condition at the time of restraint application  3. Inform patient/family regarding the reason for restraint  4.  Q2H: Monitor safety, psychosocial status, comfort, nutrition and hydration  Outcome: Progressing  Flowsheets (Taken 1/14/2023 2040)  Remains free of injury from restraints (restraint for interference with medical device):   Evaluate the patient's condition at the time of restraint application   Inform patient/family regarding the reason for restraint     Problem: Safety - Adult  Goal: Free from fall injury  1/15/2023 0026 by Anaya Alatorre RN  Outcome: Progressing    Problem: Pain  Goal: Verbalizes/displays adequate comfort level or baseline comfort level  1/15/2023 0026 by Anaya Alatorre RN  Outcome: Progressing    Problem: Safety - Medical Restraint  Goal: Remains free of injury from restraints (Restraint for Interference with Medical Device)  Description: INTERVENTIONS:  1. Determine that other, less restrictive measures have been tried or would not be effective before applying the restraint  2. Evaluate the patient's condition at the time of restraint application  3. Inform patient/family regarding the reason for restraint  4.  Q2H: Monitor safety, psychosocial status, comfort, nutrition and hydration  Outcome: Progressing  Flowsheets (Taken 1/14/2023 2040)  Remains free of injury from restraints (restraint for interference with medical device):   Evaluate the patient's condition at the time of restraint application   Inform patient/family regarding the reason for restraint

## 2023-01-15 NOTE — PLAN OF CARE
Problem: Safety - Adult  Goal: Free from fall injury  Outcome: Not Progressing     Problem: Pain  Goal: Verbalizes/displays adequate comfort level or baseline comfort level  Outcome: Not Progressing     Problem: Skin/Tissue Integrity  Goal: Absence of new skin breakdown  Description: 1. Monitor for areas of redness and/or skin breakdown  2. Assess vascular access sites hourly  3. Every 4-6 hours minimum:  Change oxygen saturation probe site  4. Every 4-6 hours:  If on nasal continuous positive airway pressure, respiratory therapy assess nares and determine need for appliance change or resting period.   Outcome: Progressing     Problem: Safety - Adult  Goal: Free from fall injury  Outcome: Not Progressing     Problem: Pain  Goal: Verbalizes/displays adequate comfort level or baseline comfort level  Outcome: Not Progressing

## 2023-01-15 NOTE — PROGRESS NOTES
Hospitalist Progress Note      PCP: Sonali Pichardo    Date of Admission: 1/12/2023    Chief Complaint: worsening lethargy      Subjective:     New onset atrial fibrillation with RVR and became hypotensive. Patient is confused. She complains of left thigh and leg pain.       Medications:  Reviewed    Infusion Medications    amiodarone 1 mg/min (01/14/23 2132)    Followed by    amiodarone      lactated ringers Stopped (01/14/23 5352)    sodium chloride 25 mL/hr at 01/13/23 0932     Scheduled Medications    OLANZapine  5 mg Oral Nightly    lidocaine  2 patch TransDERmal Daily    gabapentin  100 mg Oral TID    sodium chloride flush  5-40 mL IntraVENous 2 times per day    levothyroxine  112 mcg Oral QAM AC    atorvastatin  10 mg Oral Nightly    pantoprazole  40 mg Oral QAM AC    midazolam  0.5 mg IntraVENous Once    sodium chloride flush  5-40 mL IntraVENous 2 times per day     PRN Meds: HYDROmorphone, ketorolac, perflutren lipid microspheres, sodium chloride flush, ondansetron **OR** ondansetron, polyethylene glycol, acetaminophen **OR** acetaminophen, clonazePAM, oxyCODONE **OR** oxyCODONE, simethicone, sodium chloride flush, sodium chloride      Intake/Output Summary (Last 24 hours) at 1/15/2023 0229  Last data filed at 1/14/2023 0409  Gross per 24 hour   Intake --   Output 150 ml   Net -150 ml         Physical Exam Performed:    BP (!) 95/57   Pulse 77   Temp 97.7 °F (36.5 °C) (Axillary)   Resp 21   Ht 5' 1\" (1.549 m)   Wt 138 lb 10.7 oz (62.9 kg)   SpO2 99%   BMI 26.20 kg/m²     GEN alert, in distressed about her pain  HEENT normocephalic, anicteric sclera, EOMI, mucosa moist, no stridor  NECK supple, trachea midline  RESP on RA, in no distress, clear to auscultation  CARDS RRR, S1, S2, systolic murmurs over RUSB, LUSB, left upper extremity edema, left thigh edema, radial pulse 2+, DP pulse 2+  ABD distended, +BS, soft nontender  MSK left hip tenderness, no cyanosis, no clubbing  SKIN warm, dry  NEURO alert, confused today, no facial asymmetry, no dysarthria, moving spontaneously  PSYCH normal mood      Labs:   Recent Labs     01/12/23  0912 01/13/23  2354   WBC 6.5 8.8   HGB 15.0 9.0*   HCT 44.3 26.5*    136       Recent Labs     01/12/23  1449 01/13/23  0616 01/13/23  2354    137 139   K 4.1 4.3 3.7    105 106   CO2 22 22 22   BUN 10 12 13   CREATININE 0.8 0.8 0.9   CALCIUM 9.7 8.8 8.8   PHOS 4.0 5.1* 3.0       Recent Labs     01/12/23  1449 01/13/23  0616   AST 19 16   ALT 11 8*   BILITOT 1.0 0.5   ALKPHOS 55 37*       Recent Labs     01/12/23  0912   INR 1.10       Recent Labs     01/14/23  1301 01/14/23  1925 01/15/23  0036   TROPONINI 0.13* 0.15* 0.18*         Urinalysis:      Lab Results   Component Value Date/Time    NITRU Negative 01/13/2023 03:16 AM    WBCUA 0-2 01/13/2023 03:16 AM    RBCUA  01/13/2023 03:16 AM    BLOODU MODERATE 01/13/2023 03:16 AM    SPECGRAV 1.025 01/13/2023 03:16 AM    GLUCOSEU Negative 01/13/2023 03:16 AM       Radiology:  CT HEAD WO CONTRAST   Final Result      Chronic ischemic changes of the brain and atrophy with no midline shift or hydrocephalus or definitive hemorrhage. Extensive artifact noted likely from patient motion            XR TIBIA FIBULA LEFT (2 VIEWS)   Final Result      Normal radiographs of the left tibia and fibula. Knee compartment narrowing appreciated. Left ankle 3 view:      HISTORY: Trauma:      FINDINGS:      AP lateral oblique views demonstrate normal alignment.     There is no acute fracture or dislocation      IMPRESSION:      No acute fracture       LEFT HIP 2 VIEW , AP view pelvis      HISTORY: Laural Horn with hip pain, previous left hip pinning      PROCEDURE: AP view the pelvis and lateral view of left hip were obtained      COMPARISON: Intraoperative studies from January 12, 2023      FINDINGS:      There is normal alignment of the right hip with 4 screws noted traversing the trochanter femoral neck and head from previous fracture fixation without significant displacement, unchanged. Left trochanteric hip fixation is intact with transfixation screws through the trochanter femoral neck and long intramedullary amaris remaining in stable position compared to recent study. No discrete acute fracture or dislocation. No expansile cystic or destructive change identified      AP view of the pelvis demonstrates no acute bony defect. Diffuse demineralization noted. Degenerative changes lower lumbar spine. The sacroiliac margins appear normal.    There is some minimal sclerosis along the acetabulum      IMPRESSION:      No acute deformity involving the right or left hip. Previous pinning of the right hip is unchanged. Previous fracture fixation of the left trochanteric and hip is intact      No acute bony defect in the pelvis. Diffuse demineralization            XR SHOULDER LEFT (MIN 2 VIEWS)   Final Result      Chronic advanced arthropathy of the left shoulder with loose bodies and subarticular erosions affecting the humeral head and advanced glenohumeral arthritic change and remodeling         XR ANKLE LEFT (2 VIEWS)   Final Result      Normal radiographs of the left tibia and fibula. Knee compartment narrowing appreciated. Left ankle 3 view:      HISTORY: Trauma:      FINDINGS:      AP lateral oblique views demonstrate normal alignment. There is no acute fracture or dislocation      IMPRESSION:      No acute fracture       LEFT HIP 2 VIEW , AP view pelvis      HISTORY: Neil Pauling with hip pain, previous left hip pinning      PROCEDURE: AP view the pelvis and lateral view of left hip were obtained      COMPARISON: Intraoperative studies from January 12, 2023      FINDINGS:      There is normal alignment of the right hip with 4 screws noted traversing the trochanter femoral neck and head from previous fracture fixation without significant displacement, unchanged.       Left trochanteric hip fixation is intact with transfixation screws through the trochanter femoral neck and long intramedullary amaris remaining in stable position compared to recent study. No discrete acute fracture or dislocation. No expansile cystic or destructive change identified      AP view of the pelvis demonstrates no acute bony defect. Diffuse demineralization noted. Degenerative changes lower lumbar spine. The sacroiliac margins appear normal.    There is some minimal sclerosis along the acetabulum      IMPRESSION:      No acute deformity involving the right or left hip. Previous pinning of the right hip is unchanged. Previous fracture fixation of the left trochanteric and hip is intact      No acute bony defect in the pelvis. Diffuse demineralization            XR KNEE RIGHT (1-2 VIEWS)   Final Result      1. Right knee: Advanced arthritic change as described above. No sign of any fracture or acute deformity. 2. Left knee: Moderate degenerative change particularly involving the lateral compartment with some prepatellar soft tissue swelling. No acute defect      XR HIP 2-3 VW W PELVIS LEFT   Final Result      Normal radiographs of the left tibia and fibula. Knee compartment narrowing appreciated. Left ankle 3 view:      HISTORY: Trauma:      FINDINGS:      AP lateral oblique views demonstrate normal alignment. There is no acute fracture or dislocation      IMPRESSION:      No acute fracture       LEFT HIP 2 VIEW , AP view pelvis      HISTORY: Aviva Santo with hip pain, previous left hip pinning      PROCEDURE: AP view the pelvis and lateral view of left hip were obtained      COMPARISON: Intraoperative studies from January 12, 2023      FINDINGS:      There is normal alignment of the right hip with 4 screws noted traversing the trochanter femoral neck and head from previous fracture fixation without significant displacement, unchanged.       Left trochanteric hip fixation is intact with transfixation screws through the trochanter femoral neck and long intramedullary amaris remaining in stable position compared to recent study. No discrete acute fracture or dislocation. No expansile cystic or destructive change identified      AP view of the pelvis demonstrates no acute bony defect. Diffuse demineralization noted. Degenerative changes lower lumbar spine. The sacroiliac margins appear normal.    There is some minimal sclerosis along the acetabulum      IMPRESSION:      No acute deformity involving the right or left hip. Previous pinning of the right hip is unchanged. Previous fracture fixation of the left trochanteric and hip is intact      No acute bony defect in the pelvis. Diffuse demineralization            XR KNEE LEFT (1-2 VIEWS)   Final Result      1. Right knee: Advanced arthritic change as described above. No sign of any fracture or acute deformity. 2. Left knee: Moderate degenerative change particularly involving the lateral compartment with some prepatellar soft tissue swelling. No acute defect      XR HIP LEFT (2-3 VIEWS)   Final Result      Intraoperative fluoroscopy for open reduction internal fixation of the left hip. Refer to the operative note for details. FLUORO FOR SURGICAL PROCEDURES   Final Result      Intraoperative fluoroscopy for open reduction internal fixation of the left hip. Refer to the operative note for details. CT CHEST ABDOMEN PELVIS W CONTRAST Additional Contrast? None   Final Result      CHEST:   1.  Small right pleural effusion and trace left pleural effusion. Mild bibasilar atelectasis. 2.  Stable remote compression deformities at T12 and L1 compared to prior MRI from 2015. There is also new compression deformity at T11 compared to the study, but this is age indeterminant and may also be remote. Recommend correlation with point    tenderness in this level. 3.  Motion limited exam without definite evidence of acute traumatic intrathoracic abnormality.    4.  Moderate hiatal hernia. ABDOMEN AND PELVIS:   1.  Mildly displaced comminuted intratrochanteric fracture of the left femur. 2.  Surrounding stranding/hematoma in the left thigh as detailed above. 3.  No additional acute traumatic abnormality identified in the abdomen and pelvis per         CT CERVICAL SPINE WO CONTRAST   Final Result   1. No acute traumatic abnormality of the cervical spine. 2. Stable multilevel degenerative disc disease and facet arthropathy. 3. Stable slight rotatory subluxation of C1 on C2 on the right side. CT HEAD WO CONTRAST   Final Result      1. Stable exam with no acute intracranial abnormality. XR FEMUR LEFT (MIN 2 VIEWS)   Final Result   Addendum (preliminary) 1 of 1   [ADDENDUM #1   Addendum: Initial report by Dr. Kadeem Erazo. Addendum by Dr. David Gaona. Frontal view of the pelvis and frontal and frog-leg lateral views of the    left femur were obtained. There is a nondisplaced intertrochanteric    fracture of the left femur. No dislocation. Orthopedic hardware of the    proximal right femur is noted. No    additional fracture deformity. Final   Nondisplaced intertrochanteric fracture on the left. IMPRESSION:      No pneumothorax. Diffusely prominent interstitial markings presumably representing chronic interstitial lung disease. No prior for comparison. Normal cardiomediastinal silhouette. Presumed old fracture deformity of the proximal left humerus. If there is left shoulder pain then dedicated radiographs are indicated. XR PELVIS (1-2 VIEWS)   Final Result   Addendum (preliminary) 1 of 1   [ ADDENDUM #1 *   Addendum: Initial report by Dr. Kadeem Erazo. Addendum by Dr. David Gaona. Frontal view of the pelvis and frontal and frog-leg lateral views of the    left femur were obtained. There is a nondisplaced intertrochanteric    fracture of the left femur. No dislocation. Orthopedic hardware of the    proximal right femur is noted.  No    additional fracture deformity. Final   Nondisplaced intertrochanteric fracture on the left. IMPRESSION:      No pneumothorax. Diffusely prominent interstitial markings presumably representing chronic interstitial lung disease. No prior for comparison. Normal cardiomediastinal silhouette. Presumed old fracture deformity of the proximal left humerus. If there is left shoulder pain then dedicated radiographs are indicated. XR CHEST PORTABLE   Final Result   Addendum (preliminary) 1 of 1   [ADDENDUM #1    Addendum: Initial report by Dr. Clarisa Nelson. Addendum by Dr. Wally Woo. Frontal view of the pelvis and frontal and frog-leg lateral views of the    left femur were obtained. There is a nondisplaced intertrochanteric    fracture of the left femur. No dislocation. Orthopedic hardware of the    proximal right femur is noted. No    additional fracture deformity. Final   Nondisplaced intertrochanteric fracture on the left. IMPRESSION:      No pneumothorax. Diffusely prominent interstitial markings presumably representing chronic interstitial lung disease. No prior for comparison. Normal cardiomediastinal silhouette. Presumed old fracture deformity of the proximal left humerus. If there is left shoulder pain then dedicated radiographs are indicated. Assessment/Plan: This is a 79 yo Female, with history of bilateral breast cancer, s/p left mastectomy 1975, right lumpectomy and radiation in 1999, DVT 2009 while on Evista, hypothyroidism, esophageal spasm, schatzki's ring, EGD with dilation 1/2011, GERD, hepatitis B, OA, spinal stenosis, Rt Hip Fracture s/p surgery 2/2010, Thoracic and Lumbar compression fractures, who presents for evaluation after fall. She was found to have sustained mildly displaced comminuted intratrochanteric fracture of the left femur. Patient is alert, oriented, and coherent.   Daughter in-law, Elena Haque, is present and assists with clinical history. This morning patient walked from her bedroom to the bathroom. When she reached the bathroom, she felt lightheaded. States that she must have turned around, lost her balance, and fell onto her left side. After falling she was in significant pain and was unable to rise on her own. Patient has a  who comes every morning to walk her dog at ~ 730 AM.    Knowing that her  was coming, patient screamed out for help. Reportedly she was found likely on her left side on the bathroom floor. Her  called EMS and patient was taken to [facility] for evaluation. Patient has been in her usual state of health. She walks about 1 mile daily without assistance. She used to walk longer distances. Patient denies recent fevers, chills, cough, shortness of breath, chest pain, palpitations at rest and on exertion. She denies history of nausea, vomiting, abdominal pain, dysuria, diarrhea. She eats three meals daily. States that she does not eat much during lunchtime. Daughter in-law, Yuliet Julien said patient had another fall in February 2022 while walking her dog; she was found on the side walk. After, she has been working with PT/OT to work on her strength and stability. She may have fallen a couple of times after. Active Hospital Problems    Diagnosis Date Noted    Other fracture of left femur, initial encounter for closed fracture [facility]) [S72.8X2A] 01/12/2023     Priority: Medium     Encephalopathy, Likely Post-op Delirium  - became confused on 1/14 and pulled out IV's. - had to place in soft restraints for safety. - in the setting of post-op and elderly age. Adequate hydration and pain control.  - mental clear and oriented x 3 on 1/15. New Onset Atrial Fibrillation with RVR  - RVR with HR to 160's and became hypotensive to SBP 70's in the setting of severe AS  - Received 1L NS bolus.   - Received amiodarone bolus 150 mg, then started infusion of 1 mg/min for 6 hours, followed by 0.5 mg/min. -- she converted back NSR after amiodarone bolus and start of 1mg/min infusion. -- consider anticoagulation in the setting valvular disease, however, she is 79 yo. The onset of atrial fibrillation < 48 hours. - Monitor and discuss with family. LV with Grade II Diastolic Dysfunction  Severe Aortic Stenosis, Symptomatic  Mild to Moderate Mitral Regurgitation  Mild to Moderate Mitral Stenosis  - ECHO 1/12/23 showed normal LV cavity size, wall thickness, LVEF 60-65%, no regional wall motion abnormalities. Grade II diastolic dysfunction. Mitral valve leaflets are thickened/calcified. MAC. Mild to moderate MR and MS. Severe aortic stenosis with peak velocity of 5.3 m/s, mean pressure gradient of 66 mmHg. Mild TR. PSAP 48 mmHg. - Reports lightheadedness on rising. When she fell on 1/12 -- when she walked to the bathroom she was not initially lightheaded; after reaching the bathroom she felt lightheaded. She denies shortness of breath, chest pain, palpitations on exertion. - cardiology consulted for consideration of TAVR. Patient extremely functional; walks 1 mile daily without assistive device. Mildly Displaced Comminuted Intra-trochanteric Fracture of Left Femur  - CT A/P with contrast 1/12/23 showed mildly displaced comminuted intratrochanteric fracture of the left femur. Surrounding stranding/hematoma in the left thigh as detailed above. - s/p ORIF of left femoral fracture with CMN on 1/12/23  - monitor for post-op complications. - Pain control -- sensitive to narcotics. - Bowel regimen. Recurrent Falls  Lightheadedness  - walks 1 mile daily without assistive device. - Had a fall in February 2022 while walking her dog.  - she may have fallen a couple more times after, then today 1/12. -- prior to falling today -- she felt lightheaded, turned and lost her balance resulting in her fall.     -- adm labs showed elevated lactic acid and ECHO showed severe aortic stenosis. - CT of Head wo contrast 1/12 showed no acute intracranial process. - CT Cervical Spine wo contrast 1/12 showed no acute traumatic abnormality of the cervical spine. Stable multilevel degenerative disc disease and facet arthropathy. Stable slight rotatory subluxation of C1 on C2 on the right side.  - B12, folate, vitamin D5OH. Elevated Lactic Acid  - adm lactic acid 3.0.  - Administered 2 L LR, then started LR infusion 75 cc/hr. - lactic acid normalized after receiving IVF's. Small Right Pleural Effusion  Trace Left Pleural Effusion  - in the setting of valvular disease and LV with grade II diastolic dysfunction,    Urinary Retention  - bladder scan showed > 500 cc urine.    - marrero placed on 1/12. Hypothyroidism  - Continue home levothyroxine 112 mcg daily.  - TSH, free T4 (pharmacy verified that she has not filled her prescription in several months.)     Remote Compression Deformities at T12, L1  New Compression Deformity at T11  OA  - vitamin D25OH level. History of Rt Hip Fracture   - s/p surgery 2/2010     History of bilateral breast cancer  - s/p left mastectomy 1975  - s/p right lumpectomy and radiation in 1999     DVT 2009   - thought possible related to Evista  - DVT ppx. Moderate Hiatal Hernia  GERD  History of Esophageal spasm  History of Schatzki's ring  - S/p EGD with dilation 1/2011  - Continue home omeprazole 40 mg daily. History of Hepatitis B       DVT Prophylaxis:   Diet: ADULT ORAL NUTRITION SUPPLEMENT; Breakfast, Lunch, Dinner; Standard High Calorie/High Protein Oral Supplement  ADULT DIET;  Regular; Low Fat/Low Chol/High Fiber/2 gm Na  Code Status: Full Code    PT/OT Eval Status: PT/OT    Dispo -     Aurea Romero MD

## 2023-01-15 NOTE — PROGRESS NOTES
Orthopaedic Surgery Progress Note:    Margarita Machuca is a 80 y.o. female now s/p Left femur IMN 1/12/2023     S:   Pt seen and examined  Mental status improving, AAOx3 today   L hip pain improving           O:   Vitals:    01/15/23 0723   BP: (!) 91/51   Pulse: 74   Resp: 19   Temp: 97.3 °F (36.3 °C)   SpO2: 95%       GEN: Alert, resting in bed, NAD  HEENT: NCAT  CV: normotensive  RESP: non-labored breathing    LLE:   Dressing CDI, no hematoma or drainage   NO TTP knee, tibia, ankle  Intact TA/EHL/GS  SILT L2-S1  Cap Refill< 2 sec             Labs:    Lab Results   Component Value Date    WBC 8.8 01/13/2023    HGB 9.0 (L) 01/13/2023    HCT 26.5 (L) 01/13/2023    MCV 93.2 01/13/2023     01/13/2023       Lab Results   Component Value Date     01/13/2023    K 3.7 01/13/2023    CO2 22 01/13/2023     01/13/2023    BUN 13 01/13/2023    CREATININE 0.9 01/13/2023           Intake/Output Summary (Last 24 hours) at 1/15/2023 1036  Last data filed at 1/15/2023 0600  Gross per 24 hour   Intake 772.42 ml   Output 850 ml   Net -77.58 ml           A: Margarita Machuca is a 80 y.o. female now s/p Left femur IMN 1/12/2023       P:         Activity:  PT/OT   ROM: AT  Antibiotics: Ancef post-op x 2 doses   Weight Bearing Status: WBAT LLE  Pain: multimodal   Diet: ADAT  marrero: per urology   DVT PPx: OK for chemical DVT PPX   Imaging: none  Follow-up: 2 weeks  Dispo: pending pain control, PT/OT, per primary         Job Dhruv BAEZ  Orthopaedic Spine Surgery  [facility]  O: [phone]  C: [phone]  E: [email or web address]

## 2023-01-15 NOTE — PROGRESS NOTES
Patient's family called and notified on non-violent restraint and updated on patient's situation. Patient resting quietly in bed. Bed at lowest position and locked. Bed alarm on for safety. Will continue to monitor.

## 2023-01-15 NOTE — PROGRESS NOTES
At the beginning of the shift patient did not have an IV access, patient was screaming because she was in pain. Patient was alert to self. Patient restless and agitated. Attending Physician started patient on Zyprexa nightly, Gabapentin, Lidocaine patch and Toradol ( see MAR). IV access obtained, Restraints ordered to prevent patient from pulling IV access. Amiodarone drip started per order. Patient resting quietly. Will continue to monitor.

## 2023-01-16 PROBLEM — S72.002A CLOSED FRACTURE OF LEFT HIP (HCC): Status: ACTIVE | Noted: 2023-01-16

## 2023-01-16 LAB
A/G RATIO: 1.2 (ref 1.1–2.2)
ALBUMIN SERPL-MCNC: 2.6 G/DL (ref 3.4–5)
ALP BLD-CCNC: 47 U/L (ref 40–129)
ALT SERPL-CCNC: 15 U/L (ref 10–40)
ANION GAP SERPL CALCULATED.3IONS-SCNC: 10 MMOL/L (ref 3–16)
AST SERPL-CCNC: 29 U/L (ref 15–37)
BASOPHILS ABSOLUTE: 0 K/UL (ref 0–0.2)
BASOPHILS RELATIVE PERCENT: 0.4 %
BILIRUB SERPL-MCNC: 0.6 MG/DL (ref 0–1)
BUN BLDV-MCNC: 16 MG/DL (ref 7–20)
CALCIUM SERPL-MCNC: 8.3 MG/DL (ref 8.3–10.6)
CHLORIDE BLD-SCNC: 103 MMOL/L (ref 99–110)
CO2: 20 MMOL/L (ref 21–32)
CREAT SERPL-MCNC: 0.8 MG/DL (ref 0.6–1.2)
EOSINOPHILS ABSOLUTE: 0 K/UL (ref 0–0.6)
EOSINOPHILS RELATIVE PERCENT: 0 %
FOLATE: >20 NG/ML (ref 4.78–24.2)
GFR SERPL CREATININE-BSD FRML MDRD: >60 ML/MIN/{1.73_M2}
GLUCOSE BLD-MCNC: 101 MG/DL (ref 70–99)
HCT VFR BLD CALC: 23.2 % (ref 36–48)
HEMOGLOBIN: 7.6 G/DL (ref 12–16)
LYMPHOCYTES ABSOLUTE: 0.5 K/UL (ref 1–5.1)
LYMPHOCYTES RELATIVE PERCENT: 10.8 %
MAGNESIUM: 1.6 MG/DL (ref 1.8–2.4)
MCH RBC QN AUTO: 31.2 PG (ref 26–34)
MCHC RBC AUTO-ENTMCNC: 32.7 G/DL (ref 31–36)
MCV RBC AUTO: 95.3 FL (ref 80–100)
MONOCYTES ABSOLUTE: 0.5 K/UL (ref 0–1.3)
MONOCYTES RELATIVE PERCENT: 11.7 %
NEUTROPHILS ABSOLUTE: 3.3 K/UL (ref 1.7–7.7)
NEUTROPHILS RELATIVE PERCENT: 77.1 %
PDW BLD-RTO: 14 % (ref 12.4–15.4)
PHOSPHORUS: 3.9 MG/DL (ref 2.5–4.9)
PLATELET # BLD: 111 K/UL (ref 135–450)
PMV BLD AUTO: 8.7 FL (ref 5–10.5)
POTASSIUM SERPL-SCNC: 4.2 MMOL/L (ref 3.5–5.1)
RBC # BLD: 2.44 M/UL (ref 4–5.2)
SODIUM BLD-SCNC: 133 MMOL/L (ref 136–145)
TOTAL PROTEIN: 4.7 G/DL (ref 6.4–8.2)
TROPONIN: 0.17 NG/ML
VITAMIN B-12: 636 PG/ML (ref 211–911)
VITAMIN D 25-HYDROXY: 22.8 NG/ML
WBC # BLD: 4.2 K/UL (ref 4–11)

## 2023-01-16 PROCEDURE — 36415 COLL VENOUS BLD VENIPUNCTURE: CPT

## 2023-01-16 PROCEDURE — 99222 1ST HOSP IP/OBS MODERATE 55: CPT | Performed by: PHYSICAL MEDICINE & REHABILITATION

## 2023-01-16 PROCEDURE — 97530 THERAPEUTIC ACTIVITIES: CPT

## 2023-01-16 PROCEDURE — 83735 ASSAY OF MAGNESIUM: CPT

## 2023-01-16 PROCEDURE — 85025 COMPLETE CBC W/AUTO DIFF WBC: CPT

## 2023-01-16 PROCEDURE — 99223 1ST HOSP IP/OBS HIGH 75: CPT | Performed by: INTERNAL MEDICINE

## 2023-01-16 PROCEDURE — 2580000003 HC RX 258: Performed by: STUDENT IN AN ORGANIZED HEALTH CARE EDUCATION/TRAINING PROGRAM

## 2023-01-16 PROCEDURE — 84100 ASSAY OF PHOSPHORUS: CPT

## 2023-01-16 PROCEDURE — 84484 ASSAY OF TROPONIN QUANT: CPT

## 2023-01-16 PROCEDURE — 6370000000 HC RX 637 (ALT 250 FOR IP): Performed by: INTERNAL MEDICINE

## 2023-01-16 PROCEDURE — 6360000002 HC RX W HCPCS: Performed by: INTERNAL MEDICINE

## 2023-01-16 PROCEDURE — 80053 COMPREHEN METABOLIC PANEL: CPT

## 2023-01-16 PROCEDURE — 2580000003 HC RX 258: Performed by: INTERNAL MEDICINE

## 2023-01-16 PROCEDURE — 1200000000 HC SEMI PRIVATE

## 2023-01-16 PROCEDURE — 6370000000 HC RX 637 (ALT 250 FOR IP): Performed by: STUDENT IN AN ORGANIZED HEALTH CARE EDUCATION/TRAINING PROGRAM

## 2023-01-16 RX ORDER — 0.9 % SODIUM CHLORIDE 0.9 %
500 INTRAVENOUS SOLUTION INTRAVENOUS ONCE
Status: COMPLETED | OUTPATIENT
Start: 2023-01-16 | End: 2023-01-16

## 2023-01-16 RX ORDER — AMIODARONE HYDROCHLORIDE 200 MG/1
200 TABLET ORAL DAILY
Status: DISCONTINUED | OUTPATIENT
Start: 2023-01-16 | End: 2023-01-23 | Stop reason: HOSPADM

## 2023-01-16 RX ORDER — METOPROLOL SUCCINATE 25 MG/1
12.5 TABLET, EXTENDED RELEASE ORAL DAILY
Status: DISCONTINUED | OUTPATIENT
Start: 2023-01-16 | End: 2023-01-23 | Stop reason: HOSPADM

## 2023-01-16 RX ADMIN — ATORVASTATIN CALCIUM 10 MG: 10 TABLET, FILM COATED ORAL at 21:22

## 2023-01-16 RX ADMIN — LEVOTHYROXINE SODIUM 112 MCG: 0.11 TABLET ORAL at 05:28

## 2023-01-16 RX ADMIN — SODIUM CHLORIDE, PRESERVATIVE FREE 10 ML: 5 INJECTION INTRAVENOUS at 11:25

## 2023-01-16 RX ADMIN — SODIUM CHLORIDE, PRESERVATIVE FREE 10 ML: 5 INJECTION INTRAVENOUS at 11:24

## 2023-01-16 RX ADMIN — HEPARIN SODIUM 5000 UNITS: 5000 INJECTION INTRAVENOUS; SUBCUTANEOUS at 21:22

## 2023-01-16 RX ADMIN — GABAPENTIN 100 MG: 100 CAPSULE ORAL at 09:35

## 2023-01-16 RX ADMIN — PANTOPRAZOLE SODIUM 40 MG: 40 TABLET, DELAYED RELEASE ORAL at 05:29

## 2023-01-16 RX ADMIN — SODIUM CHLORIDE, POTASSIUM CHLORIDE, SODIUM LACTATE AND CALCIUM CHLORIDE: 600; 310; 30; 20 INJECTION, SOLUTION INTRAVENOUS at 04:24

## 2023-01-16 RX ADMIN — METOPROLOL SUCCINATE 12.5 MG: 25 TABLET, EXTENDED RELEASE ORAL at 19:09

## 2023-01-16 RX ADMIN — OXYCODONE 5 MG: 5 TABLET ORAL at 14:15

## 2023-01-16 RX ADMIN — GABAPENTIN 100 MG: 100 CAPSULE ORAL at 21:22

## 2023-01-16 RX ADMIN — SODIUM CHLORIDE, PRESERVATIVE FREE 10 ML: 5 INJECTION INTRAVENOUS at 21:23

## 2023-01-16 RX ADMIN — OLANZAPINE 5 MG: 5 TABLET, FILM COATED ORAL at 21:22

## 2023-01-16 RX ADMIN — AMIODARONE HYDROCHLORIDE 200 MG: 200 TABLET ORAL at 19:09

## 2023-01-16 RX ADMIN — HEPARIN SODIUM 5000 UNITS: 5000 INJECTION INTRAVENOUS; SUBCUTANEOUS at 14:16

## 2023-01-16 RX ADMIN — SODIUM CHLORIDE 500 ML: 9 INJECTION, SOLUTION INTRAVENOUS at 19:10

## 2023-01-16 RX ADMIN — HEPARIN SODIUM 5000 UNITS: 5000 INJECTION INTRAVENOUS; SUBCUTANEOUS at 05:28

## 2023-01-16 RX ADMIN — KETOROLAC TROMETHAMINE 15 MG: 15 INJECTION, SOLUTION INTRAMUSCULAR; INTRAVENOUS at 18:46

## 2023-01-16 RX ADMIN — OXYCODONE HYDROCHLORIDE 10 MG: 5 TABLET ORAL at 05:29

## 2023-01-16 RX ADMIN — CLONAZEPAM 1 MG: 1 TABLET ORAL at 21:22

## 2023-01-16 RX ADMIN — GABAPENTIN 100 MG: 100 CAPSULE ORAL at 14:15

## 2023-01-16 RX ADMIN — KETOROLAC TROMETHAMINE 15 MG: 15 INJECTION, SOLUTION INTRAMUSCULAR; INTRAVENOUS at 11:31

## 2023-01-16 RX ADMIN — OXYCODONE 5 MG: 5 TABLET ORAL at 21:22

## 2023-01-16 ASSESSMENT — PAIN DESCRIPTION - PAIN TYPE
TYPE: ACUTE PAIN
TYPE: ACUTE PAIN;SURGICAL PAIN

## 2023-01-16 ASSESSMENT — PAIN DESCRIPTION - LOCATION
LOCATION: HIP
LOCATION: LEG;HIP
LOCATION: HIP;LEG
LOCATION: LEG
LOCATION: LEG

## 2023-01-16 ASSESSMENT — PAIN DESCRIPTION - FREQUENCY
FREQUENCY: INTERMITTENT
FREQUENCY: INTERMITTENT
FREQUENCY: CONTINUOUS

## 2023-01-16 ASSESSMENT — PAIN DESCRIPTION - ORIENTATION
ORIENTATION: LEFT
ORIENTATION: RIGHT

## 2023-01-16 ASSESSMENT — PAIN SCALES - GENERAL
PAINLEVEL_OUTOF10: 6
PAINLEVEL_OUTOF10: 8
PAINLEVEL_OUTOF10: 5
PAINLEVEL_OUTOF10: 7
PAINLEVEL_OUTOF10: 7

## 2023-01-16 ASSESSMENT — PAIN DESCRIPTION - DESCRIPTORS
DESCRIPTORS: ACHING
DESCRIPTORS: DISCOMFORT;SQUEEZING
DESCRIPTORS: SHOOTING;SHARP

## 2023-01-16 ASSESSMENT — PAIN DESCRIPTION - ONSET
ONSET: ON-GOING
ONSET: PROGRESSIVE
ONSET: ON-GOING

## 2023-01-16 NOTE — CARE COORDINATION
Pt is from home alone. Pt's son would like her to go to ARU but if not appropriate he would like pt to go to [facility] or [facility]. [facility] is eval pt for admission, pt needs to become more medically stable. Awaiting official eval to be completed. SW following.   Electronically signed by RAHEEM Houston, CRISTÓBALW on 1/16/2023 at 11:57 AM  [phone]

## 2023-01-16 NOTE — CONSULTS
Cardiology Consultation                                                                    Pt Name: Darling Lau  Age: 80 y.o. Sex: female  : 1930  Location: 6310/6310-01    Referring Physician: Sarah Barrett MD  Primary cardiologist: hiwot Riggs      Reason for Consult:     Reason for Consultation/Chief Complaint: PAFRVR, severe AS, elevated troponin, chest pain    HPI:      Darling Lau is a 80 y.o. female with a past medical history of status post left mastectomy , right lumpectomy and radiation , DVT while on at this time, esophageal spasm due to Schatzki's ring status post dilation. Patient presented to the emergency room on  after she sustained a fall. That morning patient walked from her bedroom to the bathroom. When she reached the bathroom, she felt lightheaded. States that she must have turned around, lost her balance, and fell onto her left side. After falling she was in significant pain and was unable to rise on her own. At the ED, she was found to have a left femoral fracture. She had a nail placed on 2022, there were no complications perioperatively. On 2022 response was called at 6:30 AM because of complaints of severe chest pain. She was found to be in A. fib with RVR, rate 143 bpm with ischemic changes on ECG (of note patient had an ECG on 2022 which was normal sinus rhythm, normal ECG). He was started on amiodarone drip and converted into normal sinus rhythm; amiodarone drip was stopped 1/15/2023. Troponin peak at 0.19 and trending down. Echo 2023: Normal LV, LVEF 74%, grade 2 diastolic dysfunction, mild to moderate MS and MR, severe AS (peak velocity 5.3 m/s, mean pressure gradient 66 mmHg). Cardiology was consulted today for further evaluation. Patient reports no previous cardiac diagnosis. She lives independently and likes to take her dog out for a walk frequently. She denies any exertional or congestive symptoms.  Tele shows NSR.  He is low normal.  Albumin 2.6, hemoglobin 7.6 down from 9. Histories     Past Medical History:   has a past medical history of Cancer ([address] Hepatitis, Hyperlipidemia, and Osteoarthritis. Surgical History:   has a past surgical history that includes Mastectomy; Breast lumpectomy;  section; and hip surgery (Left, 2023). Social History:   reports that she has quit smoking. She has never used smokeless tobacco. She reports current alcohol use of about 1.0 standard drink per week. She reports that she does not use drugs. Family History:  No evidence for sudden cardiac death or premature CAD      Medications:       Home Medications  Were reviewed and are listed in nursing record. and/or listed below  Prior to Admission medications    Medication Sig Start Date End Date Taking? Authorizing Provider   QUEtiapine (SEROQUEL) 25 MG tablet Take 25 mg by mouth every evening 22  Yes Historical Provider, MD   gabapentin (NEURONTIN) 100 MG capsule Take 500 mg by mouth every evening. 22 Yes Historical Provider, MD   omeprazole (PRILOSEC) 20 MG delayed release capsule Take 20 mg by mouth Daily   Yes Historical Provider, MD   Fesoterodine Fumarate ER 8 MG TB24 Take 1 tablet by mouth daily 22  Yes Historical Provider, MD   levothyroxine (SYNTHROID) 112 MCG tablet Take 112 mcg by mouth Daily   Yes Historical Provider, MD   lubiprostone (AMITIZA) 8 MCG CAPS capsule Take 8 mcg by mouth 2 times daily (with meals)   Yes Historical Provider, MD   LORazepam (ATIVAN) 1 MG tablet Take 1 mg by mouth nightly.  23   Historical Provider, MD   Vibegron (GEMTESA) 75 MG TABS Take 1 tablet by mouth daily 10/18/22   Historical Provider, MD   denosumab (PROLIA) 60 MG/ML SOLN SC injection Inject 60 mg into the skin every 6 months 22   Historical Provider, MD   simvastatin (ZOCOR) 20 MG tablet Take 20 mg by mouth nightly    Historical Provider, MD          Inpatient Medications:   amiodarone 200 mg Oral Daily    metoprolol succinate  12.5 mg Oral Daily    sodium chloride  500 mL IntraVENous Once    heparin (porcine)  5,000 Units SubCUTAneous 3 times per day    OLANZapine  5 mg Oral Nightly    lidocaine  2 patch TransDERmal Daily    gabapentin  100 mg Oral TID    sodium chloride flush  5-40 mL IntraVENous 2 times per day    levothyroxine  112 mcg Oral QAM AC    atorvastatin  10 mg Oral Nightly    pantoprazole  40 mg Oral QAM AC    midazolam  0.5 mg IntraVENous Once    sodium chloride flush  5-40 mL IntraVENous 2 times per day       IV drips:   sodium chloride 25 mL/hr at 01/13/23 0932       PRN:  ketorolac, perflutren lipid microspheres, sodium chloride flush, ondansetron **OR** ondansetron, polyethylene glycol, acetaminophen **OR** acetaminophen, clonazePAM, oxyCODONE **OR** oxyCODONE, simethicone, sodium chloride flush, sodium chloride    Allergy:     Patient has no known allergies. Review of Systems:     All 12 point review of symptoms completed. Pertinent positives identified in the HPI, all other review of symptoms negative as below. CONSTITUTIONAL: No fatigue  SKIN: No rash or pruritis. EYES: No visual changes or diplopia. No scleral icterus. ENT: No Headaches, hearing loss or vertigo. No mouth sores or sore throat. CARDIOVASCULAR: No chest pain/chest pressure/chest discomfort. No palpitations. No edema. RESPIRATORY: No dyspnea. No cough or wheezing, no sputum production. GASTROINTESTINAL: No N/V/D. No abdominal pain, appetite loss, blood in stools. GENITOURINARY: No dysuria, trouble voiding, or hematuria. MUSCULOSKELETAL:  No gait disturbance, weakness or joint complaints. NEUROLOGICAL: No headache, diplopia, change in muscle strength, numbness or tingling. No change in gait, balance, coordination, mood, affect, memory, mentation, behavior. ENDOCRINE: No excessive thirst, fluid intake, or urination. No tremor. HEMATOLOGIC: No abnormal bruising or bleeding.   ALLERGY: No nasal congestion or hives. Physical Examination:     Vitals:    01/16/23 0436 01/16/23 0559 01/16/23 0925 01/16/23 1126   BP: (!) 91/54  (!) 100/55 (!) 94/57   Pulse: 86  86 86   Resp: 16 16 17 16   Temp: 98 °F (36.7 °C)  97.9 °F (36.6 °C) 98.1 °F (36.7 °C)   TempSrc: Axillary  Oral Axillary   SpO2: 95%  94% 96%   Weight:       Height:           Wt Readings from Last 3 Encounters:   01/15/23 145 lb 4.5 oz (65.9 kg)   08/29/19 136 lb 12.8 oz (62.1 kg)   07/05/18 140 lb (63.5 kg)         General Appearance:  Alert, cooperative, no distress, appears stated age Appropriate weight   Head:  Normocephalic, without obvious abnormality, atraumatic   Eyes:  PERRL, conjunctiva/corneas clear EOM intact  Ears normal   Throat no lesions       Nose: Nares normal, no drainage or sinus tenderness   Throat: Lips, mucosa, and tongue normal   Neck: Supple, symmetrical, trachea midline, no adenopathy, thyroid: not enlarged, symmetric, no tenderness/mass/nodules, no carotid bruit. Lungs:   Respirations unlabored, clear to auscultation bilaterally, without any wheezes, rubs or ronchi. Chest Wall:  No tenderness or deformity   Heart:  Regular rhythm, rate is controlled, S1, S2 normal, there is III/VI RUSB ELAN, there is no rub or gallop, cannot assess jvd, no bilateral lower extremity edema   Abdomen:   Soft, non-tender, bowel sounds active all four quadrants,  no masses, no organomegaly       Extremities: Extremities normal, atraumatic, no cyanosis.     Pulses: 2+ and symmetric   Skin: Skin color, texture, turgor normal, no rashes or lesions   Pysch: Normal mood and affect   Neurologic: Normal gross motor and sensory exam.  Cranial nerves intact        Labs:     Recent Labs     01/13/23  2354 01/16/23  1226    133*   K 3.7 4.2   BUN 13 16   CREATININE 0.9 0.8    103   CO2 22 20*   GLUCOSE 115* 101*   CALCIUM 8.8 8.3   MG  --  1.60*     Recent Labs     01/13/23  2354 01/16/23  1226   WBC 8.8 4.2   HGB 9.0* 7.6*   HCT 26.5* 23.2*    111*   MCV 93.2 95.3     No results for input(s): CHOLTOT, TRIG, HDL, CHOLHDL, LDL in the last 72 hours. Invalid input(s): Dheeraj Guerita  No results for input(s): PTT, INR in the last 72 hours. Invalid input(s): PT  Recent Labs     01/15/23  0036 01/15/23  0720 01/15/23  1543 01/15/23  2134 01/16/23  0809   TROPONINI 0.18* 0.16* 0.16* 0.19* 0.17*     No results for input(s): BNP in the last 72 hours. No results for input(s): TSH in the last 72 hours. No results for input(s): CHOL, HDL, LDLCALC, TRIG in the last 72 hours.]    Lab Results   Component Value Date    TROPONINI 0.17 (H) 01/16/2023         Telemetry:     Telemetry personally reviewed:  NSR    Assessment / Plan:     1. PAF with RVR. Patient converted into normal sinus rhythm with amiodarone drip. 2.  Chest pain in the setting of tachycardia. Patient denies any prior exertional symptoms and denies any chest pain while in sinus rhythm. 3.  Elevated troponin. Borderline elevation of troponins most likely due to demand ischemia in the setting of PAF RVR with hypotension, severe aortic stenosis and possibly significant underlying CAD. 4.  Severe aortic stenosis. 5.  Moderate mitral stenosis with mitral regurgitation  6. Chronic HFpEF. Patient is currently euvolemic and hemodynamically tenuous. - Will start amiodarone 200 mg p.o. daily and Toprol 12.5 mg daily to prevent further episodes of PAF RVR which will not be tolerated in the setting of severe aortic stenosis. - May consider anticoagulation versus baby aspirin in the near future for stroke prevention; will defer to primary team and orthopedic surgery timing and choice of anticoagulation.  - We will give normal saline 500 mill over 4 hours to support BP prior to giving beta-blocker. - Outpatient follow-up with cardiology to further discuss treatment options if patient wishes to proceed with TAVR.   - If patient develops recurrent AF RVR, please resume amiodarone drip. I have personally reviewed the reports and images of labs, radiological studies, cardiac studies including ECG's and telemetry, current and old medical records. The note was completed using EMR and Dragon dictation system. Every effort was made to ensure accuracy; however, inadvertent computerized transcription errors may be present. All questions and concerns were addressed to the patient/family. Alternatives to my treatment were discussed. I would like to thank you for providing me the opportunity to participate in the care of your patient. If you have any questions, please do not hesitate to contact me.      Emily Tucker MD, [facility], [address]  The [address]  [address] [address]  Ph: [phone]  Fax: [phone]

## 2023-01-16 NOTE — PLAN OF CARE
Problem: Musculoskeletal - Adult  Goal: Return mobility to safest level of function  1/16/2023 0440 by Vijay Up RN  Outcome: Progressing  Goal: Maintain proper alignment of affected body part  1/16/2023 0440 by Vijay Up RN  Outcome: Progressing  Flowsheets (Taken 1/15/2023 2002)  Maintain proper alignment of affected body part: Support and protect limb and body alignment per provider's orders  Goal: Return ADL status to a safe level of function  1/16/2023 0440 by Vijay Up RN  Outcome: Progressing     Problem: Safety - Adult  Goal: Free from fall injury  1/16/2023 0440 by Vijay Up RN  Outcome: Progressing     Problem: Discharge Planning  Goal: Discharge to home or other facility with appropriate resources  1/16/2023 0440 by Vijay Up RN  Outcome: Progressing     Problem: ABCDS Injury Assessment  Goal: Absence of physical injury  1/16/2023 0440 by Vijay Up RN  Outcome: Progressing     Problem: Pain  Goal: Verbalizes/displays adequate comfort level or baseline comfort level  1/16/2023 0440 by Vijay Up RN  Outcome: Progressing     Problem: Skin/Tissue Integrity  Goal: Absence of new skin breakdown  Description: 1. Monitor for areas of redness and/or skin breakdown  2. Assess vascular access sites hourly  3. Every 4-6 hours minimum:  Change oxygen saturation probe site  4. Every 4-6 hours:  If on nasal continuous positive airway pressure, respiratory therapy assess nares and determine need for appliance change or resting period. 1/16/2023 0804 by Vijay Up RN  Outcome: Progressing    Problem: Safety - Medical Restraint  Goal: Remains free of injury from restraints (Restraint for Interference with Medical Device)  Description: INTERVENTIONS:  1. Determine that other, less restrictive measures have been tried or would not be effective before applying the restraint  2. Evaluate the patient's condition at the time of restraint application  3.  Inform patient/family regarding the reason for restraint  4.  Q2H: Monitor safety, psychosocial status, comfort, nutrition and hydration    1/16/2023 0440 by Cristhian Vital RN  Outcome: Progressing

## 2023-01-16 NOTE — PROGRESS NOTES
On assessment patient presented more swollen this AM. Lomeli output was 300 this shift. Patient seem to be retaining fluids. LR fluid was stopped. Attending Physician made aware. Stated \"agree with stopping fluids\" stated will not be diuresing for now. Will continue to monitor.

## 2023-01-16 NOTE — CONSULTS
Consult Note  Physical Medicine and Rehabilitation    Patient: Kt Goodman  [id number]  Date: 1/16/2023      Chief Complaint: Left hip pain after fall, displaced comminuted intertrochanteric fracture of left femur    History of Present Illness/Hospital Course: This is a 81 yo Female, with history of bilateral breast cancer, s/p left mastectomy 1975, right lumpectomy and radiation in 1999, DVT 2009 while on [place], hypothyroidism, esophageal spasm, schatzki's ring, EGD with dilation 1/2011, GERD, hepatitis B, OA, spinal stenosis, Rt Hip Fracture s/p surgery 2/2010, Thoracic and Lumbar compression fractures, who presents for evaluation after fall. She was found to have sustained mildly displaced comminuted intratrochanteric fracture of the left femur. Patient alert, oriented, and coherent on arrival to ED. Daughter in-law, Wiliam Carriazles, present and assists with clinical history. This morning patient walked from her bedroom to the bathroom. When she reached the bathroom, she felt lightheaded. States that she must have turned around, lost her balance, and fell onto her left side. After falling she was in significant pain and was unable to rise on her own. Patient has a  who comes every morning to walk her dog at ~ 730 AM.    Knowing that her  was coming, patient screamed out for help. Reportedly she was found likely on her left side on the bathroom floor. Her  called EMS and patient was taken to [facility] for evaluation. Patient has been in her usual state of health. She walks about 1 mile daily without assistance. She used to walk longer distances. Patient denies recent fevers, chills, cough, shortness of breath, chest pain, palpitations at rest and on exertion. She denies history of nausea, vomiting, abdominal pain, dysuria, diarrhea. She eats three meals daily. States that she does not eat much during lunchtime.       Daughter in-law, Wiliam Carrizales said patient had another fall in 2022 while walking her dog; she was found on the side walk. After, she has been working with PT/OT to work on her strength and stability. She may have fallen a couple of times after. Prior Level of Function:  Independent for mobility walking one mile per day, ADLs, and IADLs. Current Level of Function:  Unable to tolerate movement of Left leg due to pain. Pertinent Social History:  Support: Lives at home alone  Home set-up: 5 steps    Past Medical History:   Diagnosis Date    Cancer ([address]     Hepatitis     Hyperlipidemia     Osteoarthritis        Past Surgical History:   Procedure Laterality Date    BREAST LUMPECTOMY      Right     SECTION      HIP SURGERY Left 2023    LEFT HIP INTERTROCHOANTERIC NAILING performed by Kristina Ball MD at [address] [place] [address]      Left       Family History   Problem Relation Age of Onset    Cancer Mother         Breat     Arthritis Other        Social History     Socioeconomic History    Marital status:      Spouse name: None    Number of children: None    Years of education: None    Highest education level: None   Tobacco Use    Smoking status: Former    Smokeless tobacco: Never   Substance and Sexual Activity    Alcohol use:  Yes     Alcohol/week: 1.0 standard drink     Types: 1 Standard drinks or equivalent per week     Comment: rarely    Drug use: No    Sexual activity: Not Currently     Partners: Male           REVIEW OF SYSTEMS:   CONSTITUTIONAL: negative for fevers, chills, diaphoresis, appetite change, night sweats, unexpected weight change, fatigue  EYES: negative for blurred vision, eye discharge, visual disturbance and icterus  HEENT: negative for hearing loss, tinnitus, ear drainage, sinus pressure, nasal congestion, epistaxis and snoring  RESPIRATORY: Negative for hemoptysis, cough, sputum production  CARDIOVASCULAR: negative for chest pain, palpitations, exertional chest pressure/discomfort, syncope, edema  GASTROINTESTINAL: negative for nausea, vomiting, diarrhea, blood in stool, abdominal pain, constipation  GENITOURINARY: negative for frequency, dysuria, urinary incontinence, decreased urine volume, and hematuria  HEMATOLOGIC/LYMPHATIC: negative for easy bruising, bleeding and lymphadenopathy  ALLERGIC/IMMUNOLOGIC: negative for recurrent infections, angioedema, anaphylaxis and drug reactions  ENDOCRINE: negative for weight changes and diabetic symptoms including polyuria, polydipsia and polyphagia  MUSCULOSKELETAL: negative joint swelling, decreased range of motion. +Left leg pain  NEUROLOGICAL: negative for headaches, slurred speech, unilateral weakness  PSYCHIATRIC/BEHAVIORAL: negative for hallucinations, behavioral problems, confusion and agitation. Physical Examination:  Vitals: Patient Vitals for the past 24 hrs:   BP Temp Temp src Pulse Resp SpO2   01/16/23 0925 (!) 100/55 97.9 °F (36.6 °C) Oral 86 17 94 %   01/16/23 0559 -- -- -- -- 16 --   01/16/23 0436 (!) 91/54 98 °F (36.7 °C) Axillary 86 16 95 %   01/15/23 2312 (!) 93/58 98.1 °F (36.7 °C) Axillary 94 16 93 %   01/15/23 1959 92/60 98 °F (36.7 °C) Axillary 86 16 96 %   01/15/23 1827 (!) 96/57 98.1 °F (36.7 °C) Oral 88 16 96 %   01/15/23 1545 (!) 91/55 98.6 °F (37 °C) Oral 83 17 --   01/15/23 1543 -- -- -- -- -- 98 %   01/15/23 1114 114/69 -- -- -- -- --   01/15/23 1100 (!) 84/49 -- -- -- -- --     Const: Alert. WDWN. No distress  Eyes: Conjunctiva noninjected, no icterus noted; pupils equal, round, and reactive to light. HENT: Atraumatic, normocephalic; Oral mucosa moist  Neck: Trachea midline, neck supple. No thyromegaly noted. CV: Regular rate and rhythm. Systolic murmur, decreased S1. Resp: Lungs clear to auscultation bilaterally, no rales wheezes or ronchi, no retractions. Respirations unlabored. GI: Soft, nontender, nondistended. Normal bowel sounds. No palpable masses. Skin: Normal temperature and turgor.  No rashes or breakdown noted.   Ext: No varicosities. MSK: No joint tenderness, erythema, warmth noted. AROM intact. Neuro: Alert, oriented, appropriate. No cranial nerve deficits appreciated. Sensation intact to light touch. Motor examination reveals normal strength in all four limbs diffusely. No abnormalities with finger/nose or heel/shin noted. Reflexes normal and symmetric. Psych: Stable mood, normal judgement, normal affect     Lab Results   Component Value Date    WBC 8.8 01/13/2023    HGB 9.0 (L) 01/13/2023    HCT 26.5 (L) 01/13/2023    MCV 93.2 01/13/2023     01/13/2023     Lab Results   Component Value Date    INR 1.10 01/12/2023    PROTIME 14.2 01/12/2023     Lab Results   Component Value Date    CREATININE 0.9 01/13/2023    BUN 13 01/13/2023     01/13/2023    K 3.7 01/13/2023     01/13/2023    CO2 22 01/13/2023     Lab Results   Component Value Date    ALT 8 (L) 01/13/2023    AST 16 01/13/2023    ALKPHOS 37 (L) 01/13/2023    BILITOT 0.5 01/13/2023       Most recent echocardiogram revealed:   Left ventricular cavity size is normal. Normal left ventricular wall   thickness. Overall left ventricular systolic function appears normal with an   ejection fraction of 60-65%. No regional wall motion abnormalities are   noted. Diastolic filling parameters suggest grade II diastolic dysfunction. The mitral valve leaflets are thickened / calcified. Mitral annular   calcification is present. Mild to moderate mitral regurgitation is present. The peak mitral valve velocity is 3.01m/s and the mean pressure gradient is   15mmHg. The mitral valve pressure half-time is calculated at 72 msec. Mild-to-moderate mitral stenosis. The aortic valve leaflets are slightly   thickened /calcified. Severe aortic stenosis with a peak velocity of 5.3m/s   and a mean pressure gradient of 66mmHg. Mild tricuspid regurgitation. Estimated pulmonary artery systolic pressure is at 48 mmHg assuming a right   atrial pressure of 3 mmHg. The left atrium is mildly dilated. Most recent EKG revealed:  Most recent scanned shows A fib. Most recent imaging studies revealed: On my review, CXR displays stable coarse lung markings. XR CHEST PORTABLE   Final Result   Stable coarse lung markings in the right for which I cannot exclude some element of continued pneumonitis. Some of this is atelectatic in nature. CT HEAD WO CONTRAST   Final Result      Chronic ischemic changes of the brain and atrophy with no midline shift or hydrocephalus or definitive hemorrhage. Extensive artifact noted likely from patient motion            XR TIBIA FIBULA LEFT (2 VIEWS)   Final Result      Normal radiographs of the left tibia and fibula. Knee compartment narrowing appreciated. Left ankle 3 view:      HISTORY: Trauma:      FINDINGS:      AP lateral oblique views demonstrate normal alignment. There is no acute fracture or dislocation      IMPRESSION:      No acute fracture       LEFT HIP 2 VIEW , AP view pelvis      HISTORY: [place] with hip pain, previous left hip pinning      PROCEDURE: AP view the pelvis and lateral view of left hip were obtained      COMPARISON: Intraoperative studies from January 12, 2023      FINDINGS:      There is normal alignment of the right hip with 4 screws noted traversing the trochanter femoral neck and head from previous fracture fixation without significant displacement, unchanged. Left trochanteric hip fixation is intact with transfixation screws through the trochanter femoral neck and long intramedullary amaris remaining in stable position compared to recent study. No discrete acute fracture or dislocation. No expansile cystic or destructive change identified      AP view of the pelvis demonstrates no acute bony defect. Diffuse demineralization noted. Degenerative changes lower lumbar spine.     The sacroiliac margins appear normal.    There is some minimal sclerosis along the acetabulum      IMPRESSION:      No acute deformity involving the right or left hip. Previous pinning of the right hip is unchanged. Previous fracture fixation of the left trochanteric and hip is intact      No acute bony defect in the pelvis. Diffuse demineralization            XR SHOULDER LEFT (MIN 2 VIEWS)   Final Result      Chronic advanced arthropathy of the left shoulder with loose bodies and subarticular erosions affecting the humeral head and advanced glenohumeral arthritic change and remodeling         XR ANKLE LEFT (2 VIEWS)   Final Result      Normal radiographs of the left tibia and fibula. Knee compartment narrowing appreciated. Left ankle 3 view:      HISTORY: Trauma:      FINDINGS:      AP lateral oblique views demonstrate normal alignment. There is no acute fracture or dislocation      IMPRESSION:      No acute fracture       LEFT HIP 2 VIEW , AP view pelvis      HISTORY: [place] with hip pain, previous left hip pinning      PROCEDURE: AP view the pelvis and lateral view of left hip were obtained      COMPARISON: Intraoperative studies from January 12, 2023      FINDINGS:      There is normal alignment of the right hip with 4 screws noted traversing the trochanter femoral neck and head from previous fracture fixation without significant displacement, unchanged. Left trochanteric hip fixation is intact with transfixation screws through the trochanter femoral neck and long intramedullary amaris remaining in stable position compared to recent study. No discrete acute fracture or dislocation. No expansile cystic or destructive change identified      AP view of the pelvis demonstrates no acute bony defect. Diffuse demineralization noted. Degenerative changes lower lumbar spine. The sacroiliac margins appear normal.    There is some minimal sclerosis along the acetabulum      IMPRESSION:      No acute deformity involving the right or left hip. Previous pinning of the right hip is unchanged.       Previous fracture fixation of the left trochanteric and hip is intact      No acute bony defect in the pelvis. Diffuse demineralization            XR KNEE RIGHT (1-2 VIEWS)   Final Result      1. Right knee: Advanced arthritic change as described above. No sign of any fracture or acute deformity. 2. Left knee: Moderate degenerative change particularly involving the lateral compartment with some prepatellar soft tissue swelling. No acute defect      XR HIP 2-3 VW W PELVIS LEFT   Final Result      Normal radiographs of the left tibia and fibula. Knee compartment narrowing appreciated. Left ankle 3 view:      HISTORY: Trauma:      FINDINGS:      AP lateral oblique views demonstrate normal alignment. There is no acute fracture or dislocation      IMPRESSION:      No acute fracture       LEFT HIP 2 VIEW , AP view pelvis      HISTORY: [place] with hip pain, previous left hip pinning      PROCEDURE: AP view the pelvis and lateral view of left hip were obtained      COMPARISON: Intraoperative studies from January 12, 2023      FINDINGS:      There is normal alignment of the right hip with 4 screws noted traversing the trochanter femoral neck and head from previous fracture fixation without significant displacement, unchanged. Left trochanteric hip fixation is intact with transfixation screws through the trochanter femoral neck and long intramedullary amaris remaining in stable position compared to recent study. No discrete acute fracture or dislocation. No expansile cystic or destructive change identified      AP view of the pelvis demonstrates no acute bony defect. Diffuse demineralization noted. Degenerative changes lower lumbar spine. The sacroiliac margins appear normal.    There is some minimal sclerosis along the acetabulum      IMPRESSION:      No acute deformity involving the right or left hip. Previous pinning of the right hip is unchanged.       Previous fracture fixation of the left trochanteric and hip is intact      No acute bony defect in the pelvis. Diffuse demineralization            XR KNEE LEFT (1-2 VIEWS)   Final Result      1. Right knee: Advanced arthritic change as described above. No sign of any fracture or acute deformity. 2. Left knee: Moderate degenerative change particularly involving the lateral compartment with some prepatellar soft tissue swelling. No acute defect      XR HIP LEFT (2-3 VIEWS)   Final Result      Intraoperative fluoroscopy for open reduction internal fixation of the left hip. Refer to the operative note for details. FLUORO FOR SURGICAL PROCEDURES   Final Result      Intraoperative fluoroscopy for open reduction internal fixation of the left hip. Refer to the operative note for details. CT CHEST ABDOMEN PELVIS W CONTRAST Additional Contrast? None   Final Result      CHEST:   1.  Small right pleural effusion and trace left pleural effusion. Mild bibasilar atelectasis. 2.  Stable remote compression deformities at T12 and L1 compared to prior MRI from 2015. There is also new compression deformity at T11 compared to the study, but this is age indeterminant and may also be remote. Recommend correlation with point    tenderness in this level. 3.  Motion limited exam without definite evidence of acute traumatic intrathoracic abnormality. 4.  Moderate hiatal hernia. ABDOMEN AND PELVIS:   1.  Mildly displaced comminuted intratrochanteric fracture of the left femur. 2.  Surrounding stranding/hematoma in the left thigh as detailed above. 3.  No additional acute traumatic abnormality identified in the abdomen and pelvis per         CT CERVICAL SPINE WO CONTRAST   Final Result   1. No acute traumatic abnormality of the cervical spine. 2. Stable multilevel degenerative disc disease and facet arthropathy. 3. Stable slight rotatory subluxation of C1 on C2 on the right side. CT HEAD WO CONTRAST   Final Result      1.  Stable exam with no acute intracranial abnormality. XR FEMUR LEFT (MIN 2 VIEWS)   Final Result   Addendum (preliminary) 1 of 1   [ADDENDUM #1 ]   Addendum: Initial report by Dr. Aleena Weaver. Addendum by Dr. Estefani Lomeli. Frontal view of the pelvis and frontal and frog-leg lateral views of the    left femur were obtained. There is a nondisplaced intertrochanteric    fracture of the left femur. No dislocation. Orthopedic hardware of the    proximal right femur is noted. No    additional fracture deformity. Final   Nondisplaced intertrochanteric fracture on the left. IMPRESSION:      No pneumothorax. Diffusely prominent interstitial markings presumably representing chronic interstitial lung disease. No prior for comparison. Normal cardiomediastinal silhouette. Presumed old fracture deformity of the proximal left humerus. If there is left shoulder pain then dedicated radiographs are indicated. XR PELVIS (1-2 VIEWS)   Final Result   Addendum (preliminary) 1 of 1   [ ADDENDUM #1 ]   Addendum: Initial report by Dr. Aleena Weaver. Addendum by Dr. Estefani Lomeli. Frontal view of the pelvis and frontal and frog-leg lateral views of the    left femur were obtained. There is a nondisplaced intertrochanteric    fracture of the left femur. No dislocation. Orthopedic hardware of the    proximal right femur is noted. No    additional fracture deformity. Final   Nondisplaced intertrochanteric fracture on the left. IMPRESSION:      No pneumothorax. Diffusely prominent interstitial markings presumably representing chronic interstitial lung disease. No prior for comparison. Normal cardiomediastinal silhouette. Presumed old fracture deformity of the proximal left humerus. If there is left shoulder pain then dedicated radiographs are indicated. XR CHEST PORTABLE   Final Result   Addendum (preliminary) 1 of 1    ADDENDUM #1    Addendum: Initial report by Dr. Aleena Weaver. Addendum by Dr. Estefani Lomeli.       Frontal view of the pelvis and frontal and frog-leg lateral views of the    left femur were obtained. There is a nondisplaced intertrochanteric    fracture of the left femur. No dislocation. Orthopedic hardware of the    proximal right femur is noted. No    additional fracture deformity. Final   Nondisplaced intertrochanteric fracture on the left. IMPRESSION:      No pneumothorax. Diffusely prominent interstitial markings presumably representing chronic interstitial lung disease. No prior for comparison. Normal cardiomediastinal silhouette. Presumed old fracture deformity of the proximal left humerus. If there is left shoulder pain then dedicated radiographs are indicated. Assessment:  1. Mildly Displaced Comminuted Intertrochanteric Fracture of Left Femur   -s/p ORIF on 1/12/23   -narcotics for pain control  2. Recurrent Falls, with preceding Lightheadedness   -Walks 1 mile per day   -History of falls, preceded by lightheadedness  3. Aortic Stenosis      Mitral Regurgitation      Mitral Stenosis   -ECHO 1/12/23 showed normal LV cavity size, wall thickness, LVEF 60-65%, no regional wall motion abnormalities. Grade II diastolic dysfunction. Mitral valve leaflets are thickened/calcified. MAC. Mild to moderate MR and MS. Severe aortic stenosis with peak velocity of 5.3 m/s, mean pressure gradient of 66 mmHg. Mild TR. PSAP 48 mmHg. - cardiology consulted for consideration of TAVR. Patient extremely functional; walks 1 mile daily without assistive device. Impairments- Decreased functional mobility, Decreased ADLs    Recommendations:    Good candidate for ARU 10/24 on AM-PAC. Patient declining at this time, may reconsider when her pain is better controlled. Thank you for this consult. Please contact me with any questions or concerns. Bhavna Velasco MD  PGY-1  1/16/2023  10:48 AM      This patient has been seen, examined, and discussed with the resident.  I was part of the key critical services provided to the patient. I agree with the residents documentation. This note may have been altered to reflect my own examination findings, impression, and recommendations.      Nataliia Moss D.O. M.P.H  PM&R  1/16/2023  1:32 PM

## 2023-01-16 NOTE — PLAN OF CARE
Problem: Musculoskeletal - Adult  Goal: Maintain proper alignment of affected body part  Outcome: Progressing  Flowsheets (Taken 1/15/2023 2002)  Maintain proper alignment of affected body part: Support and protect limb and body alignment per provider's orders     Problem: Safety - Adult  Goal: Free from fall injury  Outcome: Progressing     Problem: Pain  Goal: Verbalizes/displays adequate comfort level or baseline comfort level  Outcome: Progressing     Problem: Skin/Tissue Integrity  Goal: Absence of new skin breakdown  Description: 1. Monitor for areas of redness and/or skin breakdown  2. Assess vascular access sites hourly  3. Every 4-6 hours minimum:  Change oxygen saturation probe site  4. Every 4-6 hours:  If on nasal continuous positive airway pressure, respiratory therapy assess nares and determine need for appliance change or resting period. Outcome: Progressing     Problem: Safety - Medical Restraint  Goal: Remains free of injury from restraints (Restraint for Interference with Medical Device)  Description: INTERVENTIONS:  1. Determine that other, less restrictive measures have been tried or would not be effective before applying the restraint  2. Evaluate the patient's condition at the time of restraint application  3. Inform patient/family regarding the reason for restraint  4.  Q2H: Monitor safety, psychosocial status, comfort, nutrition and hydration  Outcome: Progressing

## 2023-01-16 NOTE — PROGRESS NOTES
Occupational Therapy  Occupational Therapy  Daily Treatment Note  Patient Name: Gali Foss  MRN: [id number]    Chart Reviewed: Yes       Other position/activity restrictions: WBAT     Additional Pertinent Hx: Patient presents to ED post fall d/t loss of balance in her home. No acute findings on CT head and neck. Xray reveals intertrochanteric fracture of L femur and chronic interstitial lung disease. CT of chest/abdomen reveals pleural effusion bilaterally, stable compression fractures of thoracic spine, and hiatal hernia. PMH: OA, hyperlipidemia, breast cancer, hepatitis      Diagnosis: L Femur fx s/p LEFT HIP INTERTROCHOANTERIC NAILING  Treatment Diagnosis: impaired ADLs/ functional transfers and mobility 2/2 L femur fx    Subjective: Pt supine in bed upon entry, initially stating she wasn't moving her LLE, with encouragement pt participated in therapy session. General Comment: Pt supine to sit Max A x 2 slow and effortful with max vcs for encouragement and technique. Pt sit EOB SBA/CGA. Pt sit to stand with [employer] [place] Max A x 2. Pt transferred to recliner via [employer] [place], stand to sit Max A x 2 and Max A x 2 to scoot back into recliner. Call light in reach, chair alarm on, pt combed hair and equipment left for pt to complete oral care at leisure.     Pain: Pain meds given ~15 min prior to therapy per RN, pt with pain LLE with movement, not rated    Social/Functional History  Lives With: Alone  Type of Home: Condo  Home Layout: One level  Home Access: Stairs to enter with rails  Entrance Stairs - Number of Steps: 5  Entrance Stairs - Rails: Right  Bathroom Shower/Tub: Walk-in shower  Bathroom Toilet: Standard  Bathroom Equipment: Grab bars in shower, Grab bars around toilet  Home Equipment: None  Has the patient had two or more falls in the past year or any fall with injury in the past year?: Yes (says her balance is poor)  Receives Help From: Family  ADL Assistance: [address] [place] [address] Avenue: Independent  Ambulation Assistance: Independent  Transfer Assistance: Independent  Active : Yes  Mode of Transportation: Car  Prior Function  Receives Help From: Family  ADL Assistance: Independent  Homemaking Assistance: Independent  Ambulation Assistance: Independent  Transfer Assistance: Independent    Objective:    Cognition/Orientation:WFL, anxious and need max encouragement for participation    Bed mobility   Supine to sit: Max A x 2  Scooting:Dependent x 2 to scoot back into recliner    Functional Mobility   Sit to Stand: Max A x 2 with [employer]  Stand to Sit:Max Ax  2 from [employer]  Bed to Chair Transfer: Dependent Max Ax  2 with [employer]  Commode Transfer:N/A      ADLs   Grooming: pt combed hair with setup in recliner and left oral care supplies to complete at pt's leisure  Bathing:N/A  UB dressing:N/A  LB dressing:N/A  Toileting:N/A  Other:    UE Exercises N/A    Activity Tolerance: Fair      Patient Education: transfer training, importance of OOB activity, d/c rec, role of OT    Safety Devices in Place:chair alarm on and call light in reach    Assessment: Pt very anxious about movement and needed max education for benefit of OOB activity. Pt slow and effortful for bed mobility taking extended time and Max A x 2 supine to sit. Pt Dependent for transfer from EOB to recliner using [employer] at [facility] A x 2. Recommend continued inpt therapy at d/c to maximize functional independence and safety. Continue with POC. Discharge Recommendations: Yisel Dsouza scored a 12/24 on the AM-PAC ADL Inpatient form. Current research shows that an AM-PAC score of 17 or less is typically not associated with a discharge to the patient's home setting. Based on the patient's AM-PAC score and their current ADL deficits, it is recommended that the patient have 3-5 sessions per week of Occupational Therapy at d/c to increase the patient's independence.   Please see assessment section for further patient specific details. If patient discharges prior to next session this note will serve as a discharge summary. Please see below for the latest assessment towards goals. Equipment Needs:  defer to next setting    Therapy Time:   Individual Concurrent Group Co-treatment   Time In  1430         Time Out  1500         Minutes  30         Timed Code Treatment Minutes: 30 min   Total Treatment Minutes:   30 min    Goals:  Short Term Goals  Time Frame for Short Term Goals: at d/c  Short Term Goal 1: Stance x 4 mins with CGA x 1 for ADLs/ IADLs  Short Term Goal 2: Functional transfers with CGA  Short Term Goal 3: LE dressing with MIn A and AE prn       No goals met, continue all goals  Plan:      Times Per Week: 5-7x   Times Per Day: Once a day    If patient is discharged prior to next treatment, this note will serve as the discharge summary.     Jen Lee, [address] [facility]

## 2023-01-16 NOTE — PROGRESS NOTES
Hospitalist Progress Note      PCP: Mayo Galicia    Date of Admission: 1/12/2023    Chief Complaint: worsening lethargy      Subjective: In normal sinus rhythm. Borderline BP. She denies lightheadedness, shortness of breath, chest pain, palpitations. Her left thigh and leg pain is controlled on oxycodone and gabapentin. She denies nausea, abdominal pain, dysuria, diarrhea.       Medications:  Reviewed    Infusion Medications    lactated ringers 75 mL/hr at 01/16/23 0424    sodium chloride 25 mL/hr at 01/13/23 0932     Scheduled Medications    heparin (porcine)  5,000 Units SubCUTAneous 3 times per day    OLANZapine  5 mg Oral Nightly    lidocaine  2 patch TransDERmal Daily    gabapentin  100 mg Oral TID    sodium chloride flush  5-40 mL IntraVENous 2 times per day    levothyroxine  112 mcg Oral QAM AC    atorvastatin  10 mg Oral Nightly    pantoprazole  40 mg Oral QAM AC    midazolam  0.5 mg IntraVENous Once    sodium chloride flush  5-40 mL IntraVENous 2 times per day     PRN Meds: HYDROmorphone, ketorolac, perflutren lipid microspheres, sodium chloride flush, ondansetron **OR** ondansetron, polyethylene glycol, acetaminophen **OR** acetaminophen, clonazePAM, oxyCODONE **OR** oxyCODONE, simethicone, sodium chloride flush, sodium chloride      Intake/Output Summary (Last 24 hours) at 1/16/2023 0438  Last data filed at 1/15/2023 1833  Gross per 24 hour   Intake 722.42 ml   Output 550 ml   Net 172.42 ml         Physical Exam Performed:    BP (!) 93/58   Pulse 94   Temp 98.1 °F (36.7 °C) (Axillary)   Resp 16   Ht 5' 1\" (1.549 m)   Wt 145 lb 4.5 oz (65.9 kg)   SpO2 93%   BMI 27.45 kg/m²     GEN alert, in distressed about her pain  HEENT normocephalic, anicteric sclera, EOMI, mucosa moist, no stridor  NECK supple, trachea midline  RESP on RA, in no distress, clear to auscultation  CARDS RRR, S1, S2, systolic murmurs over RUSB, LUSB, left upper extremity edema, left thigh edema, radial pulse 2+, DP pulse 2+  ABD distended, +BS, soft nontender  MSK left hip tenderness, no cyanosis, no clubbing  SKIN warm, dry  NEURO alert, oriented x 3, no facial asymmetry, no dysarthria, moving spontaneously  PSYCH normal mood      Labs:   Recent Labs     01/13/23  2354   WBC 8.8   HGB 9.0*   HCT 26.5*          Recent Labs     01/13/23  0616 01/13/23  2354    139   K 4.3 3.7    106   CO2 22 22   BUN 12 13   CREATININE 0.8 0.9   CALCIUM 8.8 8.8   PHOS 5.1* 3.0       Recent Labs     01/13/23  0616   AST 16   ALT 8*   BILITOT 0.5   ALKPHOS 37*       No results for input(s): INR in the last 72 hours. Recent Labs     01/15/23  0720 01/15/23  1543 01/15/23  2134   TROPONINI 0.16* 0.16* 0.19*         Urinalysis:      Lab Results   Component Value Date/Time    NITRU Negative 01/13/2023 03:16 AM    WBCUA 0-2 01/13/2023 03:16 AM    RBCUA  01/13/2023 03:16 AM    BLOODU MODERATE 01/13/2023 03:16 AM    SPECGRAV 1.025 01/13/2023 03:16 AM    GLUCOSEU Negative 01/13/2023 03:16 AM       Radiology:  CT HEAD WO CONTRAST   Final Result      Chronic ischemic changes of the brain and atrophy with no midline shift or hydrocephalus or definitive hemorrhage. Extensive artifact noted likely from patient motion            XR TIBIA FIBULA LEFT (2 VIEWS)   Final Result      Normal radiographs of the left tibia and fibula. Knee compartment narrowing appreciated. Left ankle 3 view:      HISTORY: Trauma:      FINDINGS:      AP lateral oblique views demonstrate normal alignment.     There is no acute fracture or dislocation      IMPRESSION:      No acute fracture       LEFT HIP 2 VIEW , AP view pelvis      HISTORY: [place] with hip pain, previous left hip pinning      PROCEDURE: AP view the pelvis and lateral view of left hip were obtained      COMPARISON: Intraoperative studies from January 12, 2023      FINDINGS:      There is normal alignment of the right hip with 4 screws noted traversing the trochanter femoral neck and head from previous fracture fixation without significant displacement, unchanged. Left trochanteric hip fixation is intact with transfixation screws through the trochanter femoral neck and long intramedullary amaris remaining in stable position compared to recent study. No discrete acute fracture or dislocation. No expansile cystic or destructive change identified      AP view of the pelvis demonstrates no acute bony defect. Diffuse demineralization noted. Degenerative changes lower lumbar spine. The sacroiliac margins appear normal.    There is some minimal sclerosis along the acetabulum      IMPRESSION:      No acute deformity involving the right or left hip. Previous pinning of the right hip is unchanged. Previous fracture fixation of the left trochanteric and hip is intact      No acute bony defect in the pelvis. Diffuse demineralization            XR SHOULDER LEFT (MIN 2 VIEWS)   Final Result      Chronic advanced arthropathy of the left shoulder with loose bodies and subarticular erosions affecting the humeral head and advanced glenohumeral arthritic change and remodeling         XR ANKLE LEFT (2 VIEWS)   Final Result      Normal radiographs of the left tibia and fibula. Knee compartment narrowing appreciated. Left ankle 3 view:      HISTORY: Trauma:      FINDINGS:      AP lateral oblique views demonstrate normal alignment. There is no acute fracture or dislocation      IMPRESSION:      No acute fracture       LEFT HIP 2 VIEW , AP view pelvis      HISTORY: [place] with hip pain, previous left hip pinning      PROCEDURE: AP view the pelvis and lateral view of left hip were obtained      COMPARISON: Intraoperative studies from January 12, 2023      FINDINGS:      There is normal alignment of the right hip with 4 screws noted traversing the trochanter femoral neck and head from previous fracture fixation without significant displacement, unchanged.       Left trochanteric hip fixation is intact with transfixation screws through the trochanter femoral neck and long intramedullary amaris remaining in stable position compared to recent study. No discrete acute fracture or dislocation. No expansile cystic or destructive change identified      AP view of the pelvis demonstrates no acute bony defect. Diffuse demineralization noted. Degenerative changes lower lumbar spine. The sacroiliac margins appear normal.    There is some minimal sclerosis along the acetabulum      IMPRESSION:      No acute deformity involving the right or left hip. Previous pinning of the right hip is unchanged. Previous fracture fixation of the left trochanteric and hip is intact      No acute bony defect in the pelvis. Diffuse demineralization            XR KNEE RIGHT (1-2 VIEWS)   Final Result      1. Right knee: Advanced arthritic change as described above. No sign of any fracture or acute deformity. 2. Left knee: Moderate degenerative change particularly involving the lateral compartment with some prepatellar soft tissue swelling. No acute defect      XR HIP 2-3 VW W PELVIS LEFT   Final Result      Normal radiographs of the left tibia and fibula. Knee compartment narrowing appreciated. Left ankle 3 view:      HISTORY: Trauma:      FINDINGS:      AP lateral oblique views demonstrate normal alignment. There is no acute fracture or dislocation      IMPRESSION:      No acute fracture       LEFT HIP 2 VIEW , AP view pelvis      HISTORY: [place] with hip pain, previous left hip pinning      PROCEDURE: AP view the pelvis and lateral view of left hip were obtained      COMPARISON: Intraoperative studies from January 12, 2023      FINDINGS:      There is normal alignment of the right hip with 4 screws noted traversing the trochanter femoral neck and head from previous fracture fixation without significant displacement, unchanged.       Left trochanteric hip fixation is intact with transfixation screws through the trochanter femoral neck and long intramedullary amaris remaining in stable position compared to recent study. No discrete acute fracture or dislocation. No expansile cystic or destructive change identified      AP view of the pelvis demonstrates no acute bony defect. Diffuse demineralization noted. Degenerative changes lower lumbar spine. The sacroiliac margins appear normal.    There is some minimal sclerosis along the acetabulum      IMPRESSION:      No acute deformity involving the right or left hip. Previous pinning of the right hip is unchanged. Previous fracture fixation of the left trochanteric and hip is intact      No acute bony defect in the pelvis. Diffuse demineralization            XR KNEE LEFT (1-2 VIEWS)   Final Result      1. Right knee: Advanced arthritic change as described above. No sign of any fracture or acute deformity. 2. Left knee: Moderate degenerative change particularly involving the lateral compartment with some prepatellar soft tissue swelling. No acute defect      XR HIP LEFT (2-3 VIEWS)   Final Result      Intraoperative fluoroscopy for open reduction internal fixation of the left hip. Refer to the operative note for details. FLUORO FOR SURGICAL PROCEDURES   Final Result      Intraoperative fluoroscopy for open reduction internal fixation of the left hip. Refer to the operative note for details. CT CHEST ABDOMEN PELVIS W CONTRAST Additional Contrast? None   Final Result      CHEST:   1.  Small right pleural effusion and trace left pleural effusion. Mild bibasilar atelectasis. 2.  Stable remote compression deformities at T12 and L1 compared to prior MRI from 2015. There is also new compression deformity at T11 compared to the study, but this is age indeterminant and may also be remote. Recommend correlation with point    tenderness in this level. 3.  Motion limited exam without definite evidence of acute traumatic intrathoracic abnormality.    4.  Moderate hiatal hernia. ABDOMEN AND PELVIS:   1.  Mildly displaced comminuted intratrochanteric fracture of the left femur. 2.  Surrounding stranding/hematoma in the left thigh as detailed above. 3.  No additional acute traumatic abnormality identified in the abdomen and pelvis per         CT CERVICAL SPINE WO CONTRAST   Final Result   1. No acute traumatic abnormality of the cervical spine. 2. Stable multilevel degenerative disc disease and facet arthropathy. 3. Stable slight rotatory subluxation of C1 on C2 on the right side. CT HEAD WO CONTRAST   Final Result      1. Stable exam with no acute intracranial abnormality. XR FEMUR LEFT (MIN 2 VIEWS)   Final Result   Addendum (preliminary) 1 of 1   [ADDENDUM #1   Addendum: Initial report by Dr. Jillian Mcintyre. Addendum by Dr. Gigi Villavicencio. Frontal view of the pelvis and frontal and frog-leg lateral views of the    left femur were obtained. There is a nondisplaced intertrochanteric    fracture of the left femur. No dislocation. Orthopedic hardware of the    proximal right femur is noted. No    additional fracture deformity. Final   Nondisplaced intertrochanteric fracture on the left. IMPRESSION:      No pneumothorax. Diffusely prominent interstitial markings presumably representing chronic interstitial lung disease. No prior for comparison. Normal cardiomediastinal silhouette. Presumed old fracture deformity of the proximal left humerus. If there is left shoulder pain then dedicated radiographs are indicated. XR PELVIS (1-2 VIEWS)   Final Result   Addendum (preliminary) 1 of 1   [ ADDENDUM #1 *   Addendum: Initial report by Dr. Jillian Mcintyre. Addendum by Dr. Gigi Villavicencio. Frontal view of the pelvis and frontal and frog-leg lateral views of the    left femur were obtained. There is a nondisplaced intertrochanteric    fracture of the left femur. No dislocation. Orthopedic hardware of the    proximal right femur is noted.  No    additional fracture deformity. Final   Nondisplaced intertrochanteric fracture on the left. IMPRESSION:      No pneumothorax. Diffusely prominent interstitial markings presumably representing chronic interstitial lung disease. No prior for comparison. Normal cardiomediastinal silhouette. Presumed old fracture deformity of the proximal left humerus. If there is left shoulder pain then dedicated radiographs are indicated. XR CHEST PORTABLE   Final Result   Addendum (preliminary) 1 of 1   [ADDENDUM #1    Addendum: Initial report by Dr. Talat Guzman. Addendum by Dr. Luanne Calix. Frontal view of the pelvis and frontal and frog-leg lateral views of the    left femur were obtained. There is a nondisplaced intertrochanteric    fracture of the left femur. No dislocation. Orthopedic hardware of the    proximal right femur is noted. No    additional fracture deformity. Final   Nondisplaced intertrochanteric fracture on the left. IMPRESSION:      No pneumothorax. Diffusely prominent interstitial markings presumably representing chronic interstitial lung disease. No prior for comparison. Normal cardiomediastinal silhouette. Presumed old fracture deformity of the proximal left humerus. If there is left shoulder pain then dedicated radiographs are indicated. Assessment/Plan: This is a 79 yo Female, with history of bilateral breast cancer, s/p left mastectomy 1975, right lumpectomy and radiation in 1999, DVT 2009 while on [place], hypothyroidism, esophageal spasm, schatzki's ring, EGD with dilation 1/2011, GERD, hepatitis B, OA, spinal stenosis, Rt Hip Fracture s/p surgery 2/2010, Thoracic and Lumbar compression fractures, who presents for evaluation after fall. She was found to have sustained mildly displaced comminuted intratrochanteric fracture of the left femur. Patient is alert, oriented, and coherent.   Daughter in-law, Samime, is present and assists with clinical history. This morning patient walked from her bedroom to the bathroom. When she reached the bathroom, she felt lightheaded. States that she must have turned around, lost her balance, and fell onto her left side. After falling she was in significant pain and was unable to rise on her own. Patient has a  who comes every morning to walk her dog at ~ 730 AM.    Knowing that her  was coming, patient screamed out for help. Reportedly she was found likely on her left side on the bathroom floor. Her  called EMS and patient was taken to [facility] for evaluation. Patient has been in her usual state of health. She walks about 1 mile daily without assistance. She used to walk longer distances. Patient denies recent fevers, chills, cough, shortness of breath, chest pain, palpitations at rest and on exertion. She denies history of nausea, vomiting, abdominal pain, dysuria, diarrhea. She eats three meals daily. States that she does not eat much during lunchtime. Daughter in-law, Stacia Diaz said patient had another fall in February 2022 while walking her dog; she was found on the side walk. After, she has been working with PT/OT to work on her strength and stability. She may have fallen a couple of times after. Active Hospital Problems    Diagnosis Date Noted    Other fracture of left femur, initial encounter for closed fracture [facility]) [S72.8X2A] 01/12/2023     Priority: Medium     Encephalopathy, Likely Post-op Delirium  - became confused on 1/14 and pulled out IV's. - had to place in soft restraints for safety. - in the setting of post-op and elderly age. Adequate hydration and pain control.  - mental clear and oriented x 3 on 1/15. New Onset Atrial Fibrillation with RVR on 1//14  - RVR with HR to 160's and became hypotensive to SBP 70's in the setting of severe AS  - Received 1L NS bolus.   - Received amiodarone bolus 150 mg (1/14), then started infusion of 1 mg/min for 6 hours, followed by 0.5 mg/min. -- she converted back NSR after amiodarone bolus and start of 1mg/min infusion. -- consider anticoagulation in the setting valvular disease, however, she is 81 yo. The onset of atrial fibrillation, converted to NSR < 48 hours. - Monitor and discuss with family. Severe Aortic Stenosis, Symptomatic  Borderline BP  LV with Grade II Diastolic Dysfunction  Mild to Moderate Mitral Regurgitation  Mild to Moderate Mitral Stenosis  - ECHO 1/12/23 showed normal LV cavity size, wall thickness, LVEF 60-65%, no regional wall motion abnormalities. Grade II diastolic dysfunction. Mitral valve leaflets are thickened/calcified. MAC. Mild to moderate MR and MS. Severe aortic stenosis with peak velocity of 5.3 m/s, mean pressure gradient of 66 mmHg. Mild TR. PSAP 48 mmHg. - Reports lightheadedness on rising. When she fell on 1/12 -- when she walked to the bathroom she was not initially lightheaded; after reaching the bathroom she felt lightheaded. She denies shortness of breath, chest pain, palpitations on exertion. - cardiology consulted for consideration of TAVR. Patient extremely functional; walks 1 mile daily without assistive device. Mildly Displaced Comminuted Intra-trochanteric Fracture of Left Femur  - CT A/P with contrast 1/12/23 showed mildly displaced comminuted intratrochanteric fracture of the left femur. Surrounding stranding/hematoma in the left thigh as detailed above. - s/p ORIF of left femoral fracture with CMN on 1/12/23  - monitor for post-op complications. - Pain control -- sensitive to narcotics. - Bowel regimen. Recurrent Falls  Lightheadedness  - walks 1 mile daily without assistive device. - Had a fall in February 2022 while walking her dog.  - she may have fallen a couple more times after, then today 1/12. -- prior to falling today -- she felt lightheaded, turned and lost her balance resulting in her fall.     -- adm labs showed elevated lactic acid and ECHO showed severe aortic stenosis. - CT of Head wo contrast 1/12 showed no acute intracranial process. - CT Cervical Spine wo contrast 1/12 showed no acute traumatic abnormality of the cervical spine. Stable multilevel degenerative disc disease and facet arthropathy. Stable slight rotatory subluxation of C1 on C2 on the right side.  - B12, folate, vitamin D5OH. Elevated Lactic Acid  - adm lactic acid 3.0.  - Administered 2 L LR, then started LR infusion 75 cc/hr. - lactic acid normalized after receiving IVF's. Small Right Pleural Effusion  Trace Left Pleural Effusion  - in the setting of valvular disease and LV with grade II diastolic dysfunction,    Urinary Retention  - bladder scan showed > 500 cc urine.    - marrero placed on 1/12. Hypothyroidism  - Continue home levothyroxine 112 mcg daily.  - TSH, free T4 (pharmacy verified that she has not filled her prescription in several months.)     Remote Compression Deformities at T12, L1  New Compression Deformity at T11  OA  - vitamin D25OH level. History of Rt Hip Fracture   - s/p surgery 2/2010     History of bilateral breast cancer  - s/p left mastectomy 1975  - s/p right lumpectomy and radiation in 1999     DVT 2009   - thought possible related to Evista  - DVT ppx. Moderate Hiatal Hernia  GERD  History of Esophageal spasm  History of Schatzki's ring  - S/p EGD with dilation 1/2011  - Continue home omeprazole 40 mg daily. History of Hepatitis B       DVT Prophylaxis:   Diet: ADULT ORAL NUTRITION SUPPLEMENT; Breakfast, Lunch, Dinner; Standard High Calorie/High Protein Oral Supplement  ADULT DIET; Regular; Low Fat/Low Chol/High Fiber/2 gm Na  Code Status: Full Code    PT/OT Eval Status: PT/OT    Dispo - once medically stable.     Sandra Gunter MD

## 2023-01-16 NOTE — PROGRESS NOTES
Physical Therapy  Facility/Department: [address]  Physical Therapy Daily Treatment Note    Name: Frantz Dyer  : 1930  MRN: [id number]  Date of Service: 2023    Discharge Recommendations:    Frantz Dyer scored a 7/24 on the AM-PAC short mobility form. Current research shows that an AM-PAC score of 17 or less is typically not associated with a discharge to the patient's home setting. Based on the patient's AM-PAC score and their current functional mobility deficits, it is recommended that the patient have 3-5 sessions per week of Physical Therapy at d/c to increase the patient's independence. Please see assessment section for further patient specific details. PT Equipment Recommendations  Equipment Needed: No (defer to next level of care)      Patient Diagnosis(es): The encounter diagnosis was Closed fracture of left hip, initial encounter ([address] Past Medical History:  has a past medical history of Cancer ([address] Hepatitis, Hyperlipidemia, and Osteoarthritis. Past Surgical History:  has a past surgical history that includes Mastectomy; Breast lumpectomy;  section; and hip surgery (Left, 2023). Assessment   Assessment: Patient tolerated session fair with mobility being limited due to pain, weakness and overall decreased activity tolerance. Patient anxious regarding mobility and the potention for left hip pain. She requires increased time to complete all tasks and continuous verbal cues for encouragement, safety and sequencing. Patient able to transfer to bedside chair with use of merry stedy and assist x 2 for bed mobility and sit<>stand transfers. Patient functioning well below baseline level. Recommend skilled PT upon discharge. Will follow while in acute care setting to maximize independence with mobility.   Treatment Diagnosis: decreased functional mobility  Requires PT Follow-Up: Yes  Activity Tolerance  Activity Tolerance: Patient limited by fatigue;Patient limited by endurance; Patient limited by pain     Plan   Physcial Therapy Plan  General Plan: 5-7 times per week  Current Treatment Recommendations: Transfer training, Functional mobility training, Gait training, Strengthening  Safety Devices  Type of Devices: Call light within reach, Left in chair, Chair alarm in place, Nurse notified, Gait belt     Restrictions  Position Activity Restriction  Other position/activity restrictions: WBAT     Subjective   Pain: pain in left hip, not rated, increased with mobility; RN provided with pain medication prior to treatment and patient repositioned for comfort at end of session  General  Chart Reviewed: Yes  Patient assessed for rehabilitation services?: Yes  Additional Pertinent Hx: Patient presents to ED post fall d/t loss of balance in her home. No acute findings on CT head and neck. Xray reveals intertrochanteric fracture of L femur and chronic interstitial lung disease. CT of chest/abdomen reveals pleural effusion bilaterally, stable compression fractures of thoracic spine, and hiatal hernia. PMH: OA, hyperlipidemia, breast cancer, hepatitis  Family / Caregiver Present: Yes (daughter in law)  Diagnosis: other fracture of left femur, initial encounter for closed fracture  Follows Commands: Within Functional Limits  General Comment  Comments: Patient supine in bed upon arrival.  Subjective  Subjective: Patient agreeable to PT treatment with encouragement. \"I'm so afraid. Don't push or pull on me because it will hurt. \"  Patient anxious regarding mobility and pain. Cognition   Orientation  Overall Orientation Status: Within Functional Limits  Cognition  Overall Cognitive Status: Exceptions  Following Commands: Follows one step commands with repetition; Follows one step commands with increased time  Attention Span: Attends with cues to redirect  Safety Judgement: Decreased awareness of need for assistance;Decreased awareness of need for safety  Insights: Decreased awareness of deficits  Initiation: Requires cues for all  Sequencing: Requires cues for some  Cognition Comment: anxious about pain, verbal cues for encouragement, re-assurance and motivation provided throughout     Objective                          Bed mobility  Supine to Sit: Maximum assistance;2 Person assistance (head of bed elevated; increased time to complete tasks as patient fearful)  Bed Mobility Comments: patient sitting up in bedside chair at end of session  Transfers  Sit to Stand: Maximum Assistance;2 Person Assistance (from EOB to [address] stedy and from [address] stedy paddles)  Stand to Sit: 2 Person Assistance;Maximum Assistance (to bedside chair, poor eccentric control)  Bed to Chair: Dependent/Total (via merry stedy)        Balance  Sitting - Static: Fair;+ (SBA to sit EOB)  Standing - Static: Poor (max assist x 2)           OutComes Score                                                  AM-PAC Score  AM-PAC Inpatient Mobility Raw Score : 7 (01/16/23 1524)  AM-PAC Inpatient T-Scale Score : 26.42 (01/16/23 1524)  Mobility Inpatient CMS 0-100% Score: 92.36 (01/16/23 1524)  Mobility Inpatient CMS G-Code Modifier : CM (01/16/23 1524)          Tinneti Score       Goals  Short Term Goals  Time Frame for Short Term Goals: discharge - all ongoing 1/16  Short Term Goal 1: supine to sit modA x1  Short Term Goal 2: sit to stand with TW modAx1  Short Term Goal 3: bed to chair transfer with RW modA x1  Short Term Goal 4: ambulate 15ft with RW mod A x1  Patient Goals   Patient Goals : not stated       Education  Patient Education  Education Given To: Patient  Education Provided: Role of Therapy  Education Method: Demonstration  Barriers to Learning: None  Education Outcome: Continued education needed      Therapy Time   Individual Concurrent Group Co-treatment   Time In 1430         Time Out 1500         Minutes 30         Timed Code Treatment Minutes: 30 Minutes     If patient discharges prior to next session this note will serve as a discharge summary. Please see below for the latest assessment towards goals.     Anthony JONES [facility].

## 2023-01-17 ENCOUNTER — APPOINTMENT (OUTPATIENT)
Dept: GENERAL RADIOLOGY | Age: 88
DRG: 480 | End: 2023-01-17
Payer: MEDICARE

## 2023-01-17 ENCOUNTER — APPOINTMENT (OUTPATIENT)
Dept: VASCULAR LAB | Age: 88
DRG: 480 | End: 2023-01-17
Payer: MEDICARE

## 2023-01-17 LAB
A/G RATIO: 1.2 (ref 1.1–2.2)
ALBUMIN SERPL-MCNC: 3 G/DL (ref 3.4–5)
ALP BLD-CCNC: 79 U/L (ref 40–129)
ALT SERPL-CCNC: 15 U/L (ref 10–40)
ANION GAP SERPL CALCULATED.3IONS-SCNC: 11 MMOL/L (ref 3–16)
AST SERPL-CCNC: 22 U/L (ref 15–37)
BACTERIA: ABNORMAL /HPF
BASOPHILS ABSOLUTE: 0 K/UL (ref 0–0.2)
BASOPHILS RELATIVE PERCENT: 0 %
BILIRUB SERPL-MCNC: 1.1 MG/DL (ref 0–1)
BILIRUBIN URINE: ABNORMAL
BLOOD, URINE: NEGATIVE
BUN BLDV-MCNC: 20 MG/DL (ref 7–20)
CALCIUM SERPL-MCNC: 8.8 MG/DL (ref 8.3–10.6)
CHLORIDE BLD-SCNC: 101 MMOL/L (ref 99–110)
CLARITY: CLEAR
CO2: 22 MMOL/L (ref 21–32)
COLOR: ABNORMAL
CREAT SERPL-MCNC: 0.8 MG/DL (ref 0.6–1.2)
CRYSTALS, UA: ABNORMAL /HPF
EOSINOPHILS ABSOLUTE: 0 K/UL (ref 0–0.6)
EOSINOPHILS RELATIVE PERCENT: 0 %
EPITHELIAL CELLS, UA: ABNORMAL /HPF (ref 0–5)
GFR SERPL CREATININE-BSD FRML MDRD: >60 ML/MIN/{1.73_M2}
GLUCOSE BLD-MCNC: 113 MG/DL (ref 70–99)
GLUCOSE URINE: NEGATIVE MG/DL
HCT VFR BLD CALC: 27.5 % (ref 36–48)
HEMOGLOBIN: 9.3 G/DL (ref 12–16)
KETONES, URINE: ABNORMAL MG/DL
LEUKOCYTE ESTERASE, URINE: NEGATIVE
LYMPHOCYTES ABSOLUTE: 0 K/UL (ref 1–5.1)
LYMPHOCYTES RELATIVE PERCENT: 0 %
MAGNESIUM: 1.7 MG/DL (ref 1.8–2.4)
MCH RBC QN AUTO: 31.5 PG (ref 26–34)
MCHC RBC AUTO-ENTMCNC: 33.9 G/DL (ref 31–36)
MCV RBC AUTO: 93 FL (ref 80–100)
MICROSCOPIC EXAMINATION: YES
MONOCYTES ABSOLUTE: 0.3 K/UL (ref 0–1.3)
MONOCYTES RELATIVE PERCENT: 2 %
MUCUS: ABNORMAL /LPF
NEUTROPHILS ABSOLUTE: 13 K/UL (ref 1.7–7.7)
NEUTROPHILS RELATIVE PERCENT: 98 %
NITRITE, URINE: NEGATIVE
PDW BLD-RTO: 14.1 % (ref 12.4–15.4)
PH UA: 6 (ref 5–8)
PHOSPHORUS: 4.2 MG/DL (ref 2.5–4.9)
PLATELET # BLD: 206 K/UL (ref 135–450)
PMV BLD AUTO: 7.8 FL (ref 5–10.5)
POTASSIUM SERPL-SCNC: 4.6 MMOL/L (ref 3.5–5.1)
PROCALCITONIN: 0.41 NG/ML (ref 0–0.15)
PROTEIN UA: ABNORMAL MG/DL
RBC # BLD: 2.96 M/UL (ref 4–5.2)
RBC # BLD: NORMAL 10*6/UL
RBC UA: ABNORMAL /HPF (ref 0–4)
SODIUM BLD-SCNC: 134 MMOL/L (ref 136–145)
SPECIFIC GRAVITY UA: 1.02 (ref 1–1.03)
TOTAL PROTEIN: 5.6 G/DL (ref 6.4–8.2)
URINE REFLEX TO CULTURE: ABNORMAL
URINE TYPE: ABNORMAL
UROBILINOGEN, URINE: 4 E.U./DL
WBC # BLD: 13.3 K/UL (ref 4–11)
WBC UA: ABNORMAL /HPF (ref 0–5)

## 2023-01-17 PROCEDURE — 71045 X-RAY EXAM CHEST 1 VIEW: CPT

## 2023-01-17 PROCEDURE — 83735 ASSAY OF MAGNESIUM: CPT

## 2023-01-17 PROCEDURE — 2580000003 HC RX 258: Performed by: INTERNAL MEDICINE

## 2023-01-17 PROCEDURE — 6360000002 HC RX W HCPCS: Performed by: INTERNAL MEDICINE

## 2023-01-17 PROCEDURE — 99232 SBSQ HOSP IP/OBS MODERATE 35: CPT | Performed by: PHYSICAL MEDICINE & REHABILITATION

## 2023-01-17 PROCEDURE — 51702 INSERT TEMP BLADDER CATH: CPT

## 2023-01-17 PROCEDURE — 93970 EXTREMITY STUDY: CPT

## 2023-01-17 PROCEDURE — 87040 BLOOD CULTURE FOR BACTERIA: CPT

## 2023-01-17 PROCEDURE — 6370000000 HC RX 637 (ALT 250 FOR IP): Performed by: INTERNAL MEDICINE

## 2023-01-17 PROCEDURE — 80053 COMPREHEN METABOLIC PANEL: CPT

## 2023-01-17 PROCEDURE — 2580000003 HC RX 258: Performed by: STUDENT IN AN ORGANIZED HEALTH CARE EDUCATION/TRAINING PROGRAM

## 2023-01-17 PROCEDURE — 6370000000 HC RX 637 (ALT 250 FOR IP): Performed by: STUDENT IN AN ORGANIZED HEALTH CARE EDUCATION/TRAINING PROGRAM

## 2023-01-17 PROCEDURE — 84145 PROCALCITONIN (PCT): CPT

## 2023-01-17 PROCEDURE — 84100 ASSAY OF PHOSPHORUS: CPT

## 2023-01-17 PROCEDURE — 81001 URINALYSIS AUTO W/SCOPE: CPT

## 2023-01-17 PROCEDURE — 36415 COLL VENOUS BLD VENIPUNCTURE: CPT

## 2023-01-17 PROCEDURE — 1200000000 HC SEMI PRIVATE

## 2023-01-17 PROCEDURE — 85025 COMPLETE CBC W/AUTO DIFF WBC: CPT

## 2023-01-17 RX ORDER — ASPIRIN 81 MG/1
81 TABLET ORAL DAILY
Status: DISCONTINUED | OUTPATIENT
Start: 2023-01-17 | End: 2023-01-23 | Stop reason: HOSPADM

## 2023-01-17 RX ORDER — SODIUM CHLORIDE, SODIUM LACTATE, POTASSIUM CHLORIDE, CALCIUM CHLORIDE 600; 310; 30; 20 MG/100ML; MG/100ML; MG/100ML; MG/100ML
INJECTION, SOLUTION INTRAVENOUS CONTINUOUS
Status: DISCONTINUED | OUTPATIENT
Start: 2023-01-17 | End: 2023-01-23 | Stop reason: HOSPADM

## 2023-01-17 RX ORDER — MAGNESIUM SULFATE IN WATER 40 MG/ML
2000 INJECTION, SOLUTION INTRAVENOUS ONCE
Status: COMPLETED | OUTPATIENT
Start: 2023-01-17 | End: 2023-01-17

## 2023-01-17 RX ADMIN — ATORVASTATIN CALCIUM 10 MG: 10 TABLET, FILM COATED ORAL at 22:19

## 2023-01-17 RX ADMIN — MAGNESIUM SULFATE HEPTAHYDRATE 2000 MG: 2 INJECTION, SOLUTION INTRAVENOUS at 17:15

## 2023-01-17 RX ADMIN — HEPARIN SODIUM 5000 UNITS: 5000 INJECTION INTRAVENOUS; SUBCUTANEOUS at 05:52

## 2023-01-17 RX ADMIN — SODIUM CHLORIDE, POTASSIUM CHLORIDE, SODIUM LACTATE AND CALCIUM CHLORIDE: 600; 310; 30; 20 INJECTION, SOLUTION INTRAVENOUS at 17:15

## 2023-01-17 RX ADMIN — ACETAMINOPHEN 650 MG: 650 SUPPOSITORY RECTAL at 10:49

## 2023-01-17 RX ADMIN — HEPARIN SODIUM 5000 UNITS: 5000 INJECTION INTRAVENOUS; SUBCUTANEOUS at 14:53

## 2023-01-17 RX ADMIN — LEVOTHYROXINE SODIUM 112 MCG: 0.11 TABLET ORAL at 05:53

## 2023-01-17 RX ADMIN — HEPARIN SODIUM 5000 UNITS: 5000 INJECTION INTRAVENOUS; SUBCUTANEOUS at 22:20

## 2023-01-17 RX ADMIN — SODIUM CHLORIDE, PRESERVATIVE FREE 10 ML: 5 INJECTION INTRAVENOUS at 09:01

## 2023-01-17 RX ADMIN — OLANZAPINE 5 MG: 5 TABLET, FILM COATED ORAL at 22:20

## 2023-01-17 RX ADMIN — KETOROLAC TROMETHAMINE 15 MG: 15 INJECTION, SOLUTION INTRAMUSCULAR; INTRAVENOUS at 05:52

## 2023-01-17 RX ADMIN — GABAPENTIN 100 MG: 100 CAPSULE ORAL at 22:20

## 2023-01-17 RX ADMIN — SODIUM CHLORIDE, PRESERVATIVE FREE 10 ML: 5 INJECTION INTRAVENOUS at 09:02

## 2023-01-17 RX ADMIN — ACETAMINOPHEN 650 MG: 325 TABLET ORAL at 00:23

## 2023-01-17 RX ADMIN — SODIUM CHLORIDE, PRESERVATIVE FREE 10 ML: 5 INJECTION INTRAVENOUS at 22:20

## 2023-01-17 RX ADMIN — OXYCODONE HYDROCHLORIDE 10 MG: 5 TABLET ORAL at 03:14

## 2023-01-17 RX ADMIN — PANTOPRAZOLE SODIUM 40 MG: 40 TABLET, DELAYED RELEASE ORAL at 05:53

## 2023-01-17 RX ADMIN — VANCOMYCIN HYDROCHLORIDE 1250 MG: 10 INJECTION, POWDER, LYOPHILIZED, FOR SOLUTION INTRAVENOUS at 13:43

## 2023-01-17 RX ADMIN — ACETAMINOPHEN 650 MG: 325 TABLET ORAL at 22:19

## 2023-01-17 RX ADMIN — CEFEPIME HYDROCHLORIDE 2000 MG: 2 INJECTION, POWDER, FOR SOLUTION INTRAMUSCULAR; INTRAVENOUS at 12:02

## 2023-01-17 ASSESSMENT — PAIN SCALES - GENERAL: PAINLEVEL_OUTOF10: 10

## 2023-01-17 ASSESSMENT — PAIN - FUNCTIONAL ASSESSMENT: PAIN_FUNCTIONAL_ASSESSMENT: PREVENTS OR INTERFERES SOME ACTIVE ACTIVITIES AND ADLS

## 2023-01-17 ASSESSMENT — PAIN DESCRIPTION - ONSET: ONSET: ON-GOING

## 2023-01-17 ASSESSMENT — PAIN DESCRIPTION - PAIN TYPE: TYPE: ACUTE PAIN;SURGICAL PAIN

## 2023-01-17 ASSESSMENT — PAIN DESCRIPTION - DESCRIPTORS: DESCRIPTORS: ACHING

## 2023-01-17 ASSESSMENT — PAIN DESCRIPTION - FREQUENCY: FREQUENCY: CONTINUOUS

## 2023-01-17 ASSESSMENT — PAIN DESCRIPTION - LOCATION: LOCATION: HIP

## 2023-01-17 ASSESSMENT — PAIN DESCRIPTION - ORIENTATION: ORIENTATION: LEFT

## 2023-01-17 NOTE — PLAN OF CARE
Problem: Musculoskeletal - Adult  Goal: Return mobility to safest level of function  1/16/2023 2158 by Ashley Morrell RN  Outcome: Progressing  1/16/2023 0804 by Cirilo Pedroza RN  Outcome: Progressing  1/16/2023 0803 by Cirilo Pedroza RN  Outcome: Progressing  Goal: Maintain proper alignment of affected body part  1/16/2023 2158 by Ashley Morrell RN  Outcome: Progressing  1/16/2023 0804 by Cirilo Pedroza RN  Outcome: Progressing  1/16/2023 0803 by Cirilo Pedroza RN  Outcome: Progressing  Goal: Return ADL status to a safe level of function  1/16/2023 2158 by Ashley Morrell RN  Outcome: Progressing  1/16/2023 0804 by Cirilo Pedroza RN  Outcome: Progressing  1/16/2023 0803 by Cirilo Pedroza RN  Outcome: Progressing     Problem: Safety - Adult  Goal: Free from fall injury  1/16/2023 2158 by Ashley Morrell RN  Outcome: Progressing  1/16/2023 0804 by Cirilo Pedroza RN  Outcome: Progressing  1/16/2023 0803 by Cirilo Pedroza RN  Outcome: Progressing     Problem: Discharge Planning  Goal: Discharge to home or other facility with appropriate resources  1/16/2023 2158 by Ashley Morrell RN  Outcome: Progressing  1/16/2023 0804 by Cirilo Pedroza RN  Outcome: Progressing  1/16/2023 0803 by Cirilo Pedroza RN  Outcome: Progressing     Problem: ABCDS Injury Assessment  Goal: Absence of physical injury  1/16/2023 2158 by Ashley Morrell RN  Outcome: Progressing  1/16/2023 0804 by Cirilo Pedroza RN  Outcome: Progressing  1/16/2023 0803 by Cirilo Pedroza RN  Outcome: Progressing     Problem: Pain  Goal: Verbalizes/displays adequate comfort level or baseline comfort level  1/16/2023 2158 by Ashley Morrell RN  Outcome: Progressing  1/16/2023 0804 by Cirilo Pedroza RN  Outcome: Progressing  1/16/2023 0803 by Cirilo Pedroza RN  Outcome: Progressing     Problem: Skin/Tissue Integrity  Goal: Absence of new skin breakdown  Description: 1. Monitor for areas of redness and/or skin breakdown  2. Assess vascular access sites hourly  3. Every 4-6 hours minimum:  Change oxygen saturation probe site  4. Every 4-6 hours:  If on nasal continuous positive airway pressure, respiratory therapy assess nares and determine need for appliance change or resting period.   1/16/2023 2158 by Aliyah Pal RN  Outcome: Progressing  1/16/2023 0804 by Anaya Alatorre RN  Outcome: Progressing  1/16/2023 0803 by Anaya Alatorre RN  Outcome: Progressing

## 2023-01-17 NOTE — PROGRESS NOTES
Orthopaedic Surgery Progress Note:    Gerri Carson is a 80 y.o. female now s/p Left femur IMN 1/12/2023     S:   Pt seen and examined  Pain controlled  Resting comfortably           O:   Vitals:    01/17/23 0346   BP: 107/66   Pulse: 100   Resp: 16   Temp: 98.2 °F (36.8 °C)   SpO2: 93%       GEN: Alert, resting in bed, NAD  HEENT: NCAT  CV: normotensive  RESP: non-labored breathing    LLE:   Dressing CDI, no hematoma or drainage   NO TTP knee, tibia, ankle  Intact TA/EHL/GS  SILT L2-S1  Cap Refill< 2 sec             Labs:    Lab Results   Component Value Date    WBC 4.2 01/16/2023    HGB 7.6 (L) 01/16/2023    HCT 23.2 (L) 01/16/2023    MCV 95.3 01/16/2023     (L) 01/16/2023       Lab Results   Component Value Date     (L) 01/16/2023    K 4.2 01/16/2023    CO2 20 (L) 01/16/2023     01/16/2023    BUN 16 01/16/2023    CREATININE 0.8 01/16/2023           Intake/Output Summary (Last 24 hours) at 1/17/2023 0616  Last data filed at 1/17/2023 0549  Gross per 24 hour   Intake 240 ml   Output 500 ml   Net -260 ml           A: Gerri Carson is a 80 y.o. female now s/p Left femur IMN 1/12/2023       P:         Activity:  PT/OT   ROM: AT  Antibiotics: Ancef post-op x 2 doses   Weight Bearing Status: WBAT LLE  Pain: multimodal   Diet: ADAT  marrero: per urology   DVT PPx: OK for chemical DVT PPX   Imaging: none  Follow-up: 2 weeks  Dispo: pending pain control, PT/OT, per primary         Swati Devries MD  Orthopaedic Spine Surgery  [facility]  O: [phone]  C: [phone]  E: [email or web address]

## 2023-01-17 NOTE — PROGRESS NOTES
Pt is too lethargic to take her oral medication. Pt has also spiked a fever of 102. 3. Dr notified and tylenol suppository given. Will continue to monitor.

## 2023-01-17 NOTE — PROGRESS NOTES
Pt woke and started talking in an oriented manner spontaneously. Mag and LR started per  order. Will continue to monitor.

## 2023-01-17 NOTE — PROGRESS NOTES
Hospitalist Progress Note      PCP: Lizet Espinoza    Date of Admission: 1/12/2023    Chief Complaint: worsening lethargy      Subjective: In normal sinus rhythm. Borderline BP. She denies lightheadedness, shortness of breath, chest pain, palpitations. Her left thigh and leg pain is controlled on oxycodone and gabapentin. She denies nausea, abdominal pain, dysuria, diarrhea.       Medications:  Reviewed    Infusion Medications    sodium chloride 25 mL/hr at 01/13/23 0932     Scheduled Medications    amiodarone  200 mg Oral Daily    metoprolol succinate  12.5 mg Oral Daily    heparin (porcine)  5,000 Units SubCUTAneous 3 times per day    OLANZapine  5 mg Oral Nightly    lidocaine  2 patch TransDERmal Daily    gabapentin  100 mg Oral TID    sodium chloride flush  5-40 mL IntraVENous 2 times per day    levothyroxine  112 mcg Oral QAM AC    atorvastatin  10 mg Oral Nightly    pantoprazole  40 mg Oral QAM AC    midazolam  0.5 mg IntraVENous Once    sodium chloride flush  5-40 mL IntraVENous 2 times per day     PRN Meds: ketorolac, perflutren lipid microspheres, sodium chloride flush, ondansetron **OR** ondansetron, polyethylene glycol, acetaminophen **OR** acetaminophen, clonazePAM, oxyCODONE **OR** oxyCODONE, simethicone, sodium chloride flush, sodium chloride      Intake/Output Summary (Last 24 hours) at 1/17/2023 0349  Last data filed at 1/16/2023 2000  Gross per 24 hour   Intake 120 ml   Output --   Net 120 ml         Physical Exam Performed:    /66   Pulse 98   Temp 99.4 °F (37.4 °C) (Oral)   Resp 16   Ht 5' 1\" (1.549 m)   Wt 145 lb 4.5 oz (65.9 kg)   SpO2 96%   BMI 27.45 kg/m²     GEN alert, in distressed about her pain  HEENT normocephalic, anicteric sclera, EOMI, mucosa moist, no stridor  NECK supple, trachea midline  RESP on RA, in no distress, clear to auscultation  CARDS RRR, S1, S2, systolic murmurs over RUSB, LUSB, left upper extremity edema, left thigh edema, radial pulse 2+, DP pulse 2+  ABD distended, +BS, soft nontender  MSK left hip tenderness, no cyanosis, no clubbing  SKIN warm, dry  NEURO alert, oriented x 3, no facial asymmetry, no dysarthria, moving spontaneously  PSYCH normal mood      Labs:   Recent Labs     01/16/23  1226   WBC 4.2   HGB 7.6*   HCT 23.2*   *       Recent Labs     01/16/23  1226   *   K 4.2      CO2 20*   BUN 16   CREATININE 0.8   CALCIUM 8.3   PHOS 3.9       Recent Labs     01/16/23  1226   AST 29   ALT 15   BILITOT 0.6   ALKPHOS 47       No results for input(s): INR in the last 72 hours. Recent Labs     01/15/23  1543 01/15/23  2134 01/16/23  0809   TROPONINI 0.16* 0.19* 0.17*         Urinalysis:      Lab Results   Component Value Date/Time    NITRU Negative 01/13/2023 03:16 AM    WBCUA 0-2 01/13/2023 03:16 AM    RBCUA  01/13/2023 03:16 AM    BLOODU MODERATE 01/13/2023 03:16 AM    SPECGRAV 1.025 01/13/2023 03:16 AM    GLUCOSEU Negative 01/13/2023 03:16 AM       Radiology:  CT HEAD WO CONTRAST   Final Result      Chronic ischemic changes of the brain and atrophy with no midline shift or hydrocephalus or definitive hemorrhage. Extensive artifact noted likely from patient motion            XR TIBIA FIBULA LEFT (2 VIEWS)   Final Result      Normal radiographs of the left tibia and fibula. Knee compartment narrowing appreciated. Left ankle 3 view:      HISTORY: Trauma:      FINDINGS:      AP lateral oblique views demonstrate normal alignment.     There is no acute fracture or dislocation      IMPRESSION:      No acute fracture       LEFT HIP 2 VIEW , AP view pelvis      HISTORY: [facility] with hip pain, previous left hip pinning      PROCEDURE: AP view the pelvis and lateral view of left hip were obtained      COMPARISON: Intraoperative studies from January 12, 2023      FINDINGS:      There is normal alignment of the right hip with 4 screws noted traversing the trochanter femoral neck and head from previous fracture fixation without significant displacement, unchanged. Left trochanteric hip fixation is intact with transfixation screws through the trochanter femoral neck and long intramedullary amaris remaining in stable position compared to recent study. No discrete acute fracture or dislocation. No expansile cystic or destructive change identified      AP view of the pelvis demonstrates no acute bony defect. Diffuse demineralization noted. Degenerative changes lower lumbar spine. The sacroiliac margins appear normal.    There is some minimal sclerosis along the acetabulum      IMPRESSION:      No acute deformity involving the right or left hip. Previous pinning of the right hip is unchanged. Previous fracture fixation of the left trochanteric and hip is intact      No acute bony defect in the pelvis. Diffuse demineralization            XR SHOULDER LEFT (MIN 2 VIEWS)   Final Result      Chronic advanced arthropathy of the left shoulder with loose bodies and subarticular erosions affecting the humeral head and advanced glenohumeral arthritic change and remodeling         XR ANKLE LEFT (2 VIEWS)   Final Result      Normal radiographs of the left tibia and fibula. Knee compartment narrowing appreciated. Left ankle 3 view:      HISTORY: Trauma:      FINDINGS:      AP lateral oblique views demonstrate normal alignment. There is no acute fracture or dislocation      IMPRESSION:      No acute fracture       LEFT HIP 2 VIEW , AP view pelvis      HISTORY: Vanessa Escobar with hip pain, previous left hip pinning      PROCEDURE: AP view the pelvis and lateral view of left hip were obtained      COMPARISON: Intraoperative studies from January 12, 2023      FINDINGS:      There is normal alignment of the right hip with 4 screws noted traversing the trochanter femoral neck and head from previous fracture fixation without significant displacement, unchanged.       Left trochanteric hip fixation is intact with transfixation screws through the trochanter femoral neck and long intramedullary amaris remaining in stable position compared to recent study. No discrete acute fracture or dislocation. No expansile cystic or destructive change identified      AP view of the pelvis demonstrates no acute bony defect. Diffuse demineralization noted. Degenerative changes lower lumbar spine. The sacroiliac margins appear normal.    There is some minimal sclerosis along the acetabulum      IMPRESSION:      No acute deformity involving the right or left hip. Previous pinning of the right hip is unchanged. Previous fracture fixation of the left trochanteric and hip is intact      No acute bony defect in the pelvis. Diffuse demineralization            XR KNEE RIGHT (1-2 VIEWS)   Final Result      1. Right knee: Advanced arthritic change as described above. No sign of any fracture or acute deformity. 2. Left knee: Moderate degenerative change particularly involving the lateral compartment with some prepatellar soft tissue swelling. No acute defect      XR HIP 2-3 VW W PELVIS LEFT   Final Result      Normal radiographs of the left tibia and fibula. Knee compartment narrowing appreciated. Left ankle 3 view:      HISTORY: Trauma:      FINDINGS:      AP lateral oblique views demonstrate normal alignment. There is no acute fracture or dislocation      IMPRESSION:      No acute fracture       LEFT HIP 2 VIEW , AP view pelvis      HISTORY: [facility] with hip pain, previous left hip pinning      PROCEDURE: AP view the pelvis and lateral view of left hip were obtained      COMPARISON: Intraoperative studies from January 12, 2023      FINDINGS:      There is normal alignment of the right hip with 4 screws noted traversing the trochanter femoral neck and head from previous fracture fixation without significant displacement, unchanged.       Left trochanteric hip fixation is intact with transfixation screws through the trochanter femoral neck and long intramedullary amaris remaining in stable position compared to recent study. No discrete acute fracture or dislocation. No expansile cystic or destructive change identified      AP view of the pelvis demonstrates no acute bony defect. Diffuse demineralization noted. Degenerative changes lower lumbar spine. The sacroiliac margins appear normal.    There is some minimal sclerosis along the acetabulum      IMPRESSION:      No acute deformity involving the right or left hip. Previous pinning of the right hip is unchanged. Previous fracture fixation of the left trochanteric and hip is intact      No acute bony defect in the pelvis. Diffuse demineralization            XR KNEE LEFT (1-2 VIEWS)   Final Result      1. Right knee: Advanced arthritic change as described above. No sign of any fracture or acute deformity. 2. Left knee: Moderate degenerative change particularly involving the lateral compartment with some prepatellar soft tissue swelling. No acute defect      XR HIP LEFT (2-3 VIEWS)   Final Result      Intraoperative fluoroscopy for open reduction internal fixation of the left hip. Refer to the operative note for details. FLUORO FOR SURGICAL PROCEDURES   Final Result      Intraoperative fluoroscopy for open reduction internal fixation of the left hip. Refer to the operative note for details. CT CHEST ABDOMEN PELVIS W CONTRAST Additional Contrast? None   Final Result      CHEST:   1.  Small right pleural effusion and trace left pleural effusion. Mild bibasilar atelectasis. 2.  Stable remote compression deformities at T12 and L1 compared to prior MRI from 2015. There is also new compression deformity at T11 compared to the study, but this is age indeterminant and may also be remote. Recommend correlation with point    tenderness in this level. 3.  Motion limited exam without definite evidence of acute traumatic intrathoracic abnormality. 4.  Moderate hiatal hernia.       ABDOMEN AND PELVIS:   1.  Mildly displaced comminuted intratrochanteric fracture of the left femur. 2.  Surrounding stranding/hematoma in the left thigh as detailed above. 3.  No additional acute traumatic abnormality identified in the abdomen and pelvis per         CT CERVICAL SPINE WO CONTRAST   Final Result   1. No acute traumatic abnormality of the cervical spine. 2. Stable multilevel degenerative disc disease and facet arthropathy. 3. Stable slight rotatory subluxation of C1 on C2 on the right side. CT HEAD WO CONTRAST   Final Result      1. Stable exam with no acute intracranial abnormality. XR FEMUR LEFT (MIN 2 VIEWS)   Final Result   Addendum (preliminary) 1 of 1   [ADDENDUM #1   Addendum: Initial report by Dr. Kamila Valentine. Addendum by Dr. Ki Brar. Frontal view of the pelvis and frontal and frog-leg lateral views of the    left femur were obtained. There is a nondisplaced intertrochanteric    fracture of the left femur. No dislocation. Orthopedic hardware of the    proximal right femur is noted. No    additional fracture deformity. Final   Nondisplaced intertrochanteric fracture on the left. IMPRESSION:      No pneumothorax. Diffusely prominent interstitial markings presumably representing chronic interstitial lung disease. No prior for comparison. Normal cardiomediastinal silhouette. Presumed old fracture deformity of the proximal left humerus. If there is left shoulder pain then dedicated radiographs are indicated. XR PELVIS (1-2 VIEWS)   Final Result   Addendum (preliminary) 1 of 1   [ ADDENDUM #1 *   Addendum: Initial report by Dr. Kamila Valentine. Addendum by Dr. Ki Brar. Frontal view of the pelvis and frontal and frog-leg lateral views of the    left femur were obtained. There is a nondisplaced intertrochanteric    fracture of the left femur. No dislocation. Orthopedic hardware of the    proximal right femur is noted. No    additional fracture deformity. Final   Nondisplaced intertrochanteric fracture on the left. IMPRESSION:      No pneumothorax. Diffusely prominent interstitial markings presumably representing chronic interstitial lung disease. No prior for comparison. Normal cardiomediastinal silhouette. Presumed old fracture deformity of the proximal left humerus. If there is left shoulder pain then dedicated radiographs are indicated. XR CHEST PORTABLE   Final Result   Addendum (preliminary) 1 of 1   [ADDENDUM #1    Addendum: Initial report by Dr. Vidal Herndon. Addendum by Dr. Aaron Montejo. Frontal view of the pelvis and frontal and frog-leg lateral views of the    left femur were obtained. There is a nondisplaced intertrochanteric    fracture of the left femur. No dislocation. Orthopedic hardware of the    proximal right femur is noted. No    additional fracture deformity. Final   Nondisplaced intertrochanteric fracture on the left. IMPRESSION:      No pneumothorax. Diffusely prominent interstitial markings presumably representing chronic interstitial lung disease. No prior for comparison. Normal cardiomediastinal silhouette. Presumed old fracture deformity of the proximal left humerus. If there is left shoulder pain then dedicated radiographs are indicated. Assessment/Plan: This is a 79 yo Female, with history of bilateral breast cancer, s/p left mastectomy 1975, right lumpectomy and radiation in 1999, DVT 2009 while on Evista, hypothyroidism, esophageal spasm, schatzki's ring, EGD with dilation 1/2011, GERD, hepatitis B, OA, spinal stenosis, Rt Hip Fracture s/p surgery 2/2010, Thoracic and Lumbar compression fractures, who presents for evaluation after fall. She was found to have sustained mildly displaced comminuted intratrochanteric fracture of the left femur. Patient is alert, oriented, and coherent. Daughter in-law, Drew Solo, is present and assists with clinical history.        This morning patient walked from her bedroom to the bathroom. When she reached the bathroom, she felt lightheaded. States that she must have turned around, lost her balance, and fell onto her left side. After falling she was in significant pain and was unable to rise on her own. Patient has a  who comes every morning to walk her dog at ~ 730 AM.    Knowing that her  was coming, patient screamed out for help. Reportedly she was found likely on her left side on the bathroom floor. Her  called EMS and patient was taken to [facility], [facility]. for evaluation. Patient has been in her usual state of health. She walks about 1 mile daily without assistance. She used to walk longer distances. Patient denies recent fevers, chills, cough, shortness of breath, chest pain, palpitations at rest and on exertion. She denies history of nausea, vomiting, abdominal pain, dysuria, diarrhea. She eats three meals daily. States that she does not eat much during lunchtime. Daughter in-law, Antione Nicholas said patient had another fall in February 2022 while walking her dog; she was found on the side walk. After, she has been working with PT/OT to work on her strength and stability. She may have fallen a couple of times after. Active Hospital Problems    Diagnosis Date Noted    Closed fracture of left hip ([facility] [address] [S72.002A] 01/16/2023     Priority: Medium    Other fracture of left femur, initial encounter for closed fracture [facility]) [S72.8X2A] 01/12/2023     Priority: Medium     Encephalopathy, Likely Post-op Delirium  - became confused on 1/14 and pulled out IV's. - had to place in soft restraints for safety. - in the setting of post-op and elderly age. Adequate hydration and pain control.  - mental clear and oriented x 3 on 1/15. New Onset Atrial Fibrillation with RVR on 1//14  - RVR with HR to 160's and became hypotensive to SBP 70's in the setting of severe AS  - Received 1L NS bolus.   - Received amiodarone bolus 150 mg (1/14), then started infusion of 1 mg/min for 6 hours, followed by 0.5 mg/min. -- she converted back NSR after amiodarone bolus and start of 1mg/min infusion. -- consider anticoagulation in the setting valvular disease, however, she is 81 yo. The onset of atrial fibrillation, converted to NSR < 48 hours. - Monitor and discuss with family. Severe Aortic Stenosis, Symptomatic  Borderline BP  LV with Grade II Diastolic Dysfunction  Mild to Moderate Mitral Regurgitation  Mild to Moderate Mitral Stenosis  - ECHO 1/12/23 showed normal LV cavity size, wall thickness, LVEF 60-65%, no regional wall motion abnormalities. Grade II diastolic dysfunction. Mitral valve leaflets are thickened/calcified. MAC. Mild to moderate MR and MS. Severe aortic stenosis with peak velocity of 5.3 m/s, mean pressure gradient of 66 mmHg. Mild TR. PSAP 48 mmHg. - Reports lightheadedness on rising. When she fell on 1/12 -- when she walked to the bathroom she was not initially lightheaded; after reaching the bathroom she felt lightheaded. She denies shortness of breath, chest pain, palpitations on exertion. - cardiology consulted for consideration of TAVR. Patient extremely functional; walks 1 mile daily without assistive device. Mildly Displaced Comminuted Intra-trochanteric Fracture of Left Femur  - CT A/P with contrast 1/12/23 showed mildly displaced comminuted intratrochanteric fracture of the left femur. Surrounding stranding/hematoma in the left thigh as detailed above. - s/p ORIF of left femoral fracture with CMN on 1/12/23  - monitor for post-op complications. - Pain control -- sensitive to narcotics. - Bowel regimen. Recurrent Falls  Lightheadedness  - walks 1 mile daily without assistive device. - Had a fall in February 2022 while walking her dog.  - she may have fallen a couple more times after, then today 1/12.   -- prior to falling today -- she felt lightheaded, turned and lost her balance resulting in her fall. -- adm labs showed elevated lactic acid and ECHO showed severe aortic stenosis. - CT of Head wo contrast 1/12 showed no acute intracranial process. - CT Cervical Spine wo contrast 1/12 showed no acute traumatic abnormality of the cervical spine. Stable multilevel degenerative disc disease and facet arthropathy. Stable slight rotatory subluxation of C1 on C2 on the right side.  - B12, folate, vitamin D5OH. Elevated Lactic Acid  - adm lactic acid 3.0.  - Administered 2 L LR, then started LR infusion 75 cc/hr. - lactic acid normalized after receiving IVF's. Small Right Pleural Effusion  Trace Left Pleural Effusion  - in the setting of valvular disease and LV with grade II diastolic dysfunction,    Urinary Retention  - bladder scan showed > 500 cc urine.    - marrero placed on 1/12. Hypothyroidism  - Continue home levothyroxine 112 mcg daily.  - TSH, free T4 (pharmacy verified that she has not filled her prescription in several months.)     Remote Compression Deformities at T12, L1  New Compression Deformity at T11  OA  - vitamin D25OH level. History of Rt Hip Fracture   - s/p surgery 2/2010     History of bilateral breast cancer  - s/p left mastectomy 1975  - s/p right lumpectomy and radiation in 1999     DVT 2009   - thought possible related to Evista  - DVT ppx. Moderate Hiatal Hernia  GERD  History of Esophageal spasm  History of Schatzki's ring  - S/p EGD with dilation 1/2011  - Continue home omeprazole 40 mg daily. History of Hepatitis B       DVT Prophylaxis:   Diet: ADULT ORAL NUTRITION SUPPLEMENT; Breakfast, Lunch, Dinner; Standard High Calorie/High Protein Oral Supplement  ADULT DIET; Regular; Low Fat/Low Chol/High Fiber/2 gm Na  Code Status: Full Code    PT/OT Eval Status: PT/OT    Dispo - once medically stable.     Juancarlos Donald MD

## 2023-01-17 NOTE — CONSULTS
Clinical Pharmacy Progress Note    Vancomycin - Management by Pharmacy    Consult Date(s): 1/17/23  Consulting Provider(s): Dr. Katrin Betts / Plan  1)  Post-op fevers - Vancomycin  Concurrent Antimicrobials: Cefepime  Day of Vanc Therapy:  1  Current Dosing Method: Bayesian-Guided AUC Dosing  Therapeutic Goal: -600 mg/L*hr  Current Dose / Plan: Will start 1250mg IV q24h. Regimen predicts an AUC = 555 with steady-state trough = 16.9mcg/mL. Will plan to check level in ~48 hrs (or sooner if clinically indicated). Will continue to monitor clinical condition and make adjustments to regimen as appropriate. Please call with questions--  Thanks--  Temitope Ramirez, PharmD, BCPS, BCGP  [id number] ([facility])   1/17/2023 12:23 PM      Interval update:  Temp to 102.3 this AM.  Blood cultures ordered and broad spectrum antibiotics started. Subjective/Objective:   Marianne Oliver is a 80 y.o. female with a PMHx significant for HLD, OA, DVT, hypothyroidism, spinal stenosis, and b/l breast cancer who is admitted with left femur fracture. Pt is s/p left hip intertrochoanteric nailing (done 1/12/23). Post-op course has included treatment for encephalopathy / post-op delirium, new onset A.fib with RVR, and urinary retention. Pharmacy is consulted to dose Vancomycin    Ht Readings from Last 1 Encounters:   01/12/23 5' 1\" (1.549 m)     Wt Readings from Last 1 Encounters:   01/15/23 145 lb 4.5 oz (65.9 kg)     Current & Prior Antimicrobial Regimen(s):   (1/12-1/13)  Cefepime 2000mg EI q12h (1/17-current)  Vancomycin - Pharmacy to dose  1250mg IV q24h (1/17-current)    Vancomycin Level(s) / Doses:    Date Time Dose Type of Level / Level Interpretation                 Note: Serum levels collected for AUC-based dosing may be high if collected in close proximity to the dose administered. This is not necessarily indicative of toxicity.     Cultures & Sensitivities:    Date Site Micro Susceptibility / Result 1/17 Blood x2 sent            Recent Labs     01/16/23  1226 01/17/23  1110   CREATININE 0.8 0.8   BUN 16 20   WBC 4.2 13.3*       Estimated Creatinine Clearance: 39 mL/min (based on SCr of 0.8 mg/dL).     Additional Lab Values / Findings of Note:    Recent Labs     01/17/23  1110   PROCAL 0.41*

## 2023-01-17 NOTE — PROGRESS NOTES
Department of Physical Medicine & Rehabilitation  Progress Note    Patient Identification:  Divina Arias  [id number]  : 1930  Admit date: 2023    Chief Complaint: Other fracture of left femur, initial encounter for closed fracture ([address]    Subjective:   No acute events overnight. Patient seen this am. Patient very lethargic this morning. Febrile up to 102.3 as well as hypotensive and tachycardic. ROS: Unable to provide ROS secondary to lethargy    Objective:  Patient Vitals for the past 24 hrs:   BP Temp Temp src Pulse Resp SpO2   23 0730 (!) 94/59 98.3 °F (36.8 °C) Oral (!) 101 16 92 %   23 0346 107/66 98.2 °F (36.8 °C) Oral 100 16 93 %   23 0002 100/66 99.4 °F (37.4 °C) Oral 98 16 96 %   23 2000 (!) 101/58 98.1 °F (36.7 °C) Oral 88 16 92 %   23 1906 117/65 98.1 °F (36.7 °C) Oral 99 16 --   23 1740 104/64 97.4 °F (36.3 °C) Oral 89 17 --   23 1126 (!) 94/57 98.1 °F (36.7 °C) Axillary 86 16 96 %     Const: Lethargic. No distress  HEENT: Normocephalic, atraumatic. Normal sclera/conjunctiva. MMM. CV: Regular rate and rhythm. Systolic murmur, decreased S1. Resp: No respiratory distress. Lungs CTAB. Abd: Soft, nontender, nondistended, NABS+   Ext: No edema. Neuro: Very lethargic this morning, not responding to questions. Difficult to arouse. Psych: Difficult to arouse    Laboratory data: Available via EMR.    Last 24 hour lab  Recent Results (from the past 24 hour(s))   Comprehensive Metabolic Panel    Collection Time: 23 12:26 PM   Result Value Ref Range    Sodium 133 (L) 136 - 145 mmol/L    Potassium 4.2 3.5 - 5.1 mmol/L    Chloride 103 99 - 110 mmol/L    CO2 20 (L) 21 - 32 mmol/L    Anion Gap 10 3 - 16    Glucose 101 (H) 70 - 99 mg/dL    BUN 16 7 - 20 mg/dL    Creatinine 0.8 0.6 - 1.2 mg/dL    Est, Glom Filt Rate >60 >60    Calcium 8.3 8.3 - 10.6 mg/dL    Total Protein 4.7 (L) 6.4 - 8.2 g/dL    Albumin 2.6 (L) 3.4 - 5.0 g/dL    Albumin/Globulin Ratio 1.2 1.1 - 2.2    Total Bilirubin 0.6 0.0 - 1.0 mg/dL    Alkaline Phosphatase 47 40 - 129 U/L    ALT 15 10 - 40 U/L    AST 29 15 - 37 U/L   Magnesium    Collection Time: 01/16/23 12:26 PM   Result Value Ref Range    Magnesium 1.60 (L) 1.80 - 2.40 mg/dL   Phosphorus    Collection Time: 01/16/23 12:26 PM   Result Value Ref Range    Phosphorus 3.9 2.5 - 4.9 mg/dL   CBC with Auto Differential    Collection Time: 01/16/23 12:26 PM   Result Value Ref Range    WBC 4.2 4.0 - 11.0 K/uL    RBC 2.44 (L) 4.00 - 5.20 M/uL    Hemoglobin 7.6 (L) 12.0 - 16.0 g/dL    Hematocrit 23.2 (L) 36.0 - 48.0 %    MCV 95.3 80.0 - 100.0 fL    MCH 31.2 26.0 - 34.0 pg    MCHC 32.7 31.0 - 36.0 g/dL    RDW 14.0 12.4 - 15.4 %    Platelets 223 (L) 865 - 450 K/uL    MPV 8.7 5.0 - 10.5 fL    Neutrophils % 77.1 %    Lymphocytes % 10.8 %    Monocytes % 11.7 %    Eosinophils % 0.0 %    Basophils % 0.4 %    Neutrophils Absolute 3.3 1.7 - 7.7 K/uL    Lymphocytes Absolute 0.5 (L) 1.0 - 5.1 K/uL    Monocytes Absolute 0.5 0.0 - 1.3 K/uL    Eosinophils Absolute 0.0 0.0 - 0.6 K/uL    Basophils Absolute 0.0 0.0 - 0.2 K/uL       Therapy progress:  PT  Position Activity Restriction  Other position/activity restrictions: WBAT  Objective     Sit to Stand: Maximum Assistance, 2 Person Assistance (from EOB to [facility] and from [address] [facility] stedy paddles)  Stand to Sit: 2 Person Assistance, Maximum Assistance (to bedside chair, poor eccentric control)  Bed to Chair: Dependent/Total (via merry stedy)  Device: [address]: Moderate assistance, 2 Person assistance  Distance: took a few steps from bed to chair  OT  PT Equipment Recommendations  Equipment Needed: No (defer to next level of care)  Toilet - Technique: Stand step  Equipment Used: Standard bedside commode  Toilet Transfers Comments: With walker and mod v.cues for tech and weight shift to move BLEs  Assessment        SLP          Body mass index is 27.45 kg/m². Assessment and Plan:  1.  Mildly Displaced Comminuted Intertrochanteric Fracture of Left Femur              -s/p ORIF on 1/12/23              -narcotics for pain control  2. Recurrent Falls, with preceding Lightheadedness              -Walks 1 mile per day              -History of falls, preceded by lightheadedness  3. Aortic Stenosis      Mitral Regurgitation      Mitral Stenosis              -ECHO 1/12/23 showed normal LV cavity size, wall thickness, LVEF 60-65%, no regional wall motion abnormalities. Grade II diastolic dysfunction. Mitral valve leaflets are thickened/calcified. MAC. Mild to moderate MR and MS. Severe aortic stenosis with peak velocity of 5.3 m/s, mean pressure gradient of 66 mmHg. Mild TR. PSAP 48 mmHg. - cardiology consulted for consideration of TAVR. Patient extremely functional; walks 1 mile daily without assistive device. Impairments- Decreased functional mobility, Decreased ADLs     Recommendations:     Refusing ARU. Temp today. Likely will need SNF- will sign off for now. Thank you for this consult. Please contact me with any questions or concerns. Roberto Mckeon, DO  Internal Medicine PGY-2  1/17/2023  9:34 AM       This patient has been seen, examined, and discussed with the resident. I was part of the key critical services provided to the patient. I agree with the residents documentation. This note may have been altered to reflect my own examination findings, impression, and recommendations.        Amando Chao D.O. M.P.H  PM&R  1/17/2023  11:55 AM

## 2023-01-17 NOTE — PROGRESS NOTES
Reason for Admission: fx L femur     Major Shift Events: Pt out of restraints since day shift. Pt refuses turns, educated on the benefits of movement for the L hip. Pain managed with PRN medications. Pt slept well during shift, can wake up slightly confused but easily reoriented.      Systems Review   Neuro:    A&O: x4   NIHSS: 0  Sedation/RASS   RASS: 0   Pain: 7/10     Cardiac   Rhythm: NSR   Gtts: N/A    Pulmonary   O2 Modality: 2L NC     GI/    Last BM: SHARIF   I/O:    01/15 0700   01/16 0659 01/16 0700   01/17 0659   P.O. (mL/kg/hr) 500 (0.3) 240 (0.2)   Total Intake(mL/kg) 500 (7.6) 240 (3.6)   Urine (mL/kg/hr) 550 (0.3) 500 (0.3)   Total Output(mL/kg) 550 (8.3) 500 (7.6)   Net -50 -260      NPO/PO/TF/TPN/rate(goal): PO    Hematology   Blood Products: N/A   VTE Prophylaxis/Anticoagulation: Heparin    H/H:    Latest Reference Range & Units Most Recent   Hemoglobin Quant 12.0 - 16.0 g/dL 7.6 (L)  1/16/23 12:26   Hematocrit 36.0 - 48.0 % 23.2 (L)  1/16/23 12:26   (L): Data is abnormally low    Infectious Disease   Culture results/pending: N/A   ABX: None   Isolation: None    Skin: scattered bruising, surgical incision L thigh, swollen     Lines/tubes: PIV 22 R FA, PIV 22 R AC, marrero    Lab or unit collect: Lab    Mobility    PT/OT: upx2 with steady

## 2023-01-17 NOTE — PROGRESS NOTES
Physical Therapy/Occupational Therapy  No Treatment  Attempted to see patient at 1350. Patient unable to participate in therapy d/t symptoms from potential infection. Patient found supine in bed and unable to arouse. RN in agreement that patient is unable to do therapy today. Will follow-up 1/18 as schedule permits.     Jimena Hayward  MS, OTR/L #[id number]

## 2023-01-18 LAB
A/G RATIO: 1.1 (ref 1.1–2.2)
ALBUMIN SERPL-MCNC: 2.7 G/DL (ref 3.4–5)
ALP BLD-CCNC: 77 U/L (ref 40–129)
ALT SERPL-CCNC: 11 U/L (ref 10–40)
ANION GAP SERPL CALCULATED.3IONS-SCNC: 7 MMOL/L (ref 3–16)
AST SERPL-CCNC: 16 U/L (ref 15–37)
BASOPHILS ABSOLUTE: 0.1 K/UL (ref 0–0.2)
BASOPHILS RELATIVE PERCENT: 1 %
BILIRUB SERPL-MCNC: 1.1 MG/DL (ref 0–1)
BUN BLDV-MCNC: 18 MG/DL (ref 7–20)
CALCIUM SERPL-MCNC: 8.7 MG/DL (ref 8.3–10.6)
CHLORIDE BLD-SCNC: 101 MMOL/L (ref 99–110)
CO2: 24 MMOL/L (ref 21–32)
CREAT SERPL-MCNC: 0.7 MG/DL (ref 0.6–1.2)
EOSINOPHILS ABSOLUTE: 0 K/UL (ref 0–0.6)
EOSINOPHILS RELATIVE PERCENT: 0.1 %
GFR SERPL CREATININE-BSD FRML MDRD: >60 ML/MIN/{1.73_M2}
GLUCOSE BLD-MCNC: 146 MG/DL (ref 70–99)
HCT VFR BLD CALC: 24.1 % (ref 36–48)
HEMOGLOBIN: 8.4 G/DL (ref 12–16)
LYMPHOCYTES ABSOLUTE: 0.3 K/UL (ref 1–5.1)
LYMPHOCYTES RELATIVE PERCENT: 4.6 %
MAGNESIUM: 1.9 MG/DL (ref 1.8–2.4)
MCH RBC QN AUTO: 32.2 PG (ref 26–34)
MCHC RBC AUTO-ENTMCNC: 34.9 G/DL (ref 31–36)
MCV RBC AUTO: 92.3 FL (ref 80–100)
MONOCYTES ABSOLUTE: 0.6 K/UL (ref 0–1.3)
MONOCYTES RELATIVE PERCENT: 8.4 %
NEUTROPHILS ABSOLUTE: 6.4 K/UL (ref 1.7–7.7)
NEUTROPHILS RELATIVE PERCENT: 85.9 %
PDW BLD-RTO: 14.3 % (ref 12.4–15.4)
PHOSPHORUS: 3.1 MG/DL (ref 2.5–4.9)
PLATELET # BLD: 150 K/UL (ref 135–450)
PMV BLD AUTO: 8.1 FL (ref 5–10.5)
POTASSIUM SERPL-SCNC: 4.2 MMOL/L (ref 3.5–5.1)
RBC # BLD: 2.61 M/UL (ref 4–5.2)
SODIUM BLD-SCNC: 132 MMOL/L (ref 136–145)
TOTAL PROTEIN: 5.2 G/DL (ref 6.4–8.2)
WBC # BLD: 7.4 K/UL (ref 4–11)

## 2023-01-18 PROCEDURE — 97535 SELF CARE MNGMENT TRAINING: CPT

## 2023-01-18 PROCEDURE — 6370000000 HC RX 637 (ALT 250 FOR IP): Performed by: STUDENT IN AN ORGANIZED HEALTH CARE EDUCATION/TRAINING PROGRAM

## 2023-01-18 PROCEDURE — 97530 THERAPEUTIC ACTIVITIES: CPT

## 2023-01-18 PROCEDURE — 6370000000 HC RX 637 (ALT 250 FOR IP): Performed by: INTERNAL MEDICINE

## 2023-01-18 PROCEDURE — 80053 COMPREHEN METABOLIC PANEL: CPT

## 2023-01-18 PROCEDURE — 2580000003 HC RX 258: Performed by: STUDENT IN AN ORGANIZED HEALTH CARE EDUCATION/TRAINING PROGRAM

## 2023-01-18 PROCEDURE — 6360000002 HC RX W HCPCS: Performed by: INTERNAL MEDICINE

## 2023-01-18 PROCEDURE — 36415 COLL VENOUS BLD VENIPUNCTURE: CPT

## 2023-01-18 PROCEDURE — C1751 CATH, INF, PER/CENT/MIDLINE: HCPCS

## 2023-01-18 PROCEDURE — 1200000000 HC SEMI PRIVATE

## 2023-01-18 PROCEDURE — 2580000003 HC RX 258: Performed by: INTERNAL MEDICINE

## 2023-01-18 PROCEDURE — 85025 COMPLETE CBC W/AUTO DIFF WBC: CPT

## 2023-01-18 PROCEDURE — 83735 ASSAY OF MAGNESIUM: CPT

## 2023-01-18 PROCEDURE — 36569 INSJ PICC 5 YR+ W/O IMAGING: CPT

## 2023-01-18 PROCEDURE — 84100 ASSAY OF PHOSPHORUS: CPT

## 2023-01-18 RX ORDER — FUROSEMIDE 10 MG/ML
20 INJECTION INTRAMUSCULAR; INTRAVENOUS ONCE
Status: COMPLETED | OUTPATIENT
Start: 2023-01-18 | End: 2023-01-18

## 2023-01-18 RX ORDER — MAGNESIUM SULFATE 1 G/100ML
1000 INJECTION INTRAVENOUS ONCE
Status: COMPLETED | OUTPATIENT
Start: 2023-01-18 | End: 2023-01-18

## 2023-01-18 RX ADMIN — GABAPENTIN 100 MG: 100 CAPSULE ORAL at 08:17

## 2023-01-18 RX ADMIN — VANCOMYCIN HYDROCHLORIDE 1250 MG: 10 INJECTION, POWDER, LYOPHILIZED, FOR SOLUTION INTRAVENOUS at 16:18

## 2023-01-18 RX ADMIN — ASPIRIN 81 MG: 81 TABLET, COATED ORAL at 08:18

## 2023-01-18 RX ADMIN — GABAPENTIN 100 MG: 100 CAPSULE ORAL at 13:43

## 2023-01-18 RX ADMIN — HEPARIN SODIUM 5000 UNITS: 5000 INJECTION INTRAVENOUS; SUBCUTANEOUS at 20:04

## 2023-01-18 RX ADMIN — CEFEPIME HYDROCHLORIDE 2000 MG: 2 INJECTION, POWDER, FOR SOLUTION INTRAMUSCULAR; INTRAVENOUS at 11:57

## 2023-01-18 RX ADMIN — CEFEPIME HYDROCHLORIDE 2000 MG: 2 INJECTION, POWDER, FOR SOLUTION INTRAMUSCULAR; INTRAVENOUS at 00:42

## 2023-01-18 RX ADMIN — OXYCODONE HYDROCHLORIDE 10 MG: 5 TABLET ORAL at 11:51

## 2023-01-18 RX ADMIN — KETOROLAC TROMETHAMINE 15 MG: 15 INJECTION, SOLUTION INTRAMUSCULAR; INTRAVENOUS at 08:19

## 2023-01-18 RX ADMIN — HEPARIN SODIUM 5000 UNITS: 5000 INJECTION INTRAVENOUS; SUBCUTANEOUS at 13:43

## 2023-01-18 RX ADMIN — CEFEPIME HYDROCHLORIDE 2000 MG: 2 INJECTION, POWDER, FOR SOLUTION INTRAMUSCULAR; INTRAVENOUS at 23:59

## 2023-01-18 RX ADMIN — OLANZAPINE 5 MG: 5 TABLET, FILM COATED ORAL at 20:04

## 2023-01-18 RX ADMIN — GABAPENTIN 100 MG: 100 CAPSULE ORAL at 20:04

## 2023-01-18 RX ADMIN — AMIODARONE HYDROCHLORIDE 200 MG: 200 TABLET ORAL at 08:17

## 2023-01-18 RX ADMIN — SODIUM CHLORIDE, PRESERVATIVE FREE 10 ML: 5 INJECTION INTRAVENOUS at 08:19

## 2023-01-18 RX ADMIN — ATORVASTATIN CALCIUM 10 MG: 10 TABLET, FILM COATED ORAL at 20:04

## 2023-01-18 RX ADMIN — FUROSEMIDE 20 MG: 10 INJECTION, SOLUTION INTRAMUSCULAR; INTRAVENOUS at 16:58

## 2023-01-18 RX ADMIN — MAGNESIUM SULFATE HEPTAHYDRATE 1000 MG: 1 INJECTION, SOLUTION INTRAVENOUS at 12:00

## 2023-01-18 RX ADMIN — SODIUM CHLORIDE, PRESERVATIVE FREE 10 ML: 5 INJECTION INTRAVENOUS at 08:20

## 2023-01-18 ASSESSMENT — PAIN - FUNCTIONAL ASSESSMENT: PAIN_FUNCTIONAL_ASSESSMENT: PREVENTS OR INTERFERES SOME ACTIVE ACTIVITIES AND ADLS

## 2023-01-18 ASSESSMENT — PAIN DESCRIPTION - LOCATION
LOCATION: HIP;LEG
LOCATION: HIP

## 2023-01-18 ASSESSMENT — PAIN DESCRIPTION - DESCRIPTORS
DESCRIPTORS: ACHING
DESCRIPTORS: ACHING;STABBING

## 2023-01-18 ASSESSMENT — PAIN SCALES - GENERAL
PAINLEVEL_OUTOF10: 10
PAINLEVEL_OUTOF10: 1
PAINLEVEL_OUTOF10: 10

## 2023-01-18 ASSESSMENT — PAIN DESCRIPTION - FREQUENCY: FREQUENCY: INTERMITTENT

## 2023-01-18 ASSESSMENT — PAIN DESCRIPTION - PAIN TYPE: TYPE: ACUTE PAIN;SURGICAL PAIN

## 2023-01-18 ASSESSMENT — PAIN DESCRIPTION - ORIENTATION
ORIENTATION: LEFT
ORIENTATION: LEFT

## 2023-01-18 ASSESSMENT — PAIN DESCRIPTION - ONSET: ONSET: OTHER (COMMENT)

## 2023-01-18 NOTE — PROGRESS NOTES
Cardiology Consult Service  Daily Progress Note        Admit Date:  1/12/2023  Primary cardiologist: Dr Christiano Coffman, new    Reason for Consultation/Chief Complaint: PAFRVR, severe AS, elevated troponin, chest pain    Subjective:     Mahad Wilson is a 80 y.o. female with a past medical history of status post left mastectomy 1975, right lumpectomy and radiation 1999, DVT while on at this time, esophageal spasm due to Schatzki's ring status post dilation. Patient presented to the emergency room on 1/12 after she sustained a fall. That morning patient walked from her bedroom to the bathroom. When she reached the bathroom, she felt lightheaded. States that she must have turned around, lost her balance, and fell onto her left side. After falling she was in significant pain and was unable to rise on her own. At the ED, she was found to have a left femoral fracture. She had a nail placed on 1/12/2022, there were no complications perioperatively. On 1/14/2022 response was called at 6:30 AM because of complaints of severe chest pain. She was found to be in A. fib with RVR, rate 143 bpm with ischemic changes on ECG (of note patient had an ECG on 1/12/2022 which was normal sinus rhythm, normal ECG). He was started on amiodarone drip and converted into normal sinus rhythm; amiodarone drip was stopped 1/15/2023. Troponin peak at 0.19 and trending down. Echo 1/12/2023: Normal LV, LVEF 30%, grade 2 diastolic dysfunction, mild to moderate MS and MR, severe AS (peak velocity 5.3 m/s, mean pressure gradient 66 mmHg). Cardiology was consulted today for further evaluation. Patient reports no previous cardiac diagnosis. She lives independently and likes to take her dog out for a walk frequently. She denies any exertional or congestive symptoms. Tele shows NSR. He is low normal.  Albumin 2.6, hemoglobin 7.6 down from 9. Interval history: There were no overnight events or complaints. Tele shows NSR. Objective:     Medications:   [START ON 1/18/2023] cefepime  2,000 mg IntraVENous Q12H    vancomycin  1,250 mg IntraVENous Q24H    aspirin  81 mg Oral Daily    amiodarone  200 mg Oral Daily    [Held by provider] metoprolol succinate  12.5 mg Oral Daily    heparin (porcine)  5,000 Units SubCUTAneous 3 times per day    OLANZapine  5 mg Oral Nightly    lidocaine  2 patch TransDERmal Daily    gabapentin  100 mg Oral TID    sodium chloride flush  5-40 mL IntraVENous 2 times per day    levothyroxine  112 mcg Oral QAM AC    atorvastatin  10 mg Oral Nightly    pantoprazole  40 mg Oral QAM AC    midazolam  0.5 mg IntraVENous Once    sodium chloride flush  5-40 mL IntraVENous 2 times per day       IV drips:   lactated ringers 75 mL/hr at 01/17/23 1715    sodium chloride 25 mL/hr at 01/13/23 0932       PRN:  ketorolac, perflutren lipid microspheres, sodium chloride flush, ondansetron **OR** ondansetron, polyethylene glycol, acetaminophen **OR** acetaminophen, clonazePAM, oxyCODONE **OR** oxyCODONE, simethicone, sodium chloride flush, sodium chloride    Vitals:    01/17/23 1114 01/17/23 1115 01/17/23 1800 01/17/23 2203   BP: 109/64  98/62 (!) 93/49   Pulse: (!) 108  97 99   Resp: 16  16 16   Temp: 100 °F (37.8 °C)  98.3 °F (36.8 °C) 99.5 °F (37.5 °C)   TempSrc: Axillary  Oral Oral   SpO2: (!) 88% 92%  95%   Weight:       Height:           Intake/Output Summary (Last 24 hours) at 1/17/2023 2258  Last data filed at 1/17/2023 0549  Gross per 24 hour   Intake 120 ml   Output 500 ml   Net -380 ml     I/O last 3 completed shifts:   In: 240 [P.O.:240]  Out: 500 [Urine:500]  Wt Readings from Last 3 Encounters:   01/15/23 145 lb 4.5 oz (65.9 kg)   08/29/19 136 lb 12.8 oz (62.1 kg)   07/05/18 140 lb (63.5 kg)       Admit Wt: Weight: 147 lb 0.8 oz (66.7 kg)   Todays Wt: Weight: 145 lb 4.5 oz (65.9 kg)    Physical Exam:         General Appearance:  Alert, cooperative, no distress, appears stated age Appropriate weight   Head: Normocephalic, without obvious abnormality, atraumatic   Eyes:  PERRL, conjunctiva/corneas clear EOM intact  Ears normal   Throat no lesions       Nose: Nares normal, no drainage or sinus tenderness   Throat: Lips, mucosa, and tongue normal   Neck: Supple, symmetrical, trachea midline, no adenopathy, thyroid: not enlarged, symmetric, no tenderness/mass/nodules, no carotid bruit. Lungs:   Normal respiratory rate, lungs clear to auscultation without any wheezes, rubs or ronchi bilaterally. Chest Wall:  No tenderness or deformity   Heart:  Regular rhythm, rate is controlled, S1, S2 normal, there is II/VI RUSB ELAN, there is no rub or gallop, cannot assess jvd, no bilateral lower extremity edema   Abdomen:   Soft, non-tender, bowel sounds active all four quadrants,  no masses, no organomegaly       Extremities: Extremities normal, atraumatic, no cyanosis. Pulses: 2+ and symmetric   Skin: Skin color, texture, turgor normal, no rashes or lesions   Pysch: Normal mood and affect   Neurologic: Normal gross motor and sensory exam.  Cranial nerves intact       Labs:   Recent Labs     01/16/23  1226 01/17/23  1110   * 134*   K 4.2 4.6   BUN 16 20   CREATININE 0.8 0.8    101   CO2 20* 22   GLUCOSE 101* 113*   CALCIUM 8.3 8.8   MG 1.60* 1.70*     Recent Labs     01/16/23  1226 01/17/23  1110   WBC 4.2 13.3*   HGB 7.6* 9.3*   HCT 23.2* 27.5*   * 206   MCV 95.3 93.0     No results for input(s): CHOLTOT, TRIG, HDL, CHOLHDL, LDL in the last 72 hours. Invalid input(s): Meagan Tulia  No results for input(s): PTT, INR in the last 72 hours. Invalid input(s): PT  Recent Labs     01/15/23  0036 01/15/23  0720 01/15/23  1543 01/15/23  2134 01/16/23  0809   TROPONINI 0.18* 0.16* 0.16* 0.19* 0.17*     No results for input(s): BNP in the last 72 hours. No results for input(s): NTPROBNP in the last 72 hours. No results for input(s): TSH in the last 72 hours.     Imaging:       Assessment & Plan: 1. PAF with RVR. Patient converted into normal sinus rhythm with amiodarone drip. 2.  Chest pain in the setting of tachycardia. Patient denies any prior exertional symptoms and denies any chest pain while in sinus rhythm. 3.  Elevated troponin. Borderline elevation of troponins most likely due to demand ischemia in the setting of PAF RVR with hypotension, severe aortic stenosis and possibly significant underlying CAD. 4.  Severe aortic stenosis. 5.  Moderate mitral stenosis with mitral regurgitation  6. Chronic HFpEF. Patient is currently euvolemic and hemodynamically tenuous. - Cw amiodarone 200 mg p.o. daily and increase Toprol to 25 mg daily to prevent further episodes of PAF RVR which will not be tolerated in the setting of severe aortic stenosis. - May consider anticoagulation versus baby aspirin in the near future for stroke prevention; will defer to primary team and orthopedic surgery timing and choice of anticoagulation.  - Outpatient follow-up with cardiology to further discuss treatment options if patient wishes to proceed with TAVR. - If patient develops recurrent AF RVR, please resume amiodarone drip. There are no further recommendations at this time and hence we will now sign off. Patient may follow up in our clinic once discharged from the hospital for further cardiac care. I have personally reviewed the reports and images of labs, radiological studies, cardiac studies including ECG's and telemetry, current and old medical records. The note was completed using EMR and Dragon dictation system. Every effort was made to ensure accuracy; however, inadvertent computerized transcription errors may be present. All questions and concerns were addressed to the patient/family. Alternatives to my treatment were discussed. Thank you for allowing to us to participate in the care or Zion Medina. Please call our service with questions.     Jesús Clifford MD, [facility], [place] [address] [place] [address]  Ph: [phone]  Fax: [phone]

## 2023-01-18 NOTE — PROGRESS NOTES
Physical Therapy  Facility/Department: [address] [place] [address]  Physical Therapy Initial Assessment    Name: Kt Goomdan  : 1930  MRN: [id number]  Date of Service: 2023    Discharge Recommendations: Kt Goodman scored a 8/24 on the AM-PAC short mobility form. Current research shows that an AM-PAC score of 17 or less is typically not associated with a discharge to the patient's home setting. Based on the patient's AM-PAC score and their current functional mobility deficits, it is recommended that the patient have 3-5 sessions per week of Physical Therapy at d/c to increase the patient's independence. Please see assessment section for further patient specific details. If patient discharges prior to next session this note will serve as a discharge summary. Please see below for the latest assessment towards goals. If patient discharges prior to next session this note will serve as a discharge summary. Please see below for the latest assessment towards goals. Patient Diagnosis(es): The encounter diagnosis was Closed fracture of left hip, initial encounter ([facility] [address] Past Medical History:  has a past medical history of Cancer ([facility] [address] Hepatitis, Hyperlipidemia, and Osteoarthritis. Past Surgical History:  has a past surgical history that includes Mastectomy; Breast lumpectomy;  section; and hip surgery (Left, 2023). Assessment   Assessment: Patient is having difficulty tolerating treatment session due to pain, weakness, fear/anxiety. Pain and anxiety seem to be main limiting factors, and patient is showing decreased participation, strength, and mobility as her anxiety continues to impact her tolerance to mobility. Able to complete STS but did not transfer to chair this date due to patient response and pain/fear. Needs continued education on importance of participating in therapy. She will benefit from continued therapy in SNF in order to increase functional independence.  Will continue to follow up with her to continue improving mobility and strength. Treatment Diagnosis: decreased functional mobility  Requires PT Follow-Up: Yes  Activity Tolerance  Activity Tolerance Comments: Patient did not tolerate treatment well. She has high level of anxiety and fear associated with movement. Continues to say people are hurting her. She said \"I want to die\" because of the pain. With a lot of encouragement and education, she is able to tolerate a treatment session. Plan   Physcial Therapy Plan  General Plan: 5-7 times per week  Current Treatment Recommendations: Transfer training, Functional mobility training, Gait training, Strengthening  Safety Devices  Type of Devices: Call light within reach, Nurse notified, Bed alarm in place, Left in bed     Restrictions  Position Activity Restriction  Other position/activity restrictions: WBAT     Subjective   General  Chart Reviewed: Yes  Additional Pertinent Hx: Patient presents to ED post fall d/t loss of balance in her home. No acute findings on CT head and neck. Xray reveals intertrochanteric fracture of L femur and chronic interstitial lung disease. CT of chest/abdomen reveals pleural effusion bilaterally, stable compression fractures of thoracic spine, and hiatal hernia. PMH: OA, hyperlipidemia, breast cancer, hepatitis  Diagnosis: other fracture of left femur, initial encounter for closed fracture  Subjective  Subjective: Patient supine at arrival. Needed a lot of education and encouragement. \"Do not push or pull me, I am not moving, the pain is excruciating. \"         Social/Functional History  Social/Functional History  Lives With: Alone  Type of Home: Condo  Home Layout: One level  Home Access: Stairs to enter with rails  Entrance Stairs - Number of Steps: 5  Entrance Stairs - Rails: Right  Bathroom Shower/Tub: Walk-in shower  Bathroom Toilet: Standard  Bathroom Equipment: Grab bars in shower, Grab bars around toilet  Home Equipment: None  Has the patient had two or more falls in the past year or any fall with injury in the past year?: Yes (says her balance is poor)  Receives Help From: Family  ADL Assistance: [address]: Independent  Ambulation Assistance: Independent  Transfer Assistance: Independent  Active : Yes  Mode of Transportation: Car  Vision/Hearing  Vision  Vision: Within Functional Limits  Hearing  Hearing: Within functional limits    Cognition   Orientation  Overall Orientation Status:  (not formally tested, patient is inconsistent with recall about situation)     Objective   Gross Assessment  AROM: Generally decreased, functional  Strength: Generally decreased, functional     Bed mobility  Supine to Sit: Maximum assistance;2 Person assistance  Bed Mobility Comments: patient yelling through transfer, very fearful and anxious to move, needs assist with BLE and trunk; does not participate in transfer but sits EOB with little assist  Transfers  Sit to Stand: Maximum Assistance;2 Person Assistance (2 STS to remove brief)  Stand to Sit: Maximum Assistance;2 Person Assistance  Bed to Chair: Maximum assistance;2 Person Assistance (simulated stand pivot transfer along edge of bed)        Balance  Sitting - Static: Good  Standing - Static: Poor    AM-PAC Score  AM-PAC Inpatient Mobility Raw Score : 8 (01/18/23 1106)  AM-PAC Inpatient T-Scale Score : 28.52 (01/18/23 1106)  Mobility Inpatient CMS 0-100% Score: 86.62 (01/18/23 1106)  Mobility Inpatient CMS G-Code Modifier : CM (01/18/23 1106)    Goals  Short Term Goals  Time Frame for Short Term Goals: discharge  Short Term Goal 1: supine to sit modA x1 - ongoing  Short Term Goal 2: sit to stand with TW modAx1 - ongoing  Short Term Goal 3: bed to chair transfer with RW modA x1 - ongoing  Short Term Goal 4: ambulate 15ft with RW mod A x1 - ongoing  Patient Goals   Patient Goals : not stated       Education  Patient Education  Education Given To: Patient  Education Provided: Role of Therapy  Education Method: Verbal  Barriers to Learning: None  Education Outcome: Continued education needed      Therapy Time   Individual Concurrent Group Co-treatment   Time In 1030         Time Out 1108         Minutes 38          Timed Code Treatment Minutes: 38    Total Treatment Minutes:  [address] SPT    Therapist was present, directed the patient's care, made skilled judgement, and was responsible for assessment and treatment of the patient.   Cam Francia, PT

## 2023-01-18 NOTE — PROGRESS NOTES
Occupational Therapy  Facility/Department: [address]  Occupational Therapy Treatment Note     Name: Marianne Oliver  : 1930  MRN: [id number]  Date of Service: 2023    Discharge Recommendations:Jen Costa scored a  on the AM-PAC ADL Inpatient form. Current research shows that an AM-PAC score of 17 or less is typically not associated with a discharge to the patient's home setting. Based on the patient's AM-PAC score and their current ADL deficits, it is recommended that the patient have 3-5 sessions per week of Occupational Therapy at d/c to increase the patient's independence. Please see assessment section for further patient specific details. If patient discharges prior to next session this note will serve as a discharge summary. Please see below for the latest assessment towards goals. OT Equipment Recommendations  Equipment Needed: No  Other: defer to [facility] care facility       Patient Diagnosis(es): The encounter diagnosis was Closed fracture of left hip, initial encounter ([facility] [address] Past Medical History:  has a past medical history of Cancer ([facility] [address] Hepatitis, Hyperlipidemia, and Osteoarthritis. Past Surgical History:  has a past surgical history that includes Mastectomy; Breast lumpectomy;  section; and hip surgery (Left, 2023). Treatment Diagnosis: impaired ADLs/ functional transfers and mobility 2/2 L femur fx      Assessment   Performance deficits / Impairments: Decreased functional mobility ; Decreased ADL status; Decreased safe awareness;Decreased endurance  Assessment: Pt with ongoing deficits 2/2 L hip fx s/p IM nailing. Pt needing Max /Dep A x 2 for all mobility / stance attempts. Pt resistive at times and requires emotional support and encouragement throughout session. Pt would benefit from ongoing inpt OT at d/c to maximize functional level. Will follow as inpt.   Treatment Diagnosis: impaired ADLs/ functional transfers and mobility 2/2 L femur fx  Prognosis: Fair  REQUIRES OT FOLLOW-UP: Yes  Activity Tolerance  Activity Tolerance: Patient limited by fatigue  Activity Tolerance Comments: Anxious and yelling out at times. Pt with slight DOUGLAS on 3L o2. Plan   Occupational Therapy Plan  Times Per Week: 5-7x  Times Per Day: Once a day  Current Treatment Recommendations: Safety education & training, Self-Care / ADL, Patient/Caregiver education & training, Endurance training, Functional mobility training     Restrictions  Position Activity Restriction  Other position/activity restrictions: WBAT    Subjective   General  Chart Reviewed: Yes  Patient assessed for rehabilitation services?: Yes  Additional Pertinent Hx: Admit 1/12 with fall + L femur fx on films    ortho consult to OR 1/12 s/p LEFT HIP INTERTROCHANTERIC NAILING                                            PMHX: bilateral breast cancer, s/p left mastectomy 1975, right lumpectomy and radiation in 1999, DVT,hypothyroidism, esophageal spasm, Schatzki's ring, GERD, hepatitis B, OA, spinal stenosis, Rt Hip Fx 2/2010, Thoracic and Lumbar compression fractures  Family / Caregiver Present: Yes (daughter in law at the end of session.)  Diagnosis: L Femur fx s/p LEFT HIP INTERTROCHANTERIC NAILING  Subjective  Subjective: \" I can't , I can't,\"' Pt very anxious and needing max encouragement to participate in therapy.      Social/Functional History  Social/Functional History  Lives With: Alone  Type of Home: Condo  Home Layout: One level  Home Access: Stairs to enter with rails  Entrance Stairs - Number of Steps: 5  Entrance Stairs - Rails: Right  Bathroom Shower/Tub: Walk-in shower  Bathroom Toilet: Standard  Bathroom Equipment: Grab bars in shower, Grab bars around toilet  Home Equipment: None  Has the patient had two or more falls in the past year or any fall with injury in the past year?: Yes (says her balance is poor)  Receives Help From: Family  ADL Assistance: [facility] [address]: Independent  Ambulation Assistance: Independent  Transfer Assistance: Independent  Active : Yes  Mode of Transportation: Car       Objective     Safety Devices  Type of Devices: Call light within reach;Nurse notified; Bed alarm in place; Left in bed     Toilet Transfers  Toilet - Technique: Stand step  Equipment Used: Standard bedside commode  Toilet Transfer: Dependent/Total;Maximum assistance;2 Person assistance  Toilet Transfers Comments: simulated side step x 3 up to head of bed     ADL  Feeding: Setup; Increased time to complete;Stand by assistance  Grooming: Contact guard assistance;Verbal cueing;Minimal assistance; Increased time to complete  Grooming Skilled Clinical Factors: combing hair -limited by tangles / knots  LE Dressing: Dependent/Total  LE Dressing Skilled Clinical Factors: with socks  Toileting: Dependent/Total  Toileting Skilled Clinical Factors: has marrero cathter / incont of stool - LIVAN Tesfaye. Activity Tolerance  Activity Tolerance Comments: Patient did not tolerate treatment well. She has high level of anxiety and fear associated with movement. Continues to say people are hurting her. She said \"I want to die\" because of the pain. With a lot of encouragement and education, she is able to tolerate a treatment session.   Bed mobility  Supine to Sit: Dependent/Total;Maximum assistance;2 Person assistance  Sit to Supine: Dependent/Total;Maximum assistance;2 Person assistance  Scooting: Maximal assistance  Bed Mobility Comments: Pt resistive to sitting up - tolerated sitting x 15 mins at EOB with distraction / attempts for combing hair  Transfers  Sit to stand: Maximum assistance;2 Person assistance;Dependent/Total  Stand to sit: Maximum assistance;Dependent/Total;2 Person assistance  Transfer Comments: pt anxious and fearful -needing max encouragement / cues - Two attempts  Vision  Vision: Within Functional Limits  Hearing  Hearing: Within functional limits  Cognition  Overall Cognitive Status: Exceptions  Arousal/Alertness: Delayed responses to stimuli  Following Commands: Follows one step commands with repetition  Attention Span: Difficulty attending to directions  Memory: Decreased short term memory;Decreased recall of recent events;Decreased recall of precautions  Safety Judgement: Decreased awareness of need for safety;Decreased awareness of need for assistance  Insights: Decreased awareness of deficits  Initiation: Requires cues for all  Sequencing: Requires cues for all  Cognition Comment: anxious regarding mobility attempts. Needing freq encouragement and reassurance throughout session  Orientation  Overall Orientation Status: Impaired  Orientation Level: Oriented to person;Oriented to place; Disoriented to time;Disoriented to situation        Education Given To: Patient; Family  Education Provided: Role of Therapy;Plan of Care;IADL Safety;Precautions; ADL Adaptive Strategies;Transfer Training  Education Method: Demonstration;Verbal  Barriers to Learning: None  Education Outcome: Continued education needed     AM-PAC Score  AM-PAC Inpatient Daily Activity Raw Score: 11 (01/18/23 1107)  AM-PAC Inpatient ADL T-Scale Score : 29.04 (01/18/23 1107)  ADL Inpatient CMS 0-100% Score: 70.42 (01/18/23 1107)  ADL Inpatient CMS G-Code Modifier : CL (01/18/23 1107)    Goals  Short Term Goals  Time Frame for Short Term Goals: at d/c  Short Term Goal 1: Stance x 4 mins with CGA x 1 for ADLs/ IADLs- not met  Short Term Goal 2: Functional transfers with CGA-- not met  Short Term Goal 3: LE dressing with MIn A and AE prn - not met  Patient Goals   Patient goals : Be able to go home soon     Therapy Time   Individual Concurrent Group Co-treatment   Time In 1029         Time Out 1110         Minutes 41             Timed Code Treatment Minutes:  41 mins     Total Treatment Minutes:  39 mins       Jenny Calderon OT

## 2023-01-18 NOTE — PROGRESS NOTES
Clinical Pharmacy Progress Note    Vancomycin - Management by Pharmacy    Consult Date(s): 1/17/23  Consulting Provider(s): Dr. Holger Wan / Plan  1)  Post-op fevers - Vancomycin  Concurrent Antimicrobials: Cefepime  Day of Vanc Therapy:  2  Current Dosing Method: Bayesian-Guided AUC Dosing  Therapeutic Goal: -600 mg/L*hr  Current Dose / Plan:   Remains on vancomycin 1250 mg IV q24 hours  Renal function appears to be stable, Scr = 0.7 mg/dL  Regimen predicts an AUC = 511 with steady-state trough = 15.1 mcg/mL. Random vancomycin level ordered for tomorrow 1/19 with AM labs to assess kinetics. Will continue to monitor clinical condition and make adjustments to regimen as appropriate. Please call with questions--  Thanks--  Dottie Burks, PharmD, [facility]  [id number]    1/18/2023 10:25 AM      Interval update:  Temp improved today. WBC ct trending down. Subjective/Objective:   Bao Chiang is a 80 y.o. female with a PMHx significant for HLD, OA, DVT, hypothyroidism, spinal stenosis, and b/l breast cancer who is admitted with left femur fracture. Pt is s/p left hip intertrochoanteric nailing (done 1/12/23). Post-op course has included treatment for encephalopathy / post-op delirium, new onset A.fib with RVR, and urinary retention. Pharmacy is consulted to dose Vancomycin    Ht Readings from Last 1 Encounters:   01/12/23 5' 1\" (1.549 m)     Wt Readings from Last 1 Encounters:   01/15/23 145 lb 4.5 oz (65.9 kg)     Current & Prior Antimicrobial Regimen(s):   (1/12-1/13)  Cefepime 2000mg EI q12h (1/17-current)  Vancomycin - Pharmacy to dose  1250mg IV q24h (1/17-current)    Vancomycin Level(s) / Doses:    Date Time Dose Type of Level / Level Interpretation   1/19 0600 1250 mg IV q24h Random = ordered           Note: Serum levels collected for AUC-based dosing may be high if collected in close proximity to the dose administered. This is not necessarily indicative of toxicity.     Cultures & Sensitivities:    Date Site Micro Susceptibility / Result   1/17 Blood x2 sent            Recent Labs     01/16/23  1226 01/17/23  1110 01/18/23  0905   CREATININE 0.8 0.8 0.7   BUN 16 20 18   WBC 4.2 13.3* 7.4         Estimated Creatinine Clearance: 45 mL/min (based on SCr of 0.7 mg/dL).     Additional Lab Values / Findings of Note:    Recent Labs     01/17/23  1110   PROCAL 0.41*

## 2023-01-18 NOTE — PROGRESS NOTES
Hospitalist Progress Note      PCP: Jamshid Izaguirre    Date of Admission: 1/12/2023    Chief Complaint: worsening lethargy      Subjective:     New Fever up to 102.3 F. In sinus tachycardia. Borderline BP. In the morning she was very fatigued and sleeping most of time. Started IV vancomycin, cefepime. Restarted LR infusion at 75 cc/hr. She woke up in the afternoon. She denies lightheadedness, shortness of breath, chest pain, palpitations. Her left thigh and leg pain is controlled on oxycodone and gabapentin. She denies nausea, abdominal pain, dysuria, diarrhea.       Medications:  Reviewed    Infusion Medications    lactated ringers 75 mL/hr at 01/17/23 1715    sodium chloride 25 mL/hr at 01/13/23 0932     Scheduled Medications    cefepime  2,000 mg IntraVENous Q12H    vancomycin  1,250 mg IntraVENous Q24H    aspirin  81 mg Oral Daily    amiodarone  200 mg Oral Daily    [Held by provider] metoprolol succinate  12.5 mg Oral Daily    heparin (porcine)  5,000 Units SubCUTAneous 3 times per day    OLANZapine  5 mg Oral Nightly    lidocaine  2 patch TransDERmal Daily    gabapentin  100 mg Oral TID    sodium chloride flush  5-40 mL IntraVENous 2 times per day    levothyroxine  112 mcg Oral QAM AC    atorvastatin  10 mg Oral Nightly    pantoprazole  40 mg Oral QAM AC    midazolam  0.5 mg IntraVENous Once    sodium chloride flush  5-40 mL IntraVENous 2 times per day     PRN Meds: ketorolac, perflutren lipid microspheres, sodium chloride flush, ondansetron **OR** ondansetron, polyethylene glycol, acetaminophen **OR** acetaminophen, clonazePAM, oxyCODONE **OR** oxyCODONE, simethicone, sodium chloride flush, sodium chloride      Intake/Output Summary (Last 24 hours) at 1/18/2023 0425  Last data filed at 1/17/2023 0549  Gross per 24 hour   Intake 120 ml   Output 500 ml   Net -380 ml         Physical Exam Performed:    BP (!) 91/51   Pulse 94   Temp 98 °F (36.7 °C) (Axillary)   Resp 16   Ht 5' 1\" (1.549 m)   Wt 145 lb 4.5 oz (65.9 kg)   SpO2 96%   BMI 27.45 kg/m²     GEN more fatigued. HEENT normocephalic, anicteric sclera, EOMI, mucosa moist, no stridor  NECK supple, trachea midline  RESP on RA, in no distress, clear to auscultation  CARDS RRR, S1, S2, systolic murmurs over RUSB, LUSB, left upper extremity edema, left thigh edema, radial pulse 2+, DP pulse 2+  ABD distended, +BS, soft nontender  MSK left hip tenderness, no cyanosis, no clubbing  SKIN warm, dry  NEURO more fatigued, confused today, no facial asymmetry, no dysarthria, moving spontaneously  PSYCH normal mood      Labs:   Recent Labs     01/16/23  1226 01/17/23  1110   WBC 4.2 13.3*   HGB 7.6* 9.3*   HCT 23.2* 27.5*   * 206       Recent Labs     01/16/23  1226 01/17/23  1110   * 134*   K 4.2 4.6    101   CO2 20* 22   BUN 16 20   CREATININE 0.8 0.8   CALCIUM 8.3 8.8   PHOS 3.9 4.2       Recent Labs     01/16/23  1226 01/17/23  1110   AST 29 22   ALT 15 15   BILITOT 0.6 1.1*   ALKPHOS 47 79       No results for input(s): INR in the last 72 hours. Recent Labs     01/15/23  1543 01/15/23  2134 01/16/23  0809   TROPONINI 0.16* 0.19* 0.17*         Urinalysis:      Lab Results   Component Value Date/Time    NITRU Negative 01/17/2023 01:22 PM    [address] None seen 01/17/2023 01:22 PM    BACTERIA 1+ 01/17/2023 01:22 PM    RBCUA None seen 01/17/2023 01:22 PM    BLOODU Negative 01/17/2023 01:22 PM    SPECGRAV 1.025 01/17/2023 01:22 PM    GLUCOSEU Negative 01/17/2023 01:22 PM       Radiology:  CT HEAD WO CONTRAST   Final Result      Chronic ischemic changes of the brain and atrophy with no midline shift or hydrocephalus or definitive hemorrhage. Extensive artifact noted likely from patient motion            XR TIBIA FIBULA LEFT (2 VIEWS)   Final Result      Normal radiographs of the left tibia and fibula. Knee compartment narrowing appreciated.       Left ankle 3 view:      HISTORY: Trauma:      FINDINGS:      AP lateral oblique views demonstrate normal alignment. There is no acute fracture or dislocation      IMPRESSION:      No acute fracture       LEFT HIP 2 VIEW , AP view pelvis      HISTORY: Goran Pear with hip pain, previous left hip pinning      PROCEDURE: AP view the pelvis and lateral view of left hip were obtained      COMPARISON: Intraoperative studies from January 12, 2023      FINDINGS:      There is normal alignment of the right hip with 4 screws noted traversing the trochanter femoral neck and head from previous fracture fixation without significant displacement, unchanged. Left trochanteric hip fixation is intact with transfixation screws through the trochanter femoral neck and long intramedullary amaris remaining in stable position compared to recent study. No discrete acute fracture or dislocation. No expansile cystic or destructive change identified      AP view of the pelvis demonstrates no acute bony defect. Diffuse demineralization noted. Degenerative changes lower lumbar spine. The sacroiliac margins appear normal.    There is some minimal sclerosis along the acetabulum      IMPRESSION:      No acute deformity involving the right or left hip. Previous pinning of the right hip is unchanged. Previous fracture fixation of the left trochanteric and hip is intact      No acute bony defect in the pelvis. Diffuse demineralization            XR SHOULDER LEFT (MIN 2 VIEWS)   Final Result      Chronic advanced arthropathy of the left shoulder with loose bodies and subarticular erosions affecting the humeral head and advanced glenohumeral arthritic change and remodeling         XR ANKLE LEFT (2 VIEWS)   Final Result      Normal radiographs of the left tibia and fibula. Knee compartment narrowing appreciated. Left ankle 3 view:      HISTORY: Trauma:      FINDINGS:      AP lateral oblique views demonstrate normal alignment.     There is no acute fracture or dislocation      IMPRESSION:      No acute fracture       LEFT HIP 2 VIEW , AP view pelvis      HISTORY: Ankita Riding with hip pain, previous left hip pinning      PROCEDURE: AP view the pelvis and lateral view of left hip were obtained      COMPARISON: Intraoperative studies from January 12, 2023      FINDINGS:      There is normal alignment of the right hip with 4 screws noted traversing the trochanter femoral neck and head from previous fracture fixation without significant displacement, unchanged. Left trochanteric hip fixation is intact with transfixation screws through the trochanter femoral neck and long intramedullary amaris remaining in stable position compared to recent study. No discrete acute fracture or dislocation. No expansile cystic or destructive change identified      AP view of the pelvis demonstrates no acute bony defect. Diffuse demineralization noted. Degenerative changes lower lumbar spine. The sacroiliac margins appear normal.    There is some minimal sclerosis along the acetabulum      IMPRESSION:      No acute deformity involving the right or left hip. Previous pinning of the right hip is unchanged. Previous fracture fixation of the left trochanteric and hip is intact      No acute bony defect in the pelvis. Diffuse demineralization            XR KNEE RIGHT (1-2 VIEWS)   Final Result      1. Right knee: Advanced arthritic change as described above. No sign of any fracture or acute deformity. 2. Left knee: Moderate degenerative change particularly involving the lateral compartment with some prepatellar soft tissue swelling. No acute defect      XR HIP 2-3 VW W PELVIS LEFT   Final Result      Normal radiographs of the left tibia and fibula. Knee compartment narrowing appreciated. Left ankle 3 view:      HISTORY: Trauma:      FINDINGS:      AP lateral oblique views demonstrate normal alignment.     There is no acute fracture or dislocation      IMPRESSION:      No acute fracture       LEFT HIP 2 VIEW , AP view pelvis      HISTORY: Ankita Riding with hip pain, previous left hip pinning      PROCEDURE: AP view the pelvis and lateral view of left hip were obtained      COMPARISON: Intraoperative studies from January 12, 2023      FINDINGS:      There is normal alignment of the right hip with 4 screws noted traversing the trochanter femoral neck and head from previous fracture fixation without significant displacement, unchanged. Left trochanteric hip fixation is intact with transfixation screws through the trochanter femoral neck and long intramedullary amaris remaining in stable position compared to recent study. No discrete acute fracture or dislocation. No expansile cystic or destructive change identified      AP view of the pelvis demonstrates no acute bony defect. Diffuse demineralization noted. Degenerative changes lower lumbar spine. The sacroiliac margins appear normal.    There is some minimal sclerosis along the acetabulum      IMPRESSION:      No acute deformity involving the right or left hip. Previous pinning of the right hip is unchanged. Previous fracture fixation of the left trochanteric and hip is intact      No acute bony defect in the pelvis. Diffuse demineralization            XR KNEE LEFT (1-2 VIEWS)   Final Result      1. Right knee: Advanced arthritic change as described above. No sign of any fracture or acute deformity. 2. Left knee: Moderate degenerative change particularly involving the lateral compartment with some prepatellar soft tissue swelling. No acute defect      XR HIP LEFT (2-3 VIEWS)   Final Result      Intraoperative fluoroscopy for open reduction internal fixation of the left hip. Refer to the operative note for details. FLUORO FOR SURGICAL PROCEDURES   Final Result      Intraoperative fluoroscopy for open reduction internal fixation of the left hip. Refer to the operative note for details.       CT CHEST ABDOMEN PELVIS W CONTRAST Additional Contrast? None   Final Result      CHEST:   1.  Small right pleural effusion and trace left pleural effusion. Mild bibasilar atelectasis. 2.  Stable remote compression deformities at T12 and L1 compared to prior MRI from 2015. There is also new compression deformity at T11 compared to the study, but this is age indeterminant and may also be remote. Recommend correlation with point    tenderness in this level. 3.  Motion limited exam without definite evidence of acute traumatic intrathoracic abnormality. 4.  Moderate hiatal hernia. ABDOMEN AND PELVIS:   1.  Mildly displaced comminuted intratrochanteric fracture of the left femur. 2.  Surrounding stranding/hematoma in the left thigh as detailed above. 3.  No additional acute traumatic abnormality identified in the abdomen and pelvis per         CT CERVICAL SPINE WO CONTRAST   Final Result   1. No acute traumatic abnormality of the cervical spine. 2. Stable multilevel degenerative disc disease and facet arthropathy. 3. Stable slight rotatory subluxation of C1 on C2 on the right side. CT HEAD WO CONTRAST   Final Result      1. Stable exam with no acute intracranial abnormality. XR FEMUR LEFT (MIN 2 VIEWS)   Final Result   Addendum (preliminary) 1 of 1   [ADDENDUM #1   Addendum: Initial report by Dr. Yanet Bowden. Addendum by Dr. Verneita Bamberger. Frontal view of the pelvis and frontal and frog-leg lateral views of the    left femur were obtained. There is a nondisplaced intertrochanteric    fracture of the left femur. No dislocation. Orthopedic hardware of the    proximal right femur is noted. No    additional fracture deformity. Final   Nondisplaced intertrochanteric fracture on the left. IMPRESSION:      No pneumothorax. Diffusely prominent interstitial markings presumably representing chronic interstitial lung disease. No prior for comparison. Normal cardiomediastinal silhouette. Presumed old fracture deformity of the proximal left humerus.  If there is left shoulder pain then dedicated radiographs are indicated.      XR PELVIS (1-2 VIEWS)   Final Result   Addendum (preliminary) 1 of 1   [ ADDENDUM #1 *   Addendum: Initial report by Dr. River. Addendum by Dr. Ferreira.      Frontal view of the pelvis and frontal and frog-leg lateral views of the    left femur were obtained. There is a nondisplaced intertrochanteric    fracture of the left femur. No dislocation. Orthopedic hardware of the    proximal right femur is noted. No    additional fracture deformity.         Final   Nondisplaced intertrochanteric fracture on the left.   IMPRESSION:      No pneumothorax.      Diffusely prominent interstitial markings presumably representing chronic interstitial lung disease. No prior for comparison.      Normal cardiomediastinal silhouette.      Presumed old fracture deformity of the proximal left humerus. If there is left shoulder pain then dedicated radiographs are indicated.      XR CHEST PORTABLE   Final Result   Addendum (preliminary) 1 of 1   [ADDENDUM #1    Addendum: Initial report by Dr. River. Addendum by Dr. Ferreira.      Frontal view of the pelvis and frontal and frog-leg lateral views of the    left femur were obtained. There is a nondisplaced intertrochanteric    fracture of the left femur. No dislocation. Orthopedic hardware of the    proximal right femur is noted. No    additional fracture deformity.         Final   Nondisplaced intertrochanteric fracture on the left.   IMPRESSION:      No pneumothorax.      Diffusely prominent interstitial markings presumably representing chronic interstitial lung disease. No prior for comparison.      Normal cardiomediastinal silhouette.      Presumed old fracture deformity of the proximal left humerus. If there is left shoulder pain then dedicated radiographs are indicated.              Assessment/Plan:      This is a 89 yo Female, with history of bilateral breast cancer, s/p left mastectomy 1975, right lumpectomy and radiation in  1999, DVT 2009 while on Evista, hypothyroidism, esophageal spasm, schatzki's ring, EGD with dilation 1/2011, GERD, hepatitis B, OA, spinal stenosis, Rt Hip Fracture s/p surgery 2/2010, Thoracic and Lumbar compression fractures, who presents for evaluation after fall.  She was found to have sustained mildly displaced comminuted intratrochanteric fracture of the left femur.     Patient is alert, oriented, and coherent.  Daughter in-law, Cindy, is present and assists with clinical history.       This morning patient walked from her bedroom to the bathroom.  When she reached the bathroom, she felt lightheaded.  States that she must have turned around, lost her balance, and fell onto her left side.  After falling she was in significant pain and was unable to rise on her own.       Patient has a  who comes every morning to walk her dog at ~ 730 AM.    Knowing that her  was coming, patient screamed out for help.   Reportedly she was found likely on her left side on the bathroom floor.  Her  called EMS and patient was taken to [facility] for evaluation.     Patient has been in her usual state of health.  She walks about 1 mile daily without assistance.   She used to walk longer distances.    Patient denies recent fevers, chills, cough, shortness of breath, chest pain, palpitations at rest and on exertion.  She denies history of nausea, vomiting, abdominal pain, dysuria, diarrhea.    She eats three meals daily.  States that she does not eat much during lunchtime.      Daughter in-law, Cindy said patient had another fall in February 2022 while walking her dog; she was found on the side walk.  After, she has been working with PT/OT to work on her strength and stability.  She may have fallen a couple of times after.     Active Hospital Problems    Diagnosis Date Noted    Closed fracture of left hip (HCC) [S72.002A] 01/16/2023     Priority: Medium    Other fracture [facility] left femur[facility]     Priority: Medium     PostOp Fever  - fever up to 102.3 F.  - new leukocytosis with WBC 13.3K with left shift on 1/17/23.  - Procalcitonin 0.41, from 0.19 on 1/13/23.  - confusion.  - UA 1/17 showed trace protein, no nitrite, no leuk est, WBC not seen. - blood cultures 1/17 pending.  - CXR 1/17 showed bilateral pleural-parenchymal opacities suggesting moderate large pleural effusions and airspace disease, right greater than left. Pulmonary edema suspected. Pneumonia not excluded. No pneumothorax. - Start IV vancomycin, cefepime (1/17-). Encephalopathy, Likely Post-op Delirium  - became confused on 1/14 and pulled out IV's. - had to place in soft restraints for safety. - in the setting of post-op and elderly age. Adequate hydration and pain control.  - placed in restraints on 1/14-1/15,  - mental clear and oriented x 3 on 1/15. She was understanding about being in restraints so that she would not inadvertently pull on her IV lines. - off restraints on 1/16. New Onset Atrial Fibrillation with RVR on 1//14  - RVR with HR to 160's and became hypotensive to SBP 70's in the setting of severe AS  - Received 1L NS bolus. - Received amiodarone bolus 150 mg (1/14), then started infusion of 1 mg/min for 6 hours, followed by 0.5 mg/min. -- she converted back NSR after amiodarone bolus and start of 1mg/min infusion. -- consider anticoagulation in the setting valvular disease, however, she is 79 yo. The onset of atrial fibrillation, converted to NSR < 48 hours. - Monitor and discuss with family. - start ASA 81 mg daily. Still contemplating AC.  - Cardiology evaluated patient and started on amiodarone 200 mg daily.      Severe Aortic Stenosis, Symptomatic  Borderline BP  LV with Grade II Diastolic Dysfunction  Mild to Moderate Mitral Regurgitation  Mild to Moderate Mitral Stenosis  - ECHO 1/12/23 showed normal LV cavity size, wall thickness, LVEF 60-65%, no regional wall motion abnormalities. Grade II diastolic dysfunction. Mitral valve leaflets are thickened/calcified. MAC. Mild to moderate MR and MS. Severe aortic stenosis with peak velocity of 5.3 m/s, mean pressure gradient of 66 mmHg. Mild TR. PSAP 48 mmHg. - Reports lightheadedness on rising. When she fell on 1/12 -- when she walked to the bathroom she was not initially lightheaded; after reaching the bathroom she felt lightheaded. She denies shortness of breath, chest pain, palpitations on exertion. - cardiology consulted for consideration of TAVR. Patient extremely functional; walks 1 mile daily without assistive device. - Cardiology evaluated patient. Recommended outpatient evaluation for TAVR. Mildly Displaced Comminuted Intra-trochanteric Fracture of Left Femur  - CT A/P with contrast 1/12/23 showed mildly displaced comminuted intratrochanteric fracture of the left femur. Surrounding stranding/hematoma in the left thigh as detailed above. - s/p ORIF of left femoral fracture with CMN on 1/12/23  - monitor for post-op complications. - Pain control -- sensitive to narcotics. - Bowel regimen. Recurrent Falls  Lightheadedness  - walks 1 mile daily without assistive device. - Had a fall in February 2022 while walking her dog.  - she may have fallen a couple more times after, then today 1/12. -- prior to falling today -- she felt lightheaded, turned and lost her balance resulting in her fall. -- adm labs showed elevated lactic acid and ECHO showed severe aortic stenosis. - CT of Head wo contrast 1/12 showed no acute intracranial process. - CT Cervical Spine wo contrast 1/12 showed no acute traumatic abnormality of the cervical spine. Stable multilevel degenerative disc disease and facet arthropathy. Stable slight rotatory subluxation of C1 on C2 on the right side.  - B12 636, folate >20, vitamin D5OH 22.8 on 1/13/23.  - Start vitamin D3 2000 units daily.      Elevated Lactic Acid  - adm lactic acid 3.0.  - Administered 2 L LR, then started LR infusion 75 cc/hr. - lactic acid normalized after receiving IVF's. Small Right Pleural Effusion  Trace Left Pleural Effusion  - in the setting of valvular disease and LV with grade II diastolic dysfunction,    Urinary Retention  - bladder scan showed > 500 cc urine.    - marrero placed on 1/12. Rt Upper Extremity Acute Totally Occluding Superficial Venous Thrombosis of Cephalic Vein From Wrist to Mid Forearm  - on Venous US of UE on 1/17.  - warm compresses. - distal superficial thrombus. - supportive care. Hypothyroidism  - TSH 1.15, free T4 1.1 on 1/13/23.  - Continue home levothyroxine 112 mcg daily.  - TSH, free T4 (pharmacy verified that she has not filled her prescription in several months.)  - Recheck TFT in 1 month. Remote Compression Deformities at T12, L1  New Compression Deformity at T11  OA  - vitamin D25OH level 22.8 on 1/13/23.  - start vitamin D3 2000 units daily. History of Rt Hip Fracture   - s/p surgery 2/2010     History of bilateral breast cancer  - s/p left mastectomy 1975  - s/p right lumpectomy and radiation in 1999     DVT 2009   - thought possible related to Evista  - DVT ppx. Moderate Hiatal Hernia  GERD  History of Esophageal spasm  History of Schatzki's ring  - S/p EGD with dilation 1/2011  - Continue home omeprazole 40 mg daily. History of Hepatitis B       DVT Prophylaxis:   Diet: ADULT ORAL NUTRITION SUPPLEMENT; Breakfast, Lunch, Dinner; Standard High Calorie/High Protein Oral Supplement  ADULT DIET; Regular; Low Fat/Low Chol/High Fiber/2 gm Na  Code Status: Full Code    PT/OT Eval Status: PT/OT    Dispo - once medically stable.     Sandra Gunter MD

## 2023-01-18 NOTE — PROGRESS NOTES
Hospitalist Progress Note      PCP: Jamshid Izaguirre    Date of Admission: 1/12/2023    Chief Complaint: worsening lethargy      Subjective:     Last fever 102.3 F on 1/17. Not tachycardic. Started IV vancomycin, cefepime on 1/17. Restarted LR infusion at 75 cc/hr, decrease to 60 cc.hr.      She awake and oriented today. She is not confused and speaking calmy. She denies lightheadedness, shortness of breath, chest pain, palpitations. Her left thigh and leg pain with movements. She is not eating much, but is drinking the small ensure bottles. She denies nausea, abdominal pain, dysuria, diarrhea.       Medications:  Reviewed    Infusion Medications    lactated ringers 60 mL/hr at 01/18/23 0645    sodium chloride 25 mL/hr at 01/13/23 0932     Scheduled Medications    magnesium sulfate  1,000 mg IntraVENous Once    cefepime  2,000 mg IntraVENous Q12H    vancomycin  1,250 mg IntraVENous Q24H    aspirin  81 mg Oral Daily    amiodarone  200 mg Oral Daily    [Held by provider] metoprolol succinate  12.5 mg Oral Daily    heparin (porcine)  5,000 Units SubCUTAneous 3 times per day    OLANZapine  5 mg Oral Nightly    lidocaine  2 patch TransDERmal Daily    gabapentin  100 mg Oral TID    sodium chloride flush  5-40 mL IntraVENous 2 times per day    levothyroxine  112 mcg Oral QAM AC    atorvastatin  10 mg Oral Nightly    pantoprazole  40 mg Oral QAM AC    midazolam  0.5 mg IntraVENous Once    sodium chloride flush  5-40 mL IntraVENous 2 times per day     PRN Meds: ketorolac, perflutren lipid microspheres, sodium chloride flush, ondansetron **OR** ondansetron, polyethylene glycol, acetaminophen **OR** acetaminophen, clonazePAM, oxyCODONE **OR** oxyCODONE, simethicone, sodium chloride flush, sodium chloride      Intake/Output Summary (Last 24 hours) at 1/18/2023 1122  Last data filed at 1/18/2023 0643  Gross per 24 hour   Intake --   Output 450 ml   Net -450 ml         Physical Exam Performed:    BP 99/63   Pulse 88   Temp 97.8 °F (36.6 °C) (Oral)   Resp 16   Ht 5' 1\" (1.549 m)   Wt 145 lb 4.5 oz (65.9 kg)   SpO2 96%   BMI 27.45 kg/m²     GEN more fatigued.  HEENT normocephalic, anicteric sclera, EOMI, mucosa moist, no stridor  NECK supple, trachea midline  RESP on 2-3LPM, in no distress, clear to auscultation anteriorly, decreased at right base  CARDS RRR, S1, S2, systolic murmurs over RUSB, LUSB, left>right upper extremity edema, left thigh edema and bilateral leg edema, radial pulse 2+, DP pulse 2+  ABD +BS, soft nontender   marrero catheter in place  MSK left hip tenderness, edema, no cyanosis, no clubbing  SKIN warm, dry  NEURO some fatigue, oriented x 3, no facial asymmetry, no dysarthria, moving spontaneously  PSYCH normal mood      Labs:   Recent Labs     01/16/23  1226 01/17/23  1110 01/18/23  0905   WBC 4.2 13.3* 7.4   HGB 7.6* 9.3* 8.4*   HCT 23.2* 27.5* 24.1*   * 206 150       Recent Labs     01/16/23  1226 01/17/23  1110 01/18/23  0905   * 134* 132*   K 4.2 4.6 4.2    101 101   CO2 20* 22 24   BUN 16 20 18   CREATININE 0.8 0.8 0.7   CALCIUM 8.3 8.8 8.7   PHOS 3.9 4.2 3.1       Recent Labs     01/16/23  1226 01/17/23  1110 01/18/23  0905   AST 29 22 16   ALT 15 15 11   BILITOT 0.6 1.1* 1.1*   ALKPHOS 47 79 77       No results for input(s): INR in the last 72 hours.    Recent Labs     01/15/23  1543 01/15/23  2134 01/16/23  0809   TROPONINI 0.16* 0.19* 0.17*         Urinalysis:      Lab Results   Component Value Date/Time    NITRU Negative 01/17/2023 01:22 PM    WBCUA None seen 01/17/2023 01:22 PM    BACTERIA 1+ 01/17/2023 01:22 PM    RBCUA None seen 01/17/2023 01:22 PM    BLOODU Negative 01/17/2023 01:22 PM    SPECGRAV 1.025 01/17/2023 01:22 PM    GLUCOSEU Negative 01/17/2023 01:22 PM       Radiology:  CT HEAD WO CONTRAST   Final Result      Chronic ischemic changes of the brain and atrophy with no midline shift or hydrocephalus or definitive hemorrhage. Extensive artifact noted  likely from patient motion            XR TIBIA FIBULA LEFT (2 VIEWS)   Final Result      Normal radiographs of the left tibia and fibula. Knee compartment narrowing appreciated. Left ankle 3 view:      HISTORY: Trauma:      FINDINGS:      AP lateral oblique views demonstrate normal alignment. There is no acute fracture or dislocation      IMPRESSION:      No acute fracture       LEFT HIP 2 VIEW , AP view pelvis      HISTORY: Elodia Chris with hip pain, previous left hip pinning      PROCEDURE: AP view the pelvis and lateral view of left hip were obtained      COMPARISON: Intraoperative studies from January 12, 2023      FINDINGS:      There is normal alignment of the right hip with 4 screws noted traversing the trochanter femoral neck and head from previous fracture fixation without significant displacement, unchanged. Left trochanteric hip fixation is intact with transfixation screws through the trochanter femoral neck and long intramedullary amaris remaining in stable position compared to recent study. No discrete acute fracture or dislocation. No expansile cystic or destructive change identified      AP view of the pelvis demonstrates no acute bony defect. Diffuse demineralization noted. Degenerative changes lower lumbar spine. The sacroiliac margins appear normal.    There is some minimal sclerosis along the acetabulum      IMPRESSION:      No acute deformity involving the right or left hip. Previous pinning of the right hip is unchanged. Previous fracture fixation of the left trochanteric and hip is intact      No acute bony defect in the pelvis.  Diffuse demineralization            XR SHOULDER LEFT (MIN 2 VIEWS)   Final Result      Chronic advanced arthropathy of the left shoulder with loose bodies and subarticular erosions affecting the humeral head and advanced glenohumeral arthritic change and remodeling         XR ANKLE LEFT (2 VIEWS)   Final Result      Normal radiographs of the left tibia and fibula. Knee compartment narrowing appreciated. Left ankle 3 view:      HISTORY: Trauma:      FINDINGS:      AP lateral oblique views demonstrate normal alignment. There is no acute fracture or dislocation      IMPRESSION:      No acute fracture       LEFT HIP 2 VIEW , AP view pelvis      HISTORY: Vevelyn Huh with hip pain, previous left hip pinning      PROCEDURE: AP view the pelvis and lateral view of left hip were obtained      COMPARISON: Intraoperative studies from January 12, 2023      FINDINGS:      There is normal alignment of the right hip with 4 screws noted traversing the trochanter femoral neck and head from previous fracture fixation without significant displacement, unchanged. Left trochanteric hip fixation is intact with transfixation screws through the trochanter femoral neck and long intramedullary amaris remaining in stable position compared to recent study. No discrete acute fracture or dislocation. No expansile cystic or destructive change identified      AP view of the pelvis demonstrates no acute bony defect. Diffuse demineralization noted. Degenerative changes lower lumbar spine. The sacroiliac margins appear normal.    There is some minimal sclerosis along the acetabulum      IMPRESSION:      No acute deformity involving the right or left hip. Previous pinning of the right hip is unchanged. Previous fracture fixation of the left trochanteric and hip is intact      No acute bony defect in the pelvis. Diffuse demineralization            XR KNEE RIGHT (1-2 VIEWS)   Final Result      1. Right knee: Advanced arthritic change as described above. No sign of any fracture or acute deformity. 2. Left knee: Moderate degenerative change particularly involving the lateral compartment with some prepatellar soft tissue swelling. No acute defect      XR HIP 2-3 VW W PELVIS LEFT   Final Result      Normal radiographs of the left tibia and fibula.  Knee compartment narrowing appreciated. Left ankle 3 view:      HISTORY: Trauma:      FINDINGS:      AP lateral oblique views demonstrate normal alignment. There is no acute fracture or dislocation      IMPRESSION:      No acute fracture       LEFT HIP 2 VIEW , AP view pelvis      HISTORY: Ximena Speak with hip pain, previous left hip pinning      PROCEDURE: AP view the pelvis and lateral view of left hip were obtained      COMPARISON: Intraoperative studies from January 12, 2023      FINDINGS:      There is normal alignment of the right hip with 4 screws noted traversing the trochanter femoral neck and head from previous fracture fixation without significant displacement, unchanged. Left trochanteric hip fixation is intact with transfixation screws through the trochanter femoral neck and long intramedullary amaris remaining in stable position compared to recent study. No discrete acute fracture or dislocation. No expansile cystic or destructive change identified      AP view of the pelvis demonstrates no acute bony defect. Diffuse demineralization noted. Degenerative changes lower lumbar spine. The sacroiliac margins appear normal.    There is some minimal sclerosis along the acetabulum      IMPRESSION:      No acute deformity involving the right or left hip. Previous pinning of the right hip is unchanged. Previous fracture fixation of the left trochanteric and hip is intact      No acute bony defect in the pelvis. Diffuse demineralization            XR KNEE LEFT (1-2 VIEWS)   Final Result      1. Right knee: Advanced arthritic change as described above. No sign of any fracture or acute deformity. 2. Left knee: Moderate degenerative change particularly involving the lateral compartment with some prepatellar soft tissue swelling. No acute defect      XR HIP LEFT (2-3 VIEWS)   Final Result      Intraoperative fluoroscopy for open reduction internal fixation of the left hip. Refer to the operative note for details. FLUORO FOR SURGICAL PROCEDURES   Final Result      Intraoperative fluoroscopy for open reduction internal fixation of the left hip. Refer to the operative note for details. CT CHEST ABDOMEN PELVIS W CONTRAST Additional Contrast? None   Final Result      CHEST:   1.  Small right pleural effusion and trace left pleural effusion. Mild bibasilar atelectasis. 2.  Stable remote compression deformities at T12 and L1 compared to prior MRI from 2015. There is also new compression deformity at T11 compared to the study, but this is age indeterminant and may also be remote. Recommend correlation with point    tenderness in this level. 3.  Motion limited exam without definite evidence of acute traumatic intrathoracic abnormality. 4.  Moderate hiatal hernia. ABDOMEN AND PELVIS:   1.  Mildly displaced comminuted intratrochanteric fracture of the left femur. 2.  Surrounding stranding/hematoma in the left thigh as detailed above. 3.  No additional acute traumatic abnormality identified in the abdomen and pelvis per         CT CERVICAL SPINE WO CONTRAST   Final Result   1. No acute traumatic abnormality of the cervical spine. 2. Stable multilevel degenerative disc disease and facet arthropathy. 3. Stable slight rotatory subluxation of C1 on C2 on the right side. CT HEAD WO CONTRAST   Final Result      1. Stable exam with no acute intracranial abnormality. XR FEMUR LEFT (MIN 2 VIEWS)   Final Result   Addendum (preliminary) 1 of 1   [ADDENDUM #1   Addendum: Initial report by Dr. Sebas Ibarra. Addendum by Dr. Jaimie Doherty. Frontal view of the pelvis and frontal and frog-leg lateral views of the    left femur were obtained. There is a nondisplaced intertrochanteric    fracture of the left femur. No dislocation. Orthopedic hardware of the    proximal right femur is noted. No    additional fracture deformity. Final   Nondisplaced intertrochanteric fracture on the left.    IMPRESSION:      No pneumothorax. Diffusely prominent interstitial markings presumably representing chronic interstitial lung disease. No prior for comparison. Normal cardiomediastinal silhouette. Presumed old fracture deformity of the proximal left humerus. If there is left shoulder pain then dedicated radiographs are indicated. XR PELVIS (1-2 VIEWS)   Final Result   Addendum (preliminary) 1 of 1   [ ADDENDUM #1 *   Addendum: Initial report by Dr. Poli Gupta. Addendum by Dr. Miranda Oconnell. Frontal view of the pelvis and frontal and frog-leg lateral views of the    left femur were obtained. There is a nondisplaced intertrochanteric    fracture of the left femur. No dislocation. Orthopedic hardware of the    proximal right femur is noted. No    additional fracture deformity. Final   Nondisplaced intertrochanteric fracture on the left. IMPRESSION:      No pneumothorax. Diffusely prominent interstitial markings presumably representing chronic interstitial lung disease. No prior for comparison. Normal cardiomediastinal silhouette. Presumed old fracture deformity of the proximal left humerus. If there is left shoulder pain then dedicated radiographs are indicated. XR CHEST PORTABLE   Final Result   Addendum (preliminary) 1 of 1   [ADDENDUM #1    Addendum: Initial report by Dr. Poli Gupta. Addendum by Dr. Miranda Oconnell. Frontal view of the pelvis and frontal and frog-leg lateral views of the    left femur were obtained. There is a nondisplaced intertrochanteric    fracture of the left femur. No dislocation. Orthopedic hardware of the    proximal right femur is noted. No    additional fracture deformity. Final   Nondisplaced intertrochanteric fracture on the left. IMPRESSION:      No pneumothorax. Diffusely prominent interstitial markings presumably representing chronic interstitial lung disease. No prior for comparison. Normal cardiomediastinal silhouette.       Presumed old fracture deformity of the proximal left humerus. If there is left shoulder pain then dedicated radiographs are indicated. Assessment/Plan: This is a 81 yo Female, with history of bilateral breast cancer, s/p left mastectomy 1975, right lumpectomy and radiation in 1999, DVT 2009 while on Evista, hypothyroidism, esophageal spasm, schatzki's ring, EGD with dilation 1/2011, GERD, hepatitis B, OA, spinal stenosis, Rt Hip Fracture s/p surgery 2/2010, Thoracic and Lumbar compression fractures, who presents for evaluation after fall. She was found to have sustained mildly displaced comminuted intratrochanteric fracture of the left femur. Patient is alert, oriented, and coherent. Daughter in-law, Brock Ibarra, is present and assists with clinical history. This morning patient walked from her bedroom to the bathroom. When she reached the bathroom, she felt lightheaded. States that she must have turned around, lost her balance, and fell onto her left side. After falling she was in significant pain and was unable to rise on her own. Patient has a  who comes every morning to walk her dog at ~ 730 AM.    Knowing that her  was coming, patient screamed out for help. Reportedly she was found likely on her left side on the bathroom floor. Her  called EMS and patient was taken to [facility]. for evaluation. Patient has been in her usual state of health. She walks about 1 mile daily without assistance. She used to walk longer distances. Patient denies recent fevers, chills, cough, shortness of breath, chest pain, palpitations at rest and on exertion. She denies history of nausea, vomiting, abdominal pain, dysuria, diarrhea. She eats three meals daily. States that she does not eat much during lunchtime. Daughter in-law, Brock Ibarra said patient had another fall in February 2022 while walking her dog; she was found on the side walk.   After, she has been working with PT/OT to work on her strength and stability. She may have fallen a couple of times after. Active Hospital Problems    Diagnosis Date Noted    Closed fracture of left hip ([address] [S72.002A] 01/16/2023     Priority: Medium    Other fracture of left femur, initial encounter for closed fracture [facility]) [S72.8X2A] 01/12/2023     Priority: Medium     PostOp Fever  Possible Pneumonia  - fever up to 102.3 F.  - new leukocytosis with WBC 13.3K with left shift on 1/17/23.  - Procalcitonin 0.41, from 0.19 on 1/13/23.  - confusion.  - UA 1/17 showed trace protein, no nitrite, no leuk est, WBC not seen. - blood cultures 1/17 negative to date. - CXR 1/17 showed bilateral pleural-parenchymal opacities suggesting moderate large pleural effusions and airspace disease, right greater than left. Pulmonary edema suspected. Pneumonia not excluded. No pneumothorax. - Started IV vancomycin, cefepime (1/17-). - MRSA nasal swab. Encephalopathy, Post-op Delirium, Fevers, Possible Pneumonia  - became confused on 1/14 and pulled out IV's. - had to place in soft restraints for safety. - in the setting of post-op and elderly age. Adequate hydration and pain control.  - placed in restraints on 1/14-1/15,  - mental clear and oriented x 3 on 1/15. She was understanding about being in restraints so that she would not inadvertently pull on her IV lines. - off restraints on 1/16. New Onset Atrial Fibrillation with RVR on 1//14  - RVR with HR to 160's and became hypotensive to SBP 70's in the setting of severe AS  - Received 1L NS bolus. - Received amiodarone bolus 150 mg (1/14), then started infusion of 1 mg/min for 6 hours, followed by 0.5 mg/min. -- she converted back NSR after amiodarone bolus and start of 1mg/min infusion. -- consider anticoagulation in the setting valvular disease, however, she is 81 yo. The onset of atrial fibrillation, converted to NSR < 48 hours. - Monitor and discuss with family.   - start ASA 81 mg daily. Still contemplating AC.  - Cardiology evaluated patient and started on amiodarone 200 mg daily. Acute Hypoxic Respiratory Failure  Bilateral Pleural Effusion  ? Pulmonary Edema vs Pneumonia  - in the setting right pleural effusion and possible pneumonia. - CT Chest 1/12 showed small right pleural effusion, trace left pleural effusion. Mild basilar atelectasis. - CXR (portable) 1/17 showed moderate to large pleural effusions and airspace disease in right > left lungs, favoring pulmonary edema. Pneumonia not excluded. - diuresis as below in Edema section. Edema  - in the setting of immobility and severe AS, receiving low volume IVF's for borderline BP.  - start IV lasix (1/18-) with low rate IVF's to achieve diuresis in the setting of borderline BP and to protect her kidney. Severe Aortic Stenosis, Symptomatic  Borderline BP  LV with Grade II Diastolic Dysfunction  Mild to Moderate Mitral Regurgitation  Mild to Moderate Mitral Stenosis  - ECHO 1/12/23 showed normal LV cavity size, wall thickness, LVEF 60-65%, no regional wall motion abnormalities. Grade II diastolic dysfunction. Mitral valve leaflets are thickened/calcified. MAC. Mild to moderate MR and MS. Severe aortic stenosis with peak velocity of 5.3 m/s, mean pressure gradient of 66 mmHg. Mild TR. PSAP 48 mmHg. - Reports lightheadedness on rising. When she fell on 1/12 -- when she walked to the bathroom she was not initially lightheaded; after reaching the bathroom she felt lightheaded. She denies shortness of breath, chest pain, palpitations on exertion. - cardiology consulted for consideration of TAVR. Patient extremely functional; walks 1 mile daily without assistive device. - Cardiology evaluated patient. Recommended outpatient evaluation for TAVR.     Mildly Displaced Comminuted Intra-trochanteric Fracture of Left Femur  - CT A/P with contrast 1/12/23 showed mildly displaced comminuted intratrochanteric fracture of the left femur. Surrounding stranding/hematoma in the left thigh as detailed above. - s/p ORIF of left femoral fracture with CMN on 1/12/23  - monitor for post-op complications. - Pain control -- sensitive to narcotics. - Bowel regimen. Recurrent Falls  Lightheadedness  - walks 1 mile daily without assistive device. - Had a fall in February 2022 while walking her dog.  - she may have fallen a couple more times after, then today 1/12. -- prior to falling today -- she felt lightheaded, turned and lost her balance resulting in her fall. -- adm labs showed elevated lactic acid and ECHO showed severe aortic stenosis. - CT of Head wo contrast 1/12 showed no acute intracranial process. - CT Cervical Spine wo contrast 1/12 showed no acute traumatic abnormality of the cervical spine. Stable multilevel degenerative disc disease and facet arthropathy. Stable slight rotatory subluxation of C1 on C2 on the right side.  - B12 636, folate >20, vitamin D5OH 22.8 on 1/13/23.  - Started vitamin D3 2000 units daily. Elevated Lactic Acid  - adm lactic acid 3.0.  - Administered 2 L LR, then started LR infusion 75 cc/hr. - lactic acid normalized after receiving IVF's. Small Right Pleural Effusion  Trace Left Pleural Effusion  - in the setting of valvular disease and LV with grade II diastolic dysfunction,    Urinary Retention  - bladder scan showed > 500 cc urine.    - marrero placed on 1/12. Rt Upper Extremity Acute Totally Occluding Superficial Venous Thrombosis of Cephalic Vein From Wrist to Mid Forearm in the setting of IV lines, patient also pulled out IV's. - on Venous US of UE on 1/17.  - warm compresses. - distal superficial thrombus. - supportive care. Hypothyroidism  - TSH 1.15, free T4 1.1 on 1/13/23.  - Continue home levothyroxine 112 mcg daily.  - TSH, free T4 (pharmacy verified that she has not filled her prescription in several months.)  - Recheck TFT in 1 month.      Remote Compression Deformities at T12, L1  New Compression Deformity at T11  OA  - vitamin D25OH level 22.8 on 1/13/23.  - start vitamin D3 2000 units daily. History of Rt Hip Fracture   - s/p surgery 2/2010     History of bilateral breast cancer  - s/p left mastectomy 1975  - s/p right lumpectomy and radiation in 1999     DVT 2009   - thought possible related to Evista  - DVT ppx. Moderate Hiatal Hernia  GERD  History of Esophageal spasm  History of Schatzki's ring  - S/p EGD with dilation 1/2011  - Continue home omeprazole 40 mg daily. History of Hepatitis B       DVT Prophylaxis:   Diet: ADULT ORAL NUTRITION SUPPLEMENT; Breakfast, Lunch, Dinner; Standard High Calorie/High Protein Oral Supplement  ADULT DIET; Regular; Low Fat/Low Chol/High Fiber/2 gm Na  Code Status: Full Code    PT/OT Eval Status: PT/OT    Dispo - once medically stable.     Beverley Hayward MD

## 2023-01-18 NOTE — PLAN OF CARE
Pt is A&Ox4 with intermittent confusion. Pt calls out to staff at times. Pt receiving IV fluids and antibiotics. Pt has been out to restraints and remained free from falls. Problem: Skin/Tissue Integrity  Goal: Absence of new skin breakdown  Description: 1. Monitor for areas of redness and/or skin breakdown  2. Assess vascular access sites hourly  3. Every 4-6 hours minimum:  Change oxygen saturation probe site  4. Every 4-6 hours:  If on nasal continuous positive airway pressure, respiratory therapy assess nares and determine need for appliance change or resting period. Outcome: Progressing     Problem: Safety - Adult  Goal: Free from fall injury  Outcome: Adequate for Discharge     Problem: Safety - Medical Restraint  Goal: Remains free of injury from restraints (Restraint for Interference with Medical Device)  Description: INTERVENTIONS:  1. Determine that other, less restrictive measures have been tried or would not be effective before applying the restraint  2. Evaluate the patient's condition at the time of restraint application  3. Inform patient/family regarding the reason for restraint  4.  Q2H: Monitor safety, psychosocial status, comfort, nutrition and hydration  Outcome: Completed

## 2023-01-18 NOTE — CARE COORDINATION
TREV LVM to pt's son to discuss SNF options. SW sent additional SNF referrals, Pt is not appropriate for ARU. [facility] will not accept pts with 2x max assist.     SW following.   Electronically signed by RAHEEM Smith, GISSELLE on 1/18/2023 at 4:00 PM  [phone]

## 2023-01-18 NOTE — PROCEDURES
ENDURANCE EXTENDED DWELL CATHETER UPPER ARM MIDLINE PROCEDURE NOTE  Chart reviewed for allergies, diagnosis, labs, known contraindications, reason for line placement and planned length of treatment. Insertion procedure discussed with patient/family member. Informed consent not required for Midline placement. Three patient identifiers - Patient name,   and MRN -  completed &  confirmed verbally. Hat, mask and eye shield donned. Midline site scrubbed with Chloraprep  One-Step applicator  for 30 seconds x 1. Hand Hygiene  performed with 3% Chlorhexidine surgical scrub x1 min prior to  Sterile gloves donned. Patient draped using sterile barrier technique. Midline site scrubbed a 2nd time with Chloraprep One-Step applicator x 30 sec. Vein located by [employer] and site marked with sterile pen. Midline inserted. Positive brisk blood return obtained Midline flushed with 10 mls  0.9% Sterile Sodium Chloride. Midline flushes easily with no resistance. Valve placed on all lumens followed by Alcohol Swab Caps on end of each. Skin prep applied to site. Bio-patch in place. Catheter secured with non-sutured locking device per hospital protocol. Sterile Tegaderm applied over Midline site. Sterile field maintained during procedure. Guide wire accounted for post procedure. Pt. Response to procedure, tolerated well. Appearance of site: Clean dry and intact without bleeding or edema. All edges of Tegaderm occlusive. Site marked with date and initials of RN placing line. Top 2 side rails in up position, call button in reach, RN notified of all of the above. An Endurance Extended Dwell 20 gauge  6 CM length placed in the KENRICK  cephalic vein.

## 2023-01-19 LAB
A/G RATIO: 1.2 (ref 1.1–2.2)
ALBUMIN SERPL-MCNC: 3 G/DL (ref 3.4–5)
ALP BLD-CCNC: 101 U/L (ref 40–129)
ALT SERPL-CCNC: 13 U/L (ref 10–40)
ANION GAP SERPL CALCULATED.3IONS-SCNC: 7 MMOL/L (ref 3–16)
AST SERPL-CCNC: 19 U/L (ref 15–37)
BASOPHILS ABSOLUTE: 0 K/UL (ref 0–0.2)
BASOPHILS RELATIVE PERCENT: 0.4 %
BILIRUB SERPL-MCNC: 1 MG/DL (ref 0–1)
BUN BLDV-MCNC: 18 MG/DL (ref 7–20)
CALCIUM SERPL-MCNC: 8.9 MG/DL (ref 8.3–10.6)
CHLORIDE BLD-SCNC: 103 MMOL/L (ref 99–110)
CO2: 27 MMOL/L (ref 21–32)
CREAT SERPL-MCNC: 0.8 MG/DL (ref 0.6–1.2)
EOSINOPHILS ABSOLUTE: 0 K/UL (ref 0–0.6)
EOSINOPHILS RELATIVE PERCENT: 0 %
GFR SERPL CREATININE-BSD FRML MDRD: >60 ML/MIN/{1.73_M2}
GLUCOSE BLD-MCNC: 118 MG/DL (ref 70–99)
HCT VFR BLD CALC: 23.6 % (ref 36–48)
HEMOGLOBIN: 8 G/DL (ref 12–16)
LACTIC ACID: 1.2 MMOL/L (ref 0.4–2)
LYMPHOCYTES ABSOLUTE: 0.5 K/UL (ref 1–5.1)
LYMPHOCYTES RELATIVE PERCENT: 6 %
MAGNESIUM: 1.9 MG/DL (ref 1.8–2.4)
MCH RBC QN AUTO: 31.3 PG (ref 26–34)
MCHC RBC AUTO-ENTMCNC: 34.2 G/DL (ref 31–36)
MCV RBC AUTO: 91.6 FL (ref 80–100)
MONOCYTES ABSOLUTE: 1.1 K/UL (ref 0–1.3)
MONOCYTES RELATIVE PERCENT: 13 %
NEUTROPHILS ABSOLUTE: 6.6 K/UL (ref 1.7–7.7)
NEUTROPHILS RELATIVE PERCENT: 80.6 %
PDW BLD-RTO: 14.4 % (ref 12.4–15.4)
PHOSPHORUS: 2.8 MG/DL (ref 2.5–4.9)
PLATELET # BLD: 176 K/UL (ref 135–450)
PMV BLD AUTO: 8 FL (ref 5–10.5)
POTASSIUM SERPL-SCNC: 4.3 MMOL/L (ref 3.5–5.1)
RBC # BLD: 2.57 M/UL (ref 4–5.2)
SODIUM BLD-SCNC: 137 MMOL/L (ref 136–145)
TOTAL PROTEIN: 5.5 G/DL (ref 6.4–8.2)
VANCOMYCIN RANDOM: 13.5 UG/ML
WBC # BLD: 8.1 K/UL (ref 4–11)

## 2023-01-19 PROCEDURE — 80202 ASSAY OF VANCOMYCIN: CPT

## 2023-01-19 PROCEDURE — 97535 SELF CARE MNGMENT TRAINING: CPT

## 2023-01-19 PROCEDURE — 6360000002 HC RX W HCPCS: Performed by: INTERNAL MEDICINE

## 2023-01-19 PROCEDURE — 6370000000 HC RX 637 (ALT 250 FOR IP): Performed by: INTERNAL MEDICINE

## 2023-01-19 PROCEDURE — 36415 COLL VENOUS BLD VENIPUNCTURE: CPT

## 2023-01-19 PROCEDURE — 97530 THERAPEUTIC ACTIVITIES: CPT

## 2023-01-19 PROCEDURE — 1200000000 HC SEMI PRIVATE

## 2023-01-19 PROCEDURE — 2580000003 HC RX 258: Performed by: INTERNAL MEDICINE

## 2023-01-19 PROCEDURE — 6370000000 HC RX 637 (ALT 250 FOR IP): Performed by: STUDENT IN AN ORGANIZED HEALTH CARE EDUCATION/TRAINING PROGRAM

## 2023-01-19 PROCEDURE — 84100 ASSAY OF PHOSPHORUS: CPT

## 2023-01-19 PROCEDURE — 85025 COMPLETE CBC W/AUTO DIFF WBC: CPT

## 2023-01-19 PROCEDURE — 2580000003 HC RX 258: Performed by: STUDENT IN AN ORGANIZED HEALTH CARE EDUCATION/TRAINING PROGRAM

## 2023-01-19 PROCEDURE — 83605 ASSAY OF LACTIC ACID: CPT

## 2023-01-19 PROCEDURE — 83735 ASSAY OF MAGNESIUM: CPT

## 2023-01-19 PROCEDURE — 80053 COMPREHEN METABOLIC PANEL: CPT

## 2023-01-19 PROCEDURE — 87081 CULTURE SCREEN ONLY: CPT

## 2023-01-19 RX ORDER — LIDOCAINE 4 G/G
1 PATCH TOPICAL DAILY PRN
Status: DISCONTINUED | OUTPATIENT
Start: 2023-01-19 | End: 2023-01-23 | Stop reason: HOSPADM

## 2023-01-19 RX ORDER — FUROSEMIDE 10 MG/ML
20 INJECTION INTRAMUSCULAR; INTRAVENOUS ONCE
Status: COMPLETED | OUTPATIENT
Start: 2023-01-19 | End: 2023-01-19

## 2023-01-19 RX ADMIN — KETOROLAC TROMETHAMINE 15 MG: 15 INJECTION, SOLUTION INTRAMUSCULAR; INTRAVENOUS at 14:29

## 2023-01-19 RX ADMIN — FUROSEMIDE 20 MG: 10 INJECTION, SOLUTION INTRAMUSCULAR; INTRAVENOUS at 03:37

## 2023-01-19 RX ADMIN — AMIODARONE HYDROCHLORIDE 200 MG: 200 TABLET ORAL at 09:13

## 2023-01-19 RX ADMIN — LEVOTHYROXINE SODIUM 112 MCG: 0.11 TABLET ORAL at 05:37

## 2023-01-19 RX ADMIN — OXYCODONE HYDROCHLORIDE 10 MG: 5 TABLET ORAL at 09:11

## 2023-01-19 RX ADMIN — VANCOMYCIN HYDROCHLORIDE 1000 MG: 10 INJECTION, POWDER, LYOPHILIZED, FOR SOLUTION INTRAVENOUS at 17:03

## 2023-01-19 RX ADMIN — SODIUM CHLORIDE, PRESERVATIVE FREE 10 ML: 5 INJECTION INTRAVENOUS at 20:24

## 2023-01-19 RX ADMIN — OLANZAPINE 5 MG: 5 TABLET, FILM COATED ORAL at 20:21

## 2023-01-19 RX ADMIN — CEFEPIME HYDROCHLORIDE 2000 MG: 2 INJECTION, POWDER, FOR SOLUTION INTRAMUSCULAR; INTRAVENOUS at 12:51

## 2023-01-19 RX ADMIN — SODIUM CHLORIDE, POTASSIUM CHLORIDE, SODIUM LACTATE AND CALCIUM CHLORIDE: 600; 310; 30; 20 INJECTION, SOLUTION INTRAVENOUS at 18:50

## 2023-01-19 RX ADMIN — GABAPENTIN 100 MG: 100 CAPSULE ORAL at 09:13

## 2023-01-19 RX ADMIN — GABAPENTIN 100 MG: 100 CAPSULE ORAL at 20:21

## 2023-01-19 RX ADMIN — HEPARIN SODIUM 5000 UNITS: 5000 INJECTION INTRAVENOUS; SUBCUTANEOUS at 20:22

## 2023-01-19 RX ADMIN — FUROSEMIDE 20 MG: 10 INJECTION, SOLUTION INTRAMUSCULAR; INTRAVENOUS at 12:47

## 2023-01-19 RX ADMIN — ASPIRIN 81 MG: 81 TABLET, COATED ORAL at 09:11

## 2023-01-19 RX ADMIN — OXYCODONE HYDROCHLORIDE 10 MG: 5 TABLET ORAL at 17:06

## 2023-01-19 RX ADMIN — SODIUM CHLORIDE 20 ML/HR: 9 INJECTION, SOLUTION INTRAVENOUS at 12:50

## 2023-01-19 RX ADMIN — HEPARIN SODIUM 5000 UNITS: 5000 INJECTION INTRAVENOUS; SUBCUTANEOUS at 14:28

## 2023-01-19 RX ADMIN — PANTOPRAZOLE SODIUM 40 MG: 40 TABLET, DELAYED RELEASE ORAL at 05:37

## 2023-01-19 RX ADMIN — SODIUM CHLORIDE, PRESERVATIVE FREE 10 ML: 5 INJECTION INTRAVENOUS at 09:15

## 2023-01-19 RX ADMIN — GABAPENTIN 100 MG: 100 CAPSULE ORAL at 14:28

## 2023-01-19 RX ADMIN — ATORVASTATIN CALCIUM 10 MG: 10 TABLET, FILM COATED ORAL at 20:21

## 2023-01-19 RX ADMIN — HEPARIN SODIUM 5000 UNITS: 5000 INJECTION INTRAVENOUS; SUBCUTANEOUS at 05:41

## 2023-01-19 ASSESSMENT — PAIN - FUNCTIONAL ASSESSMENT
PAIN_FUNCTIONAL_ASSESSMENT: PREVENTS OR INTERFERES WITH MANY ACTIVE NOT PASSIVE ACTIVITIES
PAIN_FUNCTIONAL_ASSESSMENT: PREVENTS OR INTERFERES WITH MANY ACTIVE NOT PASSIVE ACTIVITIES
PAIN_FUNCTIONAL_ASSESSMENT: PREVENTS OR INTERFERES WITH ALL ACTIVE AND SOME PASSIVE ACTIVITIES

## 2023-01-19 ASSESSMENT — PAIN DESCRIPTION - PAIN TYPE
TYPE: SURGICAL PAIN;ACUTE PAIN
TYPE: ACUTE PAIN;SURGICAL PAIN

## 2023-01-19 ASSESSMENT — PAIN DESCRIPTION - LOCATION
LOCATION: HIP;LEG

## 2023-01-19 ASSESSMENT — PAIN DESCRIPTION - DESCRIPTORS
DESCRIPTORS: ACHING

## 2023-01-19 ASSESSMENT — PAIN SCALES - GENERAL
PAINLEVEL_OUTOF10: 8
PAINLEVEL_OUTOF10: 7
PAINLEVEL_OUTOF10: 9
PAINLEVEL_OUTOF10: 0
PAINLEVEL_OUTOF10: 0

## 2023-01-19 ASSESSMENT — PAIN DESCRIPTION - ORIENTATION
ORIENTATION: LEFT

## 2023-01-19 ASSESSMENT — PAIN DESCRIPTION - ONSET
ONSET: ON-GOING
ONSET: ON-GOING

## 2023-01-19 ASSESSMENT — PAIN DESCRIPTION - FREQUENCY
FREQUENCY: CONTINUOUS
FREQUENCY: CONTINUOUS

## 2023-01-19 NOTE — PLAN OF CARE
Patient remains free from falls and injury. No new skin issues noted. Problem: Safety - Adult  Goal: Free from fall injury  Outcome: Progressing     Problem: ABCDS Injury Assessment  Goal: Absence of physical injury  Outcome: Progressing     Problem: Skin/Tissue Integrity  Goal: Absence of new skin breakdown  Description: 1. Monitor for areas of redness and/or skin breakdown  2. Assess vascular access sites hourly  3. Every 4-6 hours minimum:  Change oxygen saturation probe site  4. Every 4-6 hours:  If on nasal continuous positive airway pressure, respiratory therapy assess nares and determine need for appliance change or resting period.   Outcome: Progressing

## 2023-01-19 NOTE — PROGRESS NOTES
Clinical Pharmacy Progress Note    Vancomycin - Management by Pharmacy    Consult Date(s): 1/17/23  Consulting Provider(s): Dr. Kay Lam / Plan  1)  Post-op fevers - Vancomycin  Concurrent Antimicrobials: Cefepime  Day of Vanc Therapy:  3  Current Dosing Method: Bayesian-Guided AUC Dosing  Therapeutic Goal: -600 mg/L*hr  Current Dose / Plan:   Currently on 1250mg IV q24h. Random level this AM = 13.5mcg/mL. Calculated AUC = 564 with trough = 17.4. As AUC is on higher end of desired range (and higher than predicted), will decrease dose to 1000mg q24h. New regimen predicts an AUC = 456 with trough = 14. Will plan for repeat level in 2-3 days (or sooner if clinically indicated). Will continue to monitor clinical condition and make adjustments to regimen as appropriate. Please call with questions--  Thanks--  Vincent Conley, PharmD, BCPS, BCGP  [id number] ([facility])   1/19/2023 8:26 AM      Interval update:  Tmax 99.4 last 24 hrs. WBC now WNL. Subjective/Objective:   Jose A Miguel is a 80 y.o. female with a PMHx significant for HLD, OA, DVT, hypothyroidism, spinal stenosis, and b/l breast cancer who is admitted with left femur fracture. Pt is s/p left hip intertrochoanteric nailing (done 1/12/23). Post-op course has included treatment for encephalopathy / post-op delirium, new onset A.fib with RVR, and urinary retention. Broad spectrum antibiotics were started 1/17 for post-op fevers.     Pharmacy is consulted to dose Vancomycin    Ht Readings from Last 1 Encounters:   01/12/23 5' 1\" (1.549 m)     Wt Readings from Last 1 Encounters:   01/19/23 153 lb 7 oz (69.6 kg)     Current & Prior Antimicrobial Regimen(s):   (1/12-1/13)  Cefepime 2000mg EI q12h (1/17-current)  Vancomycin - Pharmacy to dose  1250mg IV q24h (1/17-1/18)  1000mg IV q24h (1/18-current)    Vancomycin Level(s) / Doses:    Date Time Dose Type of Level / Level Interpretation   1/19 05:40 1250 mg IV q24h Random = 13.5mcg/mL Drawn ~13.5h after 2nd dose  Calculated AUC = 564 with trough = 17.4  Decrease to 1000mg q24h          Note: Serum levels collected for AUC-based dosing may be high if collected in close proximity to the dose administered. This is not necessarily indicative of toxicity. Cultures & Sensitivities:    Date Site Micro Susceptibility / Result   1/17 Blood x2 No growth to date    1/19 MRSA screening cx sent      Recent Labs     01/17/23  1110 01/18/23  0905 01/19/23  0540   CREATININE 0.8 0.7 0.8   BUN 20 18 18   WBC 13.3* 7.4 8.1         Estimated Creatinine Clearance: 40 mL/min (based on SCr of 0.8 mg/dL).     Additional Lab Values / Findings of Note:    Recent Labs     01/17/23  1110   PROCAL 0.41*

## 2023-01-19 NOTE — PLAN OF CARE
Problem: Safety - Adult  Goal: Free from fall injury  1/19/2023 1535 by Mary Thapa, RN  Note: She is a fall risk. Door open, and bed alarm on. Camera in room. Problem: Skin/Tissue Integrity  Goal: Absence of new skin breakdown  Description: 1. Monitor for areas of redness and/or skin breakdown  2. Assess vascular access sites hourly  3. Every 4-6 hours minimum:  Change oxygen saturation probe site  4. Every 4-6 hours:  If on nasal continuous positive airway pressure, respiratory therapy assess nares and determine need for appliance change or resting period. 1/19/2023 1535 by Mary Thapa, RN  Note: Dressing to left thigh and hip. Bruising all along left thigh. She resists moving and turning. Heels elevated in bed and chair. Turned as patient will allow. Problem: Musculoskeletal - Adult  Goal: Return mobility to safest level of function  Note: Up to chair today with therapy. Will need [facility], and assist of 2 for transfer. Problem: Discharge Planning  Goal: Discharge to home or other facility with appropriate resources  Note: She will need placement. Case management involved. Problem: Pain  Goal: Verbalizes/displays adequate comfort level or baseline comfort level  Note: Medicated for pain with oxycodone, and toradol. Patient is pretty comfortable, until she moves, then she starts screaming.

## 2023-01-19 NOTE — PROGRESS NOTES
Hospitalist Progress Note      PCP: Melissa oLu    Date of Admission: 1/12/2023    Chief Complaint: worsening lethargy      Subjective:     Was seen and examined  Complain of left hip pain  Started on lidocaine patch     Last fever 102.3 F on 1/17. Not tachycardic. Started IV vancomycin, cefepime on 1/17. Restarted LR infusion at 75 cc/hr, decrease to 60 cc.hr.      She awake. She is not confused and speaking calmy. She denies lightheadedness, shortness of breath, chest pain, palpitations. .  She denies nausea, abdominal pain, dysuria, diarrhea.       Medications:  Reviewed    Infusion Medications    lactated ringers 60 mL/hr at 01/18/23 0645    sodium chloride 25 mL/hr at 01/13/23 0932     Scheduled Medications    furosemide  20 mg IntraVENous Once    vancomycin  1,000 mg IntraVENous Q24H    cefepime  2,000 mg IntraVENous Q12H    aspirin  81 mg Oral Daily    amiodarone  200 mg Oral Daily    [Held by provider] metoprolol succinate  12.5 mg Oral Daily    heparin (porcine)  5,000 Units SubCUTAneous 3 times per day    OLANZapine  5 mg Oral Nightly    lidocaine  2 patch TransDERmal Daily    gabapentin  100 mg Oral TID    sodium chloride flush  5-40 mL IntraVENous 2 times per day    levothyroxine  112 mcg Oral QAM AC    atorvastatin  10 mg Oral Nightly    pantoprazole  40 mg Oral QAM AC    midazolam  0.5 mg IntraVENous Once    sodium chloride flush  5-40 mL IntraVENous 2 times per day     PRN Meds: lidocaine, ketorolac, perflutren lipid microspheres, sodium chloride flush, ondansetron **OR** ondansetron, polyethylene glycol, acetaminophen **OR** acetaminophen, clonazePAM, oxyCODONE **OR** oxyCODONE, simethicone, sodium chloride flush, sodium chloride      Intake/Output Summary (Last 24 hours) at 1/19/2023 0906  Last data filed at 1/19/2023 0655  Gross per 24 hour   Intake 508.85 ml   Output 3300 ml   Net -2791.15 ml         Physical Exam Performed:    /67   Pulse 91   Temp 98.4 °F (36.9 °C) (Oral)   Resp 18   Ht 5' 1\" (1.549 m)   Wt 153 lb 7 oz (69.6 kg)   SpO2 92%   BMI 28.99 kg/m²     GEN more fatigued. HEENT normocephalic, anicteric sclera, EOMI, mucosa moist, no stridor  NECK supple, trachea midline  RESP on 2-3LPM, in no distress, clear to auscultation anteriorly, decreased at right base  CARDS RRR, S1, S2, systolic murmurs over RUSB, LUSB, left>right upper extremity edema, left thigh edema and bilateral leg edema, radial pulse 2+, DP pulse 2+  ABD +BS, soft nontender   marrero catheter in place  MSK left hip tenderness, edema, no cyanosis, no clubbing  SKIN warm, dry  NEURO some fatigue, oriented x 3, no facial asymmetry, no dysarthria, moving spontaneously  PSYCH normal mood      Labs:   Recent Labs     01/17/23  1110 01/18/23  0905 01/19/23  0540   WBC 13.3* 7.4 8.1   HGB 9.3* 8.4* 8.0*   HCT 27.5* 24.1* 23.6*    150 176       Recent Labs     01/17/23  1110 01/18/23  0905 01/19/23  0540   * 132* 137   K 4.6 4.2 4.3    101 103   CO2 22 24 27   BUN 20 18 18   CREATININE 0.8 0.7 0.8   CALCIUM 8.8 8.7 8.9   PHOS 4.2 3.1 2.8       Recent Labs     01/17/23  1110 01/18/23  0905 01/19/23  0540   AST 22 16 19   ALT 15 11 13   BILITOT 1.1* 1.1* 1.0   ALKPHOS 79 77 101       No results for input(s): INR in the last 72 hours. No results for input(s): Lesli Parisian in the last 72 hours. Urinalysis:      Lab Results   Component Value Date/Time    NITRU Negative 01/17/2023 01:22 PM    [address] None seen 01/17/2023 01:22 PM    BACTERIA 1+ 01/17/2023 01:22 PM    RBCUA None seen 01/17/2023 01:22 PM    BLOODU Negative 01/17/2023 01:22 PM    SPECGRAV 1.025 01/17/2023 01:22 PM    GLUCOSEU Negative 01/17/2023 01:22 PM       Radiology:  CT HEAD WO CONTRAST   Final Result      Chronic ischemic changes of the brain and atrophy with no midline shift or hydrocephalus or definitive hemorrhage.  Extensive artifact noted likely from patient motion            XR TIBIA FIBULA LEFT (2 VIEWS)   Final Result      Normal radiographs of the left tibia and fibula. Knee compartment narrowing appreciated. Left ankle 3 view:      HISTORY: Trauma:      FINDINGS:      AP lateral oblique views demonstrate normal alignment. There is no acute fracture or dislocation      IMPRESSION:      No acute fracture       LEFT HIP 2 VIEW , AP view pelvis      HISTORY: [place]asha[place] with hip pain, previous left hip pinning      PROCEDURE: AP view the pelvis and lateral view of left hip were obtained      COMPARISON: Intraoperative studies from January 12, 2023      FINDINGS:      There is normal alignment of the right hip with 4 screws noted traversing the trochanter femoral neck and head from previous fracture fixation without significant displacement, unchanged. Left trochanteric hip fixation is intact with transfixation screws through the trochanter femoral neck and long intramedullary amaris remaining in stable position compared to recent study. No discrete acute fracture or dislocation. No expansile cystic or destructive change identified      AP view of the pelvis demonstrates no acute bony defect. Diffuse demineralization noted. Degenerative changes lower lumbar spine. The sacroiliac margins appear normal.    There is some minimal sclerosis along the acetabulum      IMPRESSION:      No acute deformity involving the right or left hip. Previous pinning of the right hip is unchanged. Previous fracture fixation of the left trochanteric and hip is intact      No acute bony defect in the pelvis. Diffuse demineralization            XR SHOULDER LEFT (MIN 2 VIEWS)   Final Result      Chronic advanced arthropathy of the left shoulder with loose bodies and subarticular erosions affecting the humeral head and advanced glenohumeral arthritic change and remodeling         XR ANKLE LEFT (2 VIEWS)   Final Result      Normal radiographs of the left tibia and fibula. Knee compartment narrowing appreciated.       Left ankle 3 view:      HISTORY: Trauma:      FINDINGS:      AP lateral oblique views demonstrate normal alignment. There is no acute fracture or dislocation      IMPRESSION:      No acute fracture       LEFT HIP 2 VIEW , AP view pelvis      HISTORY: [place] with hip pain, previous left hip pinning      PROCEDURE: AP view the pelvis and lateral view of left hip were obtained      COMPARISON: Intraoperative studies from January 12, 2023      FINDINGS:      There is normal alignment of the right hip with 4 screws noted traversing the trochanter femoral neck and head from previous fracture fixation without significant displacement, unchanged. Left trochanteric hip fixation is intact with transfixation screws through the trochanter femoral neck and long intramedullary amaris remaining in stable position compared to recent study. No discrete acute fracture or dislocation. No expansile cystic or destructive change identified      AP view of the pelvis demonstrates no acute bony defect. Diffuse demineralization noted. Degenerative changes lower lumbar spine. The sacroiliac margins appear normal.    There is some minimal sclerosis along the acetabulum      IMPRESSION:      No acute deformity involving the right or left hip. Previous pinning of the right hip is unchanged. Previous fracture fixation of the left trochanteric and hip is intact      No acute bony defect in the pelvis. Diffuse demineralization            XR KNEE RIGHT (1-2 VIEWS)   Final Result      1. Right knee: Advanced arthritic change as described above. No sign of any fracture or acute deformity. 2. Left knee: Moderate degenerative change particularly involving the lateral compartment with some prepatellar soft tissue swelling. No acute defect      XR HIP 2-3 VW W PELVIS LEFT   Final Result      Normal radiographs of the left tibia and fibula. Knee compartment narrowing appreciated.       Left ankle 3 view:      HISTORY: Trauma: FINDINGS:      AP lateral oblique views demonstrate normal alignment. There is no acute fracture or dislocation      IMPRESSION:      No acute fracture       LEFT HIP 2 VIEW , AP view pelvis      HISTORY: [place] [employer] with hip pain, previous left hip pinning      PROCEDURE: AP view the pelvis and lateral view of left hip were obtained      COMPARISON: Intraoperative studies from January 12, 2023      FINDINGS:      There is normal alignment of the right hip with 4 screws noted traversing the trochanter femoral neck and head from previous fracture fixation without significant displacement, unchanged. Left trochanteric hip fixation is intact with transfixation screws through the trochanter femoral neck and long intramedullary amaris remaining in stable position compared to recent study. No discrete acute fracture or dislocation. No expansile cystic or destructive change identified      AP view of the pelvis demonstrates no acute bony defect. Diffuse demineralization noted. Degenerative changes lower lumbar spine. The sacroiliac margins appear normal.    There is some minimal sclerosis along the acetabulum      IMPRESSION:      No acute deformity involving the right or left hip. Previous pinning of the right hip is unchanged. Previous fracture fixation of the left trochanteric and hip is intact      No acute bony defect in the pelvis. Diffuse demineralization            XR KNEE LEFT (1-2 VIEWS)   Final Result      1. Right knee: Advanced arthritic change as described above. No sign of any fracture or acute deformity. 2. Left knee: Moderate degenerative change particularly involving the lateral compartment with some prepatellar soft tissue swelling. No acute defect      XR HIP LEFT (2-3 VIEWS)   Final Result      Intraoperative fluoroscopy for open reduction internal fixation of the left hip. Refer to the operative note for details.       FLUORO FOR SURGICAL PROCEDURES   Final Result      Intraoperative fluoroscopy for open reduction internal fixation of the left hip. Refer to the operative note for details. CT CHEST ABDOMEN PELVIS W CONTRAST Additional Contrast? None   Final Result      CHEST:   1.  Small right pleural effusion and trace left pleural effusion. Mild bibasilar atelectasis. 2.  Stable remote compression deformities at T12 and L1 compared to prior MRI from 2015. There is also new compression deformity at T11 compared to the study, but this is age indeterminant and may also be remote. Recommend correlation with point    tenderness in this level. 3.  Motion limited exam without definite evidence of acute traumatic intrathoracic abnormality. 4.  Moderate hiatal hernia. ABDOMEN AND PELVIS:   1.  Mildly displaced comminuted intratrochanteric fracture of the left femur. 2.  Surrounding stranding/hematoma in the left thigh as detailed above. 3.  No additional acute traumatic abnormality identified in the abdomen and pelvis per         CT CERVICAL SPINE WO CONTRAST   Final Result   1. No acute traumatic abnormality of the cervical spine. 2. Stable multilevel degenerative disc disease and facet arthropathy. 3. Stable slight rotatory subluxation of C1 on C2 on the right side. CT HEAD WO CONTRAST   Final Result      1. Stable exam with no acute intracranial abnormality. XR FEMUR LEFT (MIN 2 VIEWS)   Final Result   Addendum (preliminary) 1 of 1   [ADDENDUM #1   Addendum: Initial report by Dr. Bruna Crespo. Addendum by Dr. Piotr Avina. Frontal view of the pelvis and frontal and frog-leg lateral views of the    left femur were obtained. There is a nondisplaced intertrochanteric    fracture of the left femur. No dislocation. Orthopedic hardware of the    proximal right femur is noted. No    additional fracture deformity. Final   Nondisplaced intertrochanteric fracture on the left. IMPRESSION:      No pneumothorax.       Diffusely prominent interstitial markings presumably representing chronic interstitial lung disease. No prior for comparison. Normal cardiomediastinal silhouette. Presumed old fracture deformity of the proximal left humerus. If there is left shoulder pain then dedicated radiographs are indicated. XR PELVIS (1-2 VIEWS)   Final Result   Addendum (preliminary) 1 of 1   [ ADDENDUM #1 *   Addendum: Initial report by Dr. Talat Guzman. Addendum by Dr. Luanne Calix. Frontal view of the pelvis and frontal and frog-leg lateral views of the    left femur were obtained. There is a nondisplaced intertrochanteric    fracture of the left femur. No dislocation. Orthopedic hardware of the    proximal right femur is noted. No    additional fracture deformity. Final   Nondisplaced intertrochanteric fracture on the left. IMPRESSION:      No pneumothorax. Diffusely prominent interstitial markings presumably representing chronic interstitial lung disease. No prior for comparison. Normal cardiomediastinal silhouette. Presumed old fracture deformity of the proximal left humerus. If there is left shoulder pain then dedicated radiographs are indicated. XR CHEST PORTABLE   Final Result   Addendum (preliminary) 1 of 1   [ADDENDUM #1    Addendum: Initial report by Dr. Talat Guzman. Addendum by Dr. Luanne Calix. Frontal view of the pelvis and frontal and frog-leg lateral views of the    left femur were obtained. There is a nondisplaced intertrochanteric    fracture of the left femur. No dislocation. Orthopedic hardware of the    proximal right femur is noted. No    additional fracture deformity. Final   Nondisplaced intertrochanteric fracture on the left. IMPRESSION:      No pneumothorax. Diffusely prominent interstitial markings presumably representing chronic interstitial lung disease. No prior for comparison. Normal cardiomediastinal silhouette. Presumed old fracture deformity of the proximal left humerus.  If there is left shoulder pain then dedicated radiographs are indicated. Assessment/Plan: This is a 79 yo Female, with history of bilateral breast cancer, s/p left mastectomy 1975, right lumpectomy and radiation in 1999, DVT 2009 while on Evista, hypothyroidism, esophageal spasm, schatzki's ring, EGD with dilation 1/2011, GERD, hepatitis B, OA, spinal stenosis, Rt Hip Fracture s/p surgery 2/2010, Thoracic and Lumbar compression fractures, who presents for evaluation after fall. She was found to have sustained mildly displaced comminuted intratrochanteric fracture of the left femur. Patient is alert, oriented, and coherent. Daughter in-law, Brock Ibarra, is present and assists with clinical history. This morning patient walked from her bedroom to the bathroom. When she reached the bathroom, she felt lightheaded. States that she must have turned around, lost her balance, and fell onto her left side. After falling she was in significant pain and was unable to rise on her own. Patient has a  who comes every morning to walk her dog at ~ 730 AM.    Knowing that her  was coming, patient screamed out for help. Reportedly she was found likely on her left side on the bathroom floor. Her  called EMS and patient was taken to [facility], [facility]. for evaluation. Patient has been in her usual state of health. She walks about 1 mile daily without assistance. She used to walk longer distances. Patient denies recent fevers, chills, cough, shortness of breath, chest pain, palpitations at rest and on exertion. She denies history of nausea, vomiting, abdominal pain, dysuria, diarrhea. She eats three meals daily. States that she does not eat much during lunchtime. Daughter in-law, Brock Ibarra said patient had another fall in February 2022 while walking her dog; she was found on the side walk. After, she has been working with PT/OT to work on her strength and stability.   She may have fallen a couple of times after. Active Hospital Problems    Diagnosis Date Noted    Closed fracture of left hip ([facility] [address] [S72.002A] 01/16/2023     Priority: Medium    Other fracture of left femur, initial encounter for closed fracture [facility]) [S72.8X2A] 01/12/2023     Priority: Medium     PostOp Fever  Possible Pneumonia  - fever up to 102.3 F.  - new leukocytosis with WBC 13.3K with left shift on 1/17/23.  - Procalcitonin 0.41, from 0.19 on 1/13/23.  - confusion.  - UA 1/17 showed trace protein, no nitrite, no leuk est, WBC not seen. - blood cultures 1/17 negative to date. - CXR 1/17 showed bilateral pleural-parenchymal opacities suggesting moderate large pleural effusions and airspace disease, right greater than left. Pulmonary edema suspected. Pneumonia not excluded. No pneumothorax. - Started IV vancomycin, cefepime (1/17-). - MRSA nasal swab. D/c vancomycin of MRSA screen negative     Encephalopathy, Post-op Delirium, Fevers, Possible Pneumonia  - became confused on 1/14 and pulled out IV's. - had to place in soft restraints for safety. - in the setting of post-op and elderly age. Adequate hydration and pain control.  - placed in restraints on 1/14-1/15,  - mental clear and oriented x 3 on 1/15. She was understanding about being in restraints so that she would not inadvertently pull on her IV lines. - off restraints on 1/16. New Onset Atrial Fibrillation with RVR on 1//14  - RVR with HR to 160's and became hypotensive to SBP 70's in the setting of severe AS  - Received 1L NS bolus. - Received amiodarone bolus 150 mg (1/14), then started infusion of 1 mg/min for 6 hours, followed by 0.5 mg/min. -- she converted back NSR after amiodarone bolus and start of 1mg/min infusion. -- consider anticoagulation in the setting valvular disease, however, she is 79 yo. The onset of atrial fibrillation, converted to NSR < 48 hours. - Monitor and discuss with family. - start ASA 81 mg daily.   Still contemplating AC.  - Cardiology evaluated patient and started on amiodarone 200 mg daily. Acute Hypoxic Respiratory Failure  Bilateral Pleural Effusion  ? Pulmonary Edema vs Pneumonia  - in the setting right pleural effusion and possible pneumonia. - CT Chest 1/12 showed small right pleural effusion, trace left pleural effusion. Mild basilar atelectasis. - CXR (portable) 1/17 showed moderate to large pleural effusions and airspace disease in right > left lungs, favoring pulmonary edema. Pneumonia not excluded. - diuresis as below in Edema section. On 3 L O2   May order CT chest if no improvement       Edema  - in the setting of immobility and severe AS, receiving low volume IVF's for borderline BP.  - start IV lasix (1/18-) with low rate IVF's to achieve diuresis in the setting of borderline BP and to protect her kidney. Severe Aortic Stenosis, Symptomatic  Borderline BP  LV with Grade II Diastolic Dysfunction  Mild to Moderate Mitral Regurgitation  Mild to Moderate Mitral Stenosis  - ECHO 1/12/23 showed normal LV cavity size, wall thickness, LVEF 60-65%, no regional wall motion abnormalities. Grade II diastolic dysfunction. Mitral valve leaflets are thickened/calcified. MAC. Mild to moderate MR and MS. Severe aortic stenosis with peak velocity of 5.3 m/s, mean pressure gradient of 66 mmHg. Mild TR. PSAP 48 mmHg. - Reports lightheadedness on rising. When she fell on 1/12 -- when she walked to the bathroom she was not initially lightheaded; after reaching the bathroom she felt lightheaded. She denies shortness of breath, chest pain, palpitations on exertion. - cardiology consulted for consideration of TAVR. Patient extremely functional; walks 1 mile daily without assistive device. - Cardiology evaluated patient. Recommended outpatient evaluation for TAVR.     Mildly Displaced Comminuted Intra-trochanteric Fracture of Left Femur  - CT A/P with contrast 1/12/23 showed mildly displaced comminuted intratrochanteric fracture of the left femur. Surrounding stranding/hematoma in the left thigh as detailed above. - s/p ORIF of left femoral fracture with CMN on 1/12/23  - monitor for post-op complications. - Pain control -- sensitive to narcotics. - Bowel regimen. Add lidocaine patch      Recurrent Falls  Lightheadedness  - walks 1 mile daily without assistive device. - Had a fall in February 2022 while walking her dog.  - she may have fallen a couple more times after, then today 1/12. -- prior to falling today -- she felt lightheaded, turned and lost her balance resulting in her fall. -- adm labs showed elevated lactic acid and ECHO showed severe aortic stenosis. - CT of Head wo contrast 1/12 showed no acute intracranial process. - CT Cervical Spine wo contrast 1/12 showed no acute traumatic abnormality of the cervical spine. Stable multilevel degenerative disc disease and facet arthropathy. Stable slight rotatory subluxation of C1 on C2 on the right side.  - B12 636, folate >20, vitamin D5OH 22.8 on 1/13/23.  - Started vitamin D3 2000 units daily. Elevated Lactic Acid  - adm lactic acid 3.0.  - Administered 2 L LR, then started LR infusion 75 cc/hr. - lactic acid normalized after receiving IVF's. Small Right Pleural Effusion  Trace Left Pleural Effusion  - in the setting of valvular disease and LV with grade II diastolic dysfunction,    Urinary Retention  - bladder scan showed > 500 cc urine.    - marrero placed on 1/12. Rt Upper Extremity Acute Totally Occluding Superficial Venous Thrombosis of Cephalic Vein From Wrist to Mid Forearm in the setting of IV lines, patient also pulled out IV's. - on Venous US of UE on 1/17.  - warm compresses. - distal superficial thrombus. - supportive care.      Hypothyroidism  - TSH 1.15, free T4 1.1 on 1/13/23.  - Continue home levothyroxine 112 mcg daily.  - TSH, free T4 (pharmacy verified that she has not filled her prescription in several months.)  - Recheck TFT in 1 month. Remote Compression Deformities at T12, L1  New Compression Deformity at T11  OA  - vitamin D25OH level 22.8 on 1/13/23.  - start vitamin D3 2000 units daily. History of Rt Hip Fracture   - s/p surgery 2/2010     History of bilateral breast cancer  - s/p left mastectomy 1975  - s/p right lumpectomy and radiation in 1999     DVT 2009   - thought possible related to Evista  - DVT ppx. Moderate Hiatal Hernia  GERD  History of Esophageal spasm  History of Schatzki's ring  - S/p EGD with dilation 1/2011  - Continue home omeprazole 40 mg daily. History of Hepatitis B       DVT Prophylaxis:   Diet: ADULT ORAL NUTRITION SUPPLEMENT; Breakfast, Lunch, Dinner; Standard High Calorie/High Protein Oral Supplement  ADULT DIET; Regular; Low Fat/Low Chol/High Fiber/2 gm Na  Code Status: Full Code    PT/OT Eval Status: PT/OT    Dispo - once medically stable.     Ramona Bañuelos MD

## 2023-01-19 NOTE — CARE COORDINATION
TREV met w/Pt and Pt's son alexis at bedside. Efrem Plata also had Sammie on the phone. They family would like to move forward w/SNF placement. They would like a referral to [facility]. TREV provided SNF list to alexis and emailed it to Fabio at [email or web address]. TREV called admissions at [facility] will send out the referral for review and call TREV back. TREV following.   Electronically signed by RAHEEM Pop, LSW on 1/19/2023 at 11:21 AM  [phone]

## 2023-01-19 NOTE — PROGRESS NOTES
Occupational Therapy  Facility/Department: [address]  Occupational Therapy Treatment Note     Name: Yudy Michael  : 1930  MRN: [id number]  Date of Service: 2023    Discharge Recommendations: Yudy Michael scored a  on the AM-PAC ADL Inpatient form. Current research shows that an AM-PAC score of 17 or less is typically not associated with a discharge to the patient's home setting. Based on the patient's AM-PAC score and their current ADL deficits, it is recommended that the patient have 3-5 sessions per week of Occupational Therapy at d/c to increase the patient's independence. Please see assessment section for further patient specific details. If patient discharges prior to next session this note will serve as a discharge summary. Please see below for the latest assessment towards goals. OT Equipment Recommendations  Equipment Needed: No  Other: defer to [facility] care facility       Patient Diagnosis(es): The encounter diagnosis was Closed fracture of left hip, initial encounter ([facility] [address] Past Medical History:  has a past medical history of Cancer ([facility] [address] Hepatitis, Hyperlipidemia, and Osteoarthritis. Past Surgical History:  has a past surgical history that includes Mastectomy; Breast lumpectomy;  section; and hip surgery (Left, 2023). Treatment Diagnosis: impaired ADLs/ functional transfers and mobility 2/2 L femur fx      Assessment   Performance deficits / Impairments: Decreased functional mobility ; Decreased ADL status; Decreased safe awareness;Decreased endurance  Assessment: Pt continues with ongoing deficits s/p fall and IM nailing. Pt anxious throughout session but calms well with mod/max emotional support. Pt needing freq rest breaks between activity 2/2 anxiety. Pt needing Max A x 2  for all functional mobility/ transfer attempts. Pt able to participate in light grooming at EOB. Pt would benefit from ongoing inpt OT at d/c.   Will follow as inpt.  Treatment Diagnosis: impaired ADLs/ functional transfers and mobility 2/2 L femur fx  Prognosis: Fair  REQUIRES OT FOLLOW-UP: Yes  Activity Tolerance  Activity Tolerance: Patient limited by fatigue;Patient Tolerated treatment well  Activity Tolerance Comments: Pt anxious throughout session. Does well with emotional support. Pt on 3L o2. Tolerated OOB to chair this date. Plan   Occupational Therapy Plan  Times Per Week: 5-7x  Times Per Day: Once a day  Current Treatment Recommendations: Safety education & training, Self-Care / ADL, Patient/Caregiver education & training, Endurance training, Functional mobility training     Restrictions  Position Activity Restriction  Other position/activity restrictions: WBAT    Subjective   General  Chart Reviewed: Yes  Patient assessed for rehabilitation services?: Yes  Additional Pertinent Hx: Admit 1/12 with fall + L femur fx on films    ortho consult to OR 1/12 s/p LEFT HIP INTERTROCHANTERIC NAILING                                            PMHX: bilateral breast cancer, s/p left mastectomy 1975, right lumpectomy and radiation in 1999, DVT,hypothyroidism, esophageal spasm, Schatzki's ring, GERD, hepatitis B, OA, spinal stenosis, Rt Hip Fx 2/2010, Thoracic and Lumbar compression fractures  Family / Caregiver Present: Yes Sokaran Jacobo left at start of session.)  Diagnosis: L Femur fx s/p LEFT HIP INTERTROCHANTERIC NAILING  Subjective  Subjective: \" I just don't want to be jerked all over the place\" pt anxious and needing Mod/ max encouragement at times.      Social/Functional History  Social/Functional History  Lives With: Alone  Type of Home: Condo  Home Layout: One level  Home Access: Stairs to enter with rails  Entrance Stairs - Number of Steps: 5  Entrance Stairs - Rails: Right  Bathroom Shower/Tub: Walk-in shower  Bathroom Toilet: Standard  Bathroom Equipment: Grab bars in shower, Grab bars around toilet  Home Equipment: None  Has the patient had two or more falls in the past year or any fall with injury in the past year?: Yes (says her balance is poor)  Receives Help From: Family  ADL Assistance: Independent  Homemaking Assistance: Independent  Ambulation Assistance: Independent  Transfer Assistance: Independent  Active : Yes  Mode of Transportation: Car       Objective     Safety Devices  Type of Devices: Chair alarm in place;Call light within reach;Nurse notified; Left in chair     Toilet Transfers  Toilet Transfer: Maximum assistance;Dependent/Total;2 Person assistance  Toilet Transfers Comments: simulated from bed to chair -- via Elizabeth Sanders     ADL  Feeding: Supervision;Setup; Increased time to complete; Beverage management  Grooming: Contact guard assistance; Increased time to complete;Minimal assistance  Grooming Skilled Clinical Factors: with hair -- wiped face with setup  LE Dressing: Dependent/Total  LE Dressing Skilled Clinical Factors: with socks-- very resistive to lifting LLE off bed. Toileting: Dependent/Total  Toileting Skilled Clinical Factors: continues with marrero cath / incont of stool at times - RN aware     Activity Tolerance  Activity Tolerance Comments: Patient continues to have a lot of anxiety and fear of moving. Pain is limiting factor along with the high level of anxiety associated with the fear of pain. Patient says she is not in any pain laying supine at beginning of session but is very resistant to participate due to potential for increase in pain. Appears to tolerate treatment well physically on 3LO2 with minimal complaints of nausea.   Bed mobility  Supine to Sit: Dependent/Total;Maximum assistance;2 Person assistance  Scooting: Maximal assistance  Bed Mobility Comments: Patient very anxious to mobilize; fearful with any touch, initiation of movement; needed a lot of encouragement  Transfers  Sit to stand: Dependent/Total;Maximum assistance;2 Person assistance  Stand to sit: Maximum assistance;Dependent/Total;2 Person assistance  Transfer Comments: x 2 attempts from EOB.  Vision  Vision: Within Functional Limits  Hearing  Hearing: Within functional limits  Cognition  Following Commands: Follows one step commands with increased time;Follows one step commands with repetition  Attention Span: Attends with cues to redirect;Difficulty attending to directions  Memory: Decreased short term memory;Decreased recall of recent events  Safety Judgement: Decreased awareness of need for assistance  Insights: Decreased awareness of deficits  Initiation: Requires cues for some  Sequencing: Requires cues for some  Cognition Comment: anxious regarding mobility attempts.  Needing freq encouragement and reassurance throughout session  Orientation  Overall Orientation Status: Within Functional Limits  Orientation Level: Oriented to place;Oriented to person;Oriented to situation (still foggy regarding time)      Education Given To: Patient  Education Provided: Role of Therapy;Plan of Care;IADL Safety;Precautions;ADL Adaptive Strategies;Transfer Training  Education Method: Demonstration;Verbal  Barriers to Learning: Other (Comment) (anxiety)  Education Outcome: Continued education needed     AM-PAC Score  AM-PAC Inpatient Daily Activity Raw Score: 11 (01/19/23 1207)  AM-PAC Inpatient ADL T-Scale Score : 29.04 (01/19/23 1207)  ADL Inpatient CMS 0-100% Score: 70.42 (01/19/23 1207)  ADL Inpatient CMS G-Code Modifier : CL (01/19/23 1207)    Goals  Short Term Goals  Time Frame for Short Term Goals: at d/c  Short Term Goal 1: Stance x 4 mins with CGA x 1 for ADLs/ IADLs- not met  Short Term Goal 2: Functional transfers with CGA-- not met  Short Term Goal 3: LE dressing with MIn A and AE prn - not met  Patient Goals   Patient goals : Be able to go home soon     Therapy Time   Individual Concurrent Group Co-treatment   Time In 1108         Time Out 1201         Minutes 53             Timed Code Treatment Minutes:  53 mins     Total Treatment Minutes:  53 mins       Jen Oliver  OT

## 2023-01-19 NOTE — PROGRESS NOTES
Physical Therapy  Facility/Department: [address] [place] [address]  Physical Therapy Daily Treatment    Name: Jacob Nunes  : 1930  MRN: [id number]  Date of Service: 2023    Discharge Recommendations: Jacob Nunes scored a / on the AM-PAC short mobility form. Current research shows that an AM-PAC score of 17 or less is typically not associated with a discharge to the patient's home setting. Based on the patient's AM-PAC score and their current functional mobility deficits, it is recommended that the patient have 3-5 sessions per week of Physical Therapy at d/c to increase the patient's independence. Please see assessment section for further patient specific details. If patient discharges prior to next session this note will serve as a discharge summary. Please see below for the latest assessment towards goals. Patient Diagnosis(es): The encounter diagnosis was Closed fracture of left hip, initial encounter ([facility] [address] Past Medical History:  has a past medical history of Cancer ([facility] [address] Hepatitis, Hyperlipidemia, and Osteoarthritis. Past Surgical History:  has a past surgical history that includes Mastectomy; Breast lumpectomy;  section; and hip surgery (Left, 2023). Assessment   Assessment: Pain and fear of movement continue to be the patient's biggest barriers. She is tolerating sitting EOB without increase in pain but transfer process remains a challenge due to fear and limited participation. With max cueing, encouragement, and education, patient will participate in session and has good support from family. She will benefit from continued skilled therapy following discharge to increase strength and functional mobility in order to progress toward increased independence at home when appropriate. Will continue to follow-up with her while admitted to progress mobility as tolerated.   Treatment Diagnosis: decreased functional mobility  Requires PT Follow-Up: Yes  Activity Tolerance  Activity Tolerance Comments: Patient continues to have a lot of anxiety and fear of moving. Pain is limiting factor along with the high level of anxiety associated with the fear of pain. Patient says she is not in any pain laying supine at beginning of session but is very resistant to participate due to potential for increase in pain. Appears to tolerate treatment well physically on 3LO2 with minimal complaints of nausea. Plan   Physcial Therapy Plan  General Plan: 5-7 times per week  Current Treatment Recommendations: Transfer training, Functional mobility training, Gait training, Strengthening  Safety Devices  Type of Devices: Chair alarm in place, Call light within reach, Nurse notified, Left in chair     Restrictions  Position Activity Restriction  Other position/activity restrictions: WBAT     Subjective   General  Chart Reviewed: Yes  Additional Pertinent Hx: Patient presents to ED post fall d/t loss of balance in her home. No acute findings on CT head and neck. Xray reveals intertrochanteric fracture of L femur and chronic interstitial lung disease. CT of chest/abdomen reveals pleural effusion bilaterally, stable compression fractures of thoracic spine, and hiatal hernia. PMH: OA, hyperlipidemia, breast cancer, hepatitis  Diagnosis: other fracture of left femur, initial encounter for closed fracture  Subjective  Subjective: Patient supine at arrival. Agreeable to therapy with max encouragement and education. \"Do not push or pull, the pain in excruciating. \"         Social/Functional History  Social/Functional History  Lives With: Alone  Type of Home: Condo  Home Layout: One level  Home Access: Stairs to enter with rails  Entrance Stairs - Number of Steps: 5  Entrance Stairs - Rails: Right  Bathroom Shower/Tub: Walk-in shower  Bathroom Toilet: Standard  Bathroom Equipment: Grab bars in shower, Grab bars around toilet  Home Equipment: None  Has the patient had two or more falls in the past year or any fall with injury in the past year?: Yes (says her balance is poor)  Receives Help From: Family  ADL Assistance: Independent  Homemaking Assistance: Independent  Ambulation Assistance: Independent  Transfer Assistance: Independent  Active : Yes  Mode of Transportation: Car  Vision/Hearing  Vision  Vision: Within Functional Limits  Hearing  Hearing: Within functional limits    Cognition   Orientation  Orientation Level: Oriented to person;Oriented to place     Objective      Gross Assessment  AROM: Generally decreased, functional  Strength: Generally decreased, functional     Bed mobility  Supine to Sit: Dependent/Total;Maximum assistance;2 Person assistance  Scooting: Maximal assistance  Bed Mobility Comments: Patient very anxious to mobilize; fearful with any touch, initiation of movement; needed a lot of encouragement  Transfers  Sit to Stand: Maximum Assistance;2 Person Assistance (1xSTS with RW, 1x with merry stedy)  Stand to Sit: Maximum Assistance;2 Person Assistance (1x RW, 1x merry stedy)  Bed to Chair: Dependent/Total;2 Person Assistance (merry stedy)        Balance  Sitting - Static: Good  Standing - Static: Poor    AM-PAC Score  AM-PAC Inpatient Mobility Raw Score : 9 (01/19/23 1203)  AM-PAC Inpatient T-Scale Score : 30.55 (01/19/23 1203)  Mobility Inpatient CMS 0-100% Score: 81.38 (01/19/23 1203)  Mobility Inpatient CMS G-Code Modifier : CM (01/19/23 1203)    Goals  Short Term Goals  Time Frame for Short Term Goals: discharge  Short Term Goal 1: supine to sit modA x1 - ongoing  Short Term Goal 2: sit to stand with TW modAx1 - ongoing  Short Term Goal 3: bed to chair transfer with RW modA x1 - ongoing  Short Term Goal 4: ambulate 15ft with RW mod A x1 - ongoing  Patient Goals   Patient Goals : not stated       Education  Patient Education  Education Given To: Patient  Education Provided: Role of Therapy  Education Method: Verbal  Barriers to Learning: None  Education Outcome: Continued education needed      Therapy Time   Individual Concurrent Group Co-treatment   Time In 1119         Time Out 1202         Minutes 43          Timed Code Treatment Minutes: 43    Total Treatment Minutes:  [address] SPT     Therapist was present, directed the patient's care, made skilled judgement, and was responsible for assessment and treatment of the patient.   Cortney Wright, [address] [place] [address]

## 2023-01-19 NOTE — PROGRESS NOTES
Patient continues on IV ATB with no adverse side effects. Adequate amount of urine draining from marrero. Patient refusing to be turned due to pain, denies pain meds. In bed with eyes closed and call light in reach.

## 2023-01-20 LAB
A/G RATIO: 0.9 (ref 1.1–2.2)
ALBUMIN SERPL-MCNC: 2.7 G/DL (ref 3.4–5)
ALP BLD-CCNC: 68 U/L (ref 40–129)
ALT SERPL-CCNC: 11 U/L (ref 10–40)
ANION GAP SERPL CALCULATED.3IONS-SCNC: 9 MMOL/L (ref 3–16)
AST SERPL-CCNC: 20 U/L (ref 15–37)
ATYPICAL LYMPHOCYTE RELATIVE PERCENT: 5 % (ref 0–6)
BANDED NEUTROPHILS RELATIVE PERCENT: 5 % (ref 0–7)
BASOPHILS ABSOLUTE: 0 K/UL (ref 0–0.2)
BASOPHILS ABSOLUTE: 0 K/UL (ref 0–0.2)
BASOPHILS RELATIVE PERCENT: 0 %
BASOPHILS RELATIVE PERCENT: 1 %
BILIRUB SERPL-MCNC: 0.9 MG/DL (ref 0–1)
BUN BLDV-MCNC: 16 MG/DL (ref 7–20)
CALCIUM SERPL-MCNC: 9.1 MG/DL (ref 8.3–10.6)
CHLORIDE BLD-SCNC: 99 MMOL/L (ref 99–110)
CO2: 26 MMOL/L (ref 21–32)
CREAT SERPL-MCNC: 0.7 MG/DL (ref 0.6–1.2)
EOSINOPHILS ABSOLUTE: 0 K/UL (ref 0–0.6)
EOSINOPHILS ABSOLUTE: 0 K/UL (ref 0–0.6)
EOSINOPHILS RELATIVE PERCENT: 0 %
EOSINOPHILS RELATIVE PERCENT: 0 %
GFR SERPL CREATININE-BSD FRML MDRD: >60 ML/MIN/{1.73_M2}
GLUCOSE BLD-MCNC: 111 MG/DL (ref 70–99)
HCT VFR BLD CALC: 20.6 % (ref 36–48)
HCT VFR BLD CALC: 28 % (ref 36–48)
HCT VFR BLD CALC: 28.1 % (ref 36–48)
HEMOGLOBIN: 7.2 G/DL (ref 12–16)
HEMOGLOBIN: 9.7 G/DL (ref 12–16)
HEMOGLOBIN: 9.7 G/DL (ref 12–16)
LYMPHOCYTES ABSOLUTE: 0.6 K/UL (ref 1–5.1)
LYMPHOCYTES ABSOLUTE: 0.7 K/UL (ref 1–5.1)
LYMPHOCYTES RELATIVE PERCENT: 16 %
LYMPHOCYTES RELATIVE PERCENT: 3 %
MACROCYTES: ABNORMAL
MAGNESIUM: 1.7 MG/DL (ref 1.8–2.4)
MCH RBC QN AUTO: 31.8 PG (ref 26–34)
MCH RBC QN AUTO: 31.9 PG (ref 26–34)
MCHC RBC AUTO-ENTMCNC: 34.6 G/DL (ref 31–36)
MCHC RBC AUTO-ENTMCNC: 34.8 G/DL (ref 31–36)
MCV RBC AUTO: 91.5 FL (ref 80–100)
MCV RBC AUTO: 92.2 FL (ref 80–100)
METAMYELOCYTES RELATIVE PERCENT: 1 %
MICROCYTES: ABNORMAL
MONOCYTES ABSOLUTE: 0.6 K/UL (ref 0–1.3)
MONOCYTES ABSOLUTE: 0.6 K/UL (ref 0–1.3)
MONOCYTES RELATIVE PERCENT: 13 %
MONOCYTES RELATIVE PERCENT: 8 %
MYELOCYTE PERCENT: 1 %
NEUTROPHILS ABSOLUTE: 3 K/UL (ref 1.7–7.7)
NEUTROPHILS ABSOLUTE: 6.5 K/UL (ref 1.7–7.7)
NEUTROPHILS RELATIVE PERCENT: 68 %
NEUTROPHILS RELATIVE PERCENT: 79 %
PDW BLD-RTO: 14.5 % (ref 12.4–15.4)
PDW BLD-RTO: 14.6 % (ref 12.4–15.4)
PHOSPHORUS: 3.4 MG/DL (ref 2.5–4.9)
PLATELET # BLD: 172 K/UL (ref 135–450)
PLATELET # BLD: 246 K/UL (ref 135–450)
PMV BLD AUTO: 7.9 FL (ref 5–10.5)
PMV BLD AUTO: 8.2 FL (ref 5–10.5)
POLYCHROMASIA: ABNORMAL
POTASSIUM REFLEX MAGNESIUM: 4.4 MMOL/L (ref 3.5–5.1)
RBC # BLD: 2.25 M/UL (ref 4–5.2)
RBC # BLD: 3.05 M/UL (ref 4–5.2)
SODIUM BLD-SCNC: 134 MMOL/L (ref 136–145)
STOMATOCYTES: ABNORMAL
TOTAL PROTEIN: 5.7 G/DL (ref 6.4–8.2)
WBC # BLD: 4.3 K/UL (ref 4–11)
WBC # BLD: 7.7 K/UL (ref 4–11)

## 2023-01-20 PROCEDURE — 2580000003 HC RX 258: Performed by: INTERNAL MEDICINE

## 2023-01-20 PROCEDURE — 97110 THERAPEUTIC EXERCISES: CPT

## 2023-01-20 PROCEDURE — 6370000000 HC RX 637 (ALT 250 FOR IP): Performed by: INTERNAL MEDICINE

## 2023-01-20 PROCEDURE — 6370000000 HC RX 637 (ALT 250 FOR IP): Performed by: STUDENT IN AN ORGANIZED HEALTH CARE EDUCATION/TRAINING PROGRAM

## 2023-01-20 PROCEDURE — 97535 SELF CARE MNGMENT TRAINING: CPT

## 2023-01-20 PROCEDURE — 84100 ASSAY OF PHOSPHORUS: CPT

## 2023-01-20 PROCEDURE — 97530 THERAPEUTIC ACTIVITIES: CPT

## 2023-01-20 PROCEDURE — 80053 COMPREHEN METABOLIC PANEL: CPT

## 2023-01-20 PROCEDURE — 2580000003 HC RX 258: Performed by: STUDENT IN AN ORGANIZED HEALTH CARE EDUCATION/TRAINING PROGRAM

## 2023-01-20 PROCEDURE — 85014 HEMATOCRIT: CPT

## 2023-01-20 PROCEDURE — 83735 ASSAY OF MAGNESIUM: CPT

## 2023-01-20 PROCEDURE — 36415 COLL VENOUS BLD VENIPUNCTURE: CPT

## 2023-01-20 PROCEDURE — 6360000002 HC RX W HCPCS: Performed by: INTERNAL MEDICINE

## 2023-01-20 PROCEDURE — 1200000000 HC SEMI PRIVATE

## 2023-01-20 PROCEDURE — 85025 COMPLETE CBC W/AUTO DIFF WBC: CPT

## 2023-01-20 PROCEDURE — 85018 HEMOGLOBIN: CPT

## 2023-01-20 RX ADMIN — CLONAZEPAM 1 MG: 1 TABLET ORAL at 21:13

## 2023-01-20 RX ADMIN — CEFEPIME HYDROCHLORIDE 2000 MG: 2 INJECTION, POWDER, FOR SOLUTION INTRAMUSCULAR; INTRAVENOUS at 00:02

## 2023-01-20 RX ADMIN — SODIUM CHLORIDE, PRESERVATIVE FREE 10 ML: 5 INJECTION INTRAVENOUS at 21:49

## 2023-01-20 RX ADMIN — HEPARIN SODIUM 5000 UNITS: 5000 INJECTION INTRAVENOUS; SUBCUTANEOUS at 13:26

## 2023-01-20 RX ADMIN — SODIUM CHLORIDE, PRESERVATIVE FREE 10 ML: 5 INJECTION INTRAVENOUS at 10:11

## 2023-01-20 RX ADMIN — OLANZAPINE 5 MG: 5 TABLET, FILM COATED ORAL at 21:13

## 2023-01-20 RX ADMIN — ASPIRIN 81 MG: 81 TABLET, COATED ORAL at 10:11

## 2023-01-20 RX ADMIN — PANTOPRAZOLE SODIUM 40 MG: 40 TABLET, DELAYED RELEASE ORAL at 06:11

## 2023-01-20 RX ADMIN — CEFEPIME HYDROCHLORIDE 2000 MG: 2 INJECTION, POWDER, FOR SOLUTION INTRAMUSCULAR; INTRAVENOUS at 12:11

## 2023-01-20 RX ADMIN — GABAPENTIN 100 MG: 100 CAPSULE ORAL at 13:25

## 2023-01-20 RX ADMIN — VANCOMYCIN HYDROCHLORIDE 1000 MG: 10 INJECTION, POWDER, LYOPHILIZED, FOR SOLUTION INTRAVENOUS at 16:58

## 2023-01-20 RX ADMIN — ATORVASTATIN CALCIUM 10 MG: 10 TABLET, FILM COATED ORAL at 21:13

## 2023-01-20 RX ADMIN — HEPARIN SODIUM 5000 UNITS: 5000 INJECTION INTRAVENOUS; SUBCUTANEOUS at 06:11

## 2023-01-20 RX ADMIN — GABAPENTIN 100 MG: 100 CAPSULE ORAL at 21:13

## 2023-01-20 RX ADMIN — LEVOTHYROXINE SODIUM 112 MCG: 0.11 TABLET ORAL at 06:11

## 2023-01-20 RX ADMIN — SODIUM CHLORIDE, PRESERVATIVE FREE 10 ML: 5 INJECTION INTRAVENOUS at 21:50

## 2023-01-20 RX ADMIN — HEPARIN SODIUM 5000 UNITS: 5000 INJECTION INTRAVENOUS; SUBCUTANEOUS at 21:49

## 2023-01-20 RX ADMIN — OXYCODONE HYDROCHLORIDE 10 MG: 5 TABLET ORAL at 22:50

## 2023-01-20 RX ADMIN — AMIODARONE HYDROCHLORIDE 200 MG: 200 TABLET ORAL at 10:09

## 2023-01-20 RX ADMIN — GABAPENTIN 100 MG: 100 CAPSULE ORAL at 10:11

## 2023-01-20 ASSESSMENT — PAIN DESCRIPTION - DESCRIPTORS
DESCRIPTORS: ACHING
DESCRIPTORS: DISCOMFORT
DESCRIPTORS: ACHING

## 2023-01-20 ASSESSMENT — PAIN - FUNCTIONAL ASSESSMENT
PAIN_FUNCTIONAL_ASSESSMENT: PREVENTS OR INTERFERES WITH MANY ACTIVE NOT PASSIVE ACTIVITIES
PAIN_FUNCTIONAL_ASSESSMENT: PREVENTS OR INTERFERES SOME ACTIVE ACTIVITIES AND ADLS

## 2023-01-20 ASSESSMENT — PAIN DESCRIPTION - ONSET
ONSET: ON-GOING
ONSET: ON-GOING

## 2023-01-20 ASSESSMENT — PAIN DESCRIPTION - ORIENTATION
ORIENTATION: LEFT

## 2023-01-20 ASSESSMENT — PAIN DESCRIPTION - LOCATION
LOCATION: HIP
LOCATION: HIP;LEG
LOCATION: HIP;LEG

## 2023-01-20 ASSESSMENT — PAIN SCALES - GENERAL
PAINLEVEL_OUTOF10: 10
PAINLEVEL_OUTOF10: 8
PAINLEVEL_OUTOF10: 6

## 2023-01-20 ASSESSMENT — PAIN DESCRIPTION - PAIN TYPE
TYPE: ACUTE PAIN;SURGICAL PAIN
TYPE: ACUTE PAIN;SURGICAL PAIN

## 2023-01-20 ASSESSMENT — PAIN DESCRIPTION - FREQUENCY
FREQUENCY: CONTINUOUS
FREQUENCY: CONTINUOUS

## 2023-01-20 ASSESSMENT — PAIN DESCRIPTION - DIRECTION
RADIATING_TOWARDS: LLE
RADIATING_TOWARDS: LLE

## 2023-01-20 NOTE — CARE COORDINATION
ADDENDUM:    TREV met w/Pt and Pt's son and provided updates. Electronically signed by RAHEEM Vieyra LSW on 1/20/2023 at 5:50 PM          SW spoke to [facility], if pt is positive and requires MRSA isolation they would not be able to accommodate pt. They are following along. Pt's family have not provided SW any additional SNF choices at this time other than  [facility], [facility], or [facility]. Other snf referrals are pending. SW spoke to [facility] they dont have any beds. TREV LVM for [place] admissions.     Electronically signed by RAHEEM Vieyra, GISSELLE on 1/20/2023 at 2:07 PM  [phone]

## 2023-01-20 NOTE — PROGRESS NOTES
Comprehensive Nutrition Assessment    RECOMMENDATIONS:  PO Diet: Continue low fat/low chol/high fiber/ 2 g Na diet  ONS: continue ensure enlive TID  Nutrition Education: Education not indicated     NUTRITION ASSESSMENT:   Nutritional summary & status: Length of stay. Pt unavailable x2. Pt with no reported loss of appetite or wt loss per nutrition screen. Pt's weight has trended upwards since admission. Pt's most recent intakes recorded at % after previously being at 1-25% and pt is also drinking ONS. Will continue to monitor. No nutrition intervention needed at this time. Admission/PMH: Hx of bilateral breast cancer, s/p left mastectomy 1975, right lumpectomy and radiation in 1999, DVT 2009 while on [place], hypothyroidism, esophageal spasm, schatzki's ring, EGD with dilation 1/2011, GERD, hepatitis B, OA, spinal stenosis, Rt Hip Fracture s/p surgery 2/2010, Thoracic and Lumbar compression fractures. Presents for evaluation after fall. MALNUTRITION ASSESSMENT  Context of Malnutrition: Acute Illness   Malnutrition Status: No malnutrition    NUTRITION DIAGNOSIS   No nutrition diagnosis at this time     Nutrition Monitoring and Evaluation:   Food/Nutrient Intake Outcomes:  Food and Nutrient Intake, Supplement Intake  Physical Signs/Symptoms Outcomes:  Biochemical Data, Nutrition Focused Physical Findings, Weight     OBJECTIVE DATA: Significant to nutrition assessment  Nutrition Related Findings: +1 and +2 pitting edema, 1.7 Mg  Wounds: None  Nutrition Goals: Meet at least 75% of estimated needs, by next RD assessment     CURRENT NUTRITION THERAPIES  ADULT ORAL NUTRITION SUPPLEMENT; Breakfast, Lunch, Dinner; Standard High Calorie/High Protein Oral Supplement  ADULT DIET;  Regular; Low Fat/Low Chol/High Fiber/2 gm Na  PO Intake: 51-75%   PO Supplement Intake:%  Additional Sources of Calories/IVF:      ANTHROPOMETRICS  Current Height: 5' 1\" (154.9 cm)  Current Weight: 159 lb 9.8 oz (72.4 kg)    Admission weight: 147 lb 0.8 oz (66.7 kg)  Ideal Body Weight (IBW): 105 lbs  (48 kg)    Usual Bodyweight     BMI: 30.2    COMPARATIVE STANDARDS  Energy (kcal):  1448 - 1810 (20-25 kcal/kg)     Protein (g):  62 - 95 (1.3 - 2 g/kg IBW)       Fluid (mL/day):  1448 - 1810 (1ml/kcal) or per provider    The patient will be monitored per nutrition standards of care. Consult dietitian if additional nutrition interventions are needed prior to RD reassessment.      Yazmin Bolton, [address] [facility]:  [phone]  Office:  [phone]

## 2023-01-20 NOTE — PROGRESS NOTES
Patient has refused being turned this shift stating \"please don't hurt me\". Pain meds offered and declined.

## 2023-01-20 NOTE — PROGRESS NOTES
Physical Therapy  Facility/Department: [address] [place] [address]  Physical Therapy Daily Treatment    Name: Ted Garcia  : 1930  MRN: [id number]  Date of Service: 2023    Discharge Recommendations: Ted Garcia scored a 10/ on the AM-PAC short mobility form. Current research shows that an AM-PAC score of 17 or less is typically not associated with a discharge to the patient's home setting. Based on the patient's AM-PAC score and their current functional mobility deficits, it is recommended that the patient have 3-5 sessions per week of Physical Therapy at d/c to increase the patient's independence. Please see assessment section for further patient specific details. If patient discharges prior to next session this note will serve as a discharge summary. Please see below for the latest assessment towards goals. Patient Diagnosis(es): The encounter diagnosis was Closed fracture of left hip, initial encounter ([facility] [address] Past Medical History:  has a past medical history of Cancer ([facility] [address] Hepatitis, Hyperlipidemia, and Osteoarthritis. Past Surgical History:  has a past surgical history that includes Mastectomy; Breast lumpectomy;  section; and hip surgery (Left, 2023). Assessment   Assessment: Patient demonstrates increased participation in therapy this date. Continues to require max encouragement, education, and distraction. Anxiety and fear of moving impact patient's level of activity. She was able to assist with bed mobility and transfer, putting weight through her LLE. Continues requiring maxA to transfer and has strength and mobility deficits impacting her ability to tolerate 5-7 sessions of therapy/week. She has not yet met any goals. Recommend patient receive inpatient PT following d/c to increase strength and functional independence in order to return to her PLOF safely. Will continue to follow-up to progress mobility.   Treatment Diagnosis: decreased functional mobility  Requires PT Follow-Up: Yes  Activity Tolerance  Activity Tolerance Comments: Pain/anxiety are patient's barrier. She demos increased tolerance to activity this date, showing improved participation and response to transfers and ROM assessment. SHe is on 3L O2 and tolerates well but needs cueing for breathing during activity. Plan   Physcial Therapy Plan  General Plan:  (2-5)  Current Treatment Recommendations: Transfer training, Functional mobility training, Gait training, Strengthening  Safety Devices  Type of Devices: Call light within reach, Chair alarm in place, Nurse notified, Left in chair (Simultaneous filing. User may not have seen previous data.)     Restrictions  Position Activity Restriction  Other position/activity restrictions: WBAT     Subjective   General  Chart Reviewed: Yes  Patient assessed for rehabilitation services?: Yes  Additional Pertinent Hx: Patient presents to ED post fall d/t loss of balance in her home. No acute findings on CT head and neck. Xray reveals intertrochanteric fracture of L femur and chronic interstitial lung disease. CT of chest/abdomen reveals pleural effusion bilaterally, stable compression fractures of thoracic spine, and hiatal hernia. PMH: OA, hyperlipidemia, breast cancer, hepatitis  Diagnosis: other fracture of left femur, initial encounter for closed fracture  Subjective  Subjective: Patient supine at arrival and agreeable to therapy with encouragement. Says Stuart Standing you here to hurt me? \"         Social/Functional History  Social/Functional History  Lives With: Alone  Type of Home: Condo  Home Layout: One level  Home Access: Stairs to enter with rails  Entrance Stairs - Number of Steps: 5  Entrance Stairs - Rails: Right  Bathroom Shower/Tub: Walk-in shower  Bathroom Toilet: Standard  Bathroom Equipment: Grab bars in shower, Grab bars around toilet  Home Equipment: None  Has the patient had two or more falls in the past year or any fall with injury in the past year?: Yes (says her balance is poor)  Receives Help From: Family  ADL Assistance: Independent  Homemaking Assistance: Independent  Ambulation Assistance: Independent  Transfer Assistance: Independent  Active : Yes  Mode of Transportation: Car  Vision/Hearing  Vision  Vision: Within Functional Limits  Hearing  Hearing: Within functional limits    Cognition   Orientation  Overall Orientation Status: Within Functional Limits  Orientation Level: Oriented to person;Oriented to situation     Objective      Gross Assessment  AROM:  (L AAROM: knee flexion 70 deg (limited by tolerance to hip flexion); hip flexion ~40deg with pain)  Strength: Generally decreased, functional      Bed mobility  Supine to Sit: Moderate assistance;2 Person assistance;Maximum assistance (modAx2 vs maxAx1; patient participates and assists with advancing BLEs off bed, continues to need assist decreased surface tension on mattress and flexing trunk to sit)  Scooting: Maximal assistance;Dependent/Total;2 Person assistance  Bed Mobility Comments: Patient anxious, showed increased participation this date but continues to require max encouragement  Transfers  Sit to Stand: Moderate Assistance;2 Person Assistance (to RW)  Stand to Sit: Maximum Assistance;2 Person Assistance  Bed to Chair: Maximum assistance;2 Person Assistance (needed maxA to advance LLE; does not pick LLE off floor)  Ambulation  Device: Rolling Walker  Assistance: Maximum assistance;2 Person assistance  Distance: took a step from bed to chair     Balance  Sitting - Static: Good  Standing - Static: Poor  A/AROM Exercises: L AAROM: knee flexion, hip flexion, DF/PF. Patient shows increase in hip flexion with assistance. 5x B    AM-PAC Score  AM-PAC Inpatient Mobility Raw Score : 10 (01/20/23 1455)  AM-PAC Inpatient T-Scale Score : 32.29 (01/20/23 1455)  Mobility Inpatient CMS 0-100% Score: 76.75 (01/20/23 1455)  Mobility Inpatient CMS G-Code Modifier : CL (01/20/23  1455)    Goals  Short Term Goals  Time Frame for Short Term Goals: discharge  Short Term Goal 1: supine to sit modA x1 - ongoing  Short Term Goal 2: sit to stand with TW modAx1 - ongoing  Short Term Goal 3: bed to chair transfer with RW modA x1 - ongoing  Short Term Goal 4: ambulate 15ft with RW mod A x1 - ongoing  Patient Goals   Patient Goals : not stated       Education  Patient Education  Education Given To: Patient  Education Provided: Role of Therapy  Education Method: Verbal  Barriers to Learning: None  Education Outcome: Continued education needed      Therapy Time   Individual Concurrent Group Co-treatment   Time In 1402         Time Out 1440         Minutes 38          Timed Code Treatment Minutes:  38    Total Treatment Minutes:  [address] SPT   Therapist was present, directed the patient's care, made skilled judgement, and was responsible for assessment and treatment of the patient.   Alyssa Thacker, [address] [place] [address]

## 2023-01-20 NOTE — PROGRESS NOTES
Patient continues on IV ATB and LR. O2 remains in place. In bed with eyes closed and call light in reach.

## 2023-01-20 NOTE — PLAN OF CARE
Problem: Musculoskeletal - Adult  Goal: Return mobility to safest level of function  Outcome: Progressing  Flowsheets (Taken 1/20/2023 1010)  Return Mobility to Safest Level of Function: Assess patient stability and activity tolerance for standing, transferring and ambulating with or without assistive devices  Note: PT/OT consulted today, was able to move from the bed to the chair. Tolerated perfectly. Problem: Musculoskeletal - Adult  Goal: Maintain proper alignment of affected body part  Outcome: Progressing  Flowsheets (Taken 1/20/2023 1010)  Maintain proper alignment of affected body part: Support and protect limb and body alignment per provider's orders     Problem: Safety - Adult  Goal: Free from fall injury  1/20/2023 1534 by Rosemarie Lovelace RN  Outcome: Progressing  Note: Safety measures taken, bed in the lowest position, bed and chair alarm on, call light and belongings within reach. Problem: Pain  Goal: Verbalizes/displays adequate comfort level or baseline comfort level  Outcome: Progressing     Problem: Skin/Tissue Integrity  Goal: Absence of new skin breakdown  Description: 1. Monitor for areas of redness and/or skin breakdown  2. Assess vascular access sites hourly  3. Every 4-6 hours minimum:  Change oxygen saturation probe site  4. Every 4-6 hours:  If on nasal continuous positive airway pressure, respiratory therapy assess nares and determine need for appliance change or resting period.   1/20/2023 1534 by Rosemarie Lovelace RN  Outcome: Progressing

## 2023-01-20 NOTE — PROGRESS NOTES
Clinical Pharmacy Progress Note    Vancomycin - Management by Pharmacy    Consult Date(s): 1/17/23  Consulting Provider(s): Dr. Skylar Espinosa / Plan  1)  Post-op fevers - Vancomycin  Concurrent Antimicrobials: Cefepime  Day of Vanc Therapy:  4  Current Dosing Method: Bayesian-Guided AUC Dosing  Therapeutic Goal: -600 mg/L*hr  Current Dose / Plan:   Currently on 1000mg q24h - dose reduced yesterday based on level. Regimen predicts an AUC = 456 with trough = 14. Renal function stable. Continue same regimen. Repeat level may not be needed if pt is clinically improving, renal function remains stable, and short course of therapy is planned. Will monitor and order level if it becomes appropriate. Will continue to monitor clinical condition and make adjustments to regimen as appropriate. Please call with questions--  Thanks--  Nidia Grant, PharmD, BCPS, BCGP  [id number] ([facility])   1/20/2023 10:42 AM      Interval update:  Now afebrile. Working on SNF placement. Subjective/Objective:   Margarita Machuca is a 80 y.o. female with a PMHx significant for HLD, OA, DVT, hypothyroidism, spinal stenosis, and b/l breast cancer who is admitted with left femur fracture. Pt is s/p left hip intertrochoanteric nailing (done 1/12/23). Post-op course has included treatment for encephalopathy / post-op delirium, new onset A.fib with RVR, and urinary retention. Broad spectrum antibiotics were started 1/17 for post-op fevers.     Pharmacy is consulted to dose Vancomycin    Ht Readings from Last 1 Encounters:   01/12/23 5' 1\" (1.549 m)     Wt Readings from Last 1 Encounters:   01/20/23 159 lb 9.8 oz (72.4 kg)     Current & Prior Antimicrobial Regimen(s):   (1/12-1/13)  Cefepime 2000mg EI q12h (1/17-current)  Vancomycin - Pharmacy to dose  1250mg IV q24h (1/17-1/18)  1000mg IV q24h (1/18-current)    Vancomycin Level(s) / Doses:    Date Time Dose Type of Level / Level Interpretation   1/19 05:40 1250 mg IV q24h Random = 13.5mcg/mL Drawn ~13.5h after 2nd dose  Calculated AUC = 564 with trough = 17.4  Decrease to 1000mg q24h          Note: Serum levels collected for AUC-based dosing may be high if collected in close proximity to the dose administered. This is not necessarily indicative of toxicity. Cultures & Sensitivities:    Date Site Micro Susceptibility / Result   1/17 Blood x2 No growth to date    1/19 MRSA screening cx sent      Recent Labs     01/18/23  0905 01/19/23  0540 01/20/23  0500 01/20/23  0841   CREATININE 0.7 0.8  --  0.7   BUN 18 18  --  16   WBC 7.4 8.1 4.3  --          Estimated Creatinine Clearance: 47 mL/min (based on SCr of 0.7 mg/dL).     Additional Lab Values / Findings of Note:    Recent Labs     01/17/23  1110   PROCAL 0.41*

## 2023-01-20 NOTE — PROGRESS NOTES
Occupational Therapy  Facility/Department: [address]  Occupational Therapy Treatment Note     Name: Joyice Libman  : 1930  MRN: [id number]  Date of Service: 2023    Discharge Recommendations:Jen Costa scored a  on the AM-PAC ADL Inpatient form. Current research shows that an AM-PAC score of 17 or less is typically not associated with a discharge to the patient's home setting. Based on the patient's AM-PAC score and their current ADL deficits, it is recommended that the patient have 3-5 sessions per week of Occupational Therapy at d/c to increase the patient's independence. Please see assessment section for further patient specific details. If patient discharges prior to next session this note will serve as a discharge summary. Please see below for the latest assessment towards goals. OT Equipment Recommendations  Equipment Needed: No  Other: defer to [facility] care facility       Patient Diagnosis(es): The encounter diagnosis was Closed fracture of left hip, initial encounter ([facility] [address] Past Medical History:  has a past medical history of Cancer ([facility] [address] Hepatitis, Hyperlipidemia, and Osteoarthritis. Past Surgical History:  has a past surgical history that includes Mastectomy; Breast lumpectomy;  section; and hip surgery (Left, 2023). Treatment Diagnosis: impaired ADLs/ functional transfers and mobility 2/2 L femur fx      Assessment   Performance deficits / Impairments: Decreased functional mobility ; Decreased ADL status; Decreased safe awareness;Decreased endurance  Assessment: Pt seen x 4 this week. S/p L hip nailing -8 days Post -op. No Goal met - continue. Pt limited by ongoing anxiety / fear with mobility / OOB attempts,  but demo increased participation this date. Pt continues to require Mod/ Max A x 2 for mobility. Max A / dependent with LE ADLs/ toileting. Pt needing increased time with all activity and frequent rest breaks .   Pt would benefit from ongoing inpt OT at d/c to maximize functional level. Pt / family plan for SNF at d/c. Treatment Diagnosis: impaired ADLs/ functional transfers and mobility 2/2 L femur fx  Prognosis: Fair  REQUIRES OT FOLLOW-UP: Yes  Activity Tolerance  Activity Tolerance: Patient limited by fatigue;Patient Tolerated treatment well  Activity Tolerance Comments: Pt continues with anxiety throughout session. Does well with ongoing emotional support. Pt remains on 3L o2. Plan   Occupational Therapy Plan  Times Per Week: 2-5x  Times Per Day: Once a day  Current Treatment Recommendations: Safety education & training, Self-Care / ADL, Patient/Caregiver education & training, Endurance training, Functional mobility training     Restrictions  Position Activity Restriction  Other position/activity restrictions: WBAT    Subjective   General  Chart Reviewed: Yes  Patient assessed for rehabilitation services?: Yes  Additional Pertinent Hx: Admit 1/12 with fall + L femur fx on films    ortho consult to OR 1/12 s/p LEFT HIP INTERTROCHOANTERIC NAILING                                            PMHX: bilateral breast cancer, s/p left mastectomy 1975, right lumpectomy and radiation in 1999, DVT,hypothyroidism, esophageal spasm, schatzki's ring, GERD, hepatitis B, OA, spinal stenosis, Rt Hip Fx 2/2010, Thoracic and Lumbar compression fractures  Family / Caregiver Present: No  Diagnosis: L Femur fx s/p LEFT HIP INTERTROCHOANTERIC NAILING  Subjective  Subjective: ' I can't do therapy- you guys hurt me too much\" pt found relaxing in bed reading magazines. Pt initially declines OOB/OT tx but agreeable with mod encouragement.      Social/Functional History  Social/Functional History  Lives With: Alone  Type of Home: Condo  Home Layout: One level  Home Access: Stairs to enter with rails  Entrance Stairs - Number of Steps: 5  Entrance Stairs - Rails: Right  Bathroom Shower/Tub: Walk-in shower  Bathroom Toilet: Standard  Bathroom Equipment: Grab bars in shower, Grab bars around toilet  Home Equipment: None  Has the patient had two or more falls in the past year or any fall with injury in the past year?: Yes (says her balance is poor)  Receives Help From: Family  ADL Assistance: Independent  Homemaking Assistance: Independent  Ambulation Assistance: Independent  Transfer Assistance: Independent  Active : Yes  Mode of Transportation: Car       Objective       Safety Devices  Type of Devices: Call light within reach; Chair alarm in place;Nurse notified; Left in chair (Simultaneous filing. User may not have seen previous data.)     Toilet Transfers  Toilet - Technique: Stand step  Equipment Used: Standard bedside commode  Toilet Transfer: Dependent/Total;Maximum assistance;2 Person assistance  Toilet Transfers Comments: simulated from bed to chair with walker / max v.cues     ADL  Feeding: Setup;Supervision  LE Dressing: Dependent/Total  LE Dressing Skilled Clinical Factors: with socks. Toileting: Dependent/Total  Toileting Skilled Clinical Factors: continues with marrero cath / incont of stool at times - new pad placed under patient     Activity Tolerance  Activity Tolerance Comments: Pain/anxiety are patient's barrier. She demos increased tolerance to activity this date, showing improved participation and response to transfers and ROM assessment. She is on 3L O2 and tolerates well but needs cueing for breathing during activity. Transfers  Stand Step Transfers: Dependent/Total;2 Person assistance;Maximum assistance (bed to chair  -- needing max cues)  Sit to stand: Maximum assistance;Dependent/Total;2 Person assistance  Stand to sit: 2 Person assistance; Moderate assistance;Dependent/Total  Transfer Comments: Standing x 2 attempts.   Vision  Vision: Within Functional Limits  Hearing  Hearing: Within functional limits  Orientation  Overall Orientation Status: Within Functional Limits  Orientation Level: Oriented to person;Oriented to situation Exercise Treatment: pt edu and encouraged to perform AROM all planes BUE's  freq during the day -- verb understanding  Education Given To: Patient  Education Provided: Role of Therapy;Plan of Care;IADL Safety;Precautions; ADL Adaptive Strategies;Transfer Training  Education Method: Demonstration;Verbal  Barriers to Learning: Other (Comment) (anxiety)  Education Outcome: Continued education needed      AM-PAC Score  AM-PAC Inpatient Daily Activity Raw Score: 11 (01/19/23 1207)  AM-PAC Inpatient ADL T-Scale Score : 29.04 (01/19/23 1207)  ADL Inpatient CMS 0-100% Score: 70.42 (01/19/23 1207)  ADL Inpatient CMS G-Code Modifier : CL (01/19/23 1207)    Goals  Short Term Goals  Time Frame for Short Term Goals: at d/c  Short Term Goal 1: Stance x 4 mins with CGA x 1 for ADLs/ IADLs- not met  Short Term Goal 2: Functional transfers with CGA-- not met  Short Term Goal 3: LE dressing with MIn A and AE prn - not met  Patient Goals   Patient goals : Be able to go home soon     Therapy Time   Individual Concurrent Group Co-treatment   Time In 1401         Time Out 1441         Minutes 40             Timed Code Treatment Minutes:  40 mins     Total Treatment Minutes:  40 mins       Di Oseguear, OT

## 2023-01-20 NOTE — PLAN OF CARE
Patient remains free from falls and injury. No new skin issues noted. Problem: Safety - Adult  Goal: Free from fall injury  1/20/2023 0337 by Emily Danielle RN  Outcome: Progressing     Problem: ABCDS Injury Assessment  Goal: Absence of physical injury  Outcome: Progressing     Problem: Skin/Tissue Integrity  Goal: Absence of new skin breakdown  Description: 1. Monitor for areas of redness and/or skin breakdown  2. Assess vascular access sites hourly  3. Every 4-6 hours minimum:  Change oxygen saturation probe site  4. Every 4-6 hours:  If on nasal continuous positive airway pressure, respiratory therapy assess nares and determine need for appliance change or resting period.   1/20/2023 4879 by Emily Danielle RN  Outcome: Progressing

## 2023-01-20 NOTE — PROGRESS NOTES
Hospitalist Progress Note      PCP: Candis Soler    Date of Admission: 1/12/2023    Chief Complaint: worsening lethargy      Subjective:     Patient seen and examined. She was working with therapy and was able to be adjusted to the edge of the bed with her feet on the floor. She was very hesitant with this motion but did participate. She was agitated by the thought of additional blood draws. We discussed her hemoglobin and the need to follow this and transfuse as necessary. She voiced understanding. This was discussed with her son earlier today who appreciated the information and agree with the transfusion as necessary.       Medications:  Reviewed    Infusion Medications    lactated ringers IV soln 60 mL/hr at 01/20/23 0456    sodium chloride 20 mL/hr (01/19/23 1250)     Scheduled Medications    vancomycin  1,000 mg IntraVENous Q24H    cefepime  2,000 mg IntraVENous Q12H    aspirin  81 mg Oral Daily    amiodarone  200 mg Oral Daily    [Held by provider] metoprolol succinate  12.5 mg Oral Daily    heparin (porcine)  5,000 Units SubCUTAneous 3 times per day    OLANZapine  5 mg Oral Nightly    lidocaine  2 patch TransDERmal Daily    gabapentin  100 mg Oral TID    sodium chloride flush  5-40 mL IntraVENous 2 times per day    levothyroxine  112 mcg Oral QAM AC    atorvastatin  10 mg Oral Nightly    pantoprazole  40 mg Oral QAM AC    midazolam  0.5 mg IntraVENous Once    sodium chloride flush  5-40 mL IntraVENous 2 times per day     PRN Meds: lidocaine, perflutren lipid microspheres, sodium chloride flush, ondansetron **OR** ondansetron, polyethylene glycol, acetaminophen **OR** acetaminophen, clonazePAM, oxyCODONE **OR** oxyCODONE, simethicone, sodium chloride flush, sodium chloride      Intake/Output Summary (Last 24 hours) at 1/20/2023 1646  Last data filed at 1/20/2023 1328  Gross per 24 hour   Intake 913.61 ml   Output 1500 ml   Net -586.39 ml         Physical Exam Performed:    BP (!) 107/58 Pulse 93   Temp 98 °F (36.7 °C) (Oral)   Resp 16   Ht 5' 1\" (1.549 m)   Wt 159 lb 9.8 oz (72.4 kg)   SpO2 96%   BMI 30.16 kg/m²     GEN more fatigued. HEENT normocephalic, anicteric sclera, EOMI, mucosa moist, no stridor  NECK supple, trachea midline  RESP on 2-3LPM, in no distress, clear to auscultation anteriorly, decreased at right base  CARDS RRR, S1, S2, systolic murmurs over RUSB, LUSB, left>right upper extremity edema, left thigh edema and bilateral leg edema, radial pulse 2+, DP pulse 2+  ABD +BS, soft nontender   marrero catheter in place  MSK left hip tenderness, edema, no cyanosis, no clubbing  SKIN warm, dry  NEURO some fatigue, oriented x 3, no facial asymmetry, no dysarthria, moving spontaneously  PSYCH normal mood      Labs:   Recent Labs     01/18/23  0905 01/19/23  0540 01/20/23  0500   WBC 7.4 8.1 4.3   HGB 8.4* 8.0* 7.2*   HCT 24.1* 23.6* 20.6*    176 172       Recent Labs     01/18/23  0905 01/19/23  0540 01/20/23  0841   * 137 134*   K 4.2 4.3 4.4    103 99   CO2 24 27 26   BUN 18 18 16   CREATININE 0.7 0.8 0.7   CALCIUM 8.7 8.9 9.1   PHOS 3.1 2.8 3.4       Recent Labs     01/18/23  0905 01/19/23  0540 01/20/23  0841   AST 16 19 20   ALT 11 13 11   BILITOT 1.1* 1.0 0.9   ALKPHOS 77 101 68       No results for input(s): INR in the last 72 hours. No results for input(s): Bernadette Mauricio in the last 72 hours. Urinalysis:      Lab Results   Component Value Date/Time    NITRU Negative 01/17/2023 01:22 PM    [address] None seen 01/17/2023 01:22 PM    BACTERIA 1+ 01/17/2023 01:22 PM    RBCUA None seen 01/17/2023 01:22 PM    BLOODU Negative 01/17/2023 01:22 PM    SPECGRAV 1.025 01/17/2023 01:22 PM    GLUCOSEU Negative 01/17/2023 01:22 PM       Radiology:  CT HEAD WO CONTRAST   Final Result      Chronic ischemic changes of the brain and atrophy with no midline shift or hydrocephalus or definitive hemorrhage.  Extensive artifact noted likely from patient motion XR TIBIA FIBULA LEFT (2 VIEWS)   Final Result      Normal radiographs of the left tibia and fibula. Knee compartment narrowing appreciated. Left ankle 3 view:      HISTORY: Trauma:      FINDINGS:      AP lateral oblique views demonstrate normal alignment. There is no acute fracture or dislocation      IMPRESSION:      No acute fracture       LEFT HIP 2 VIEW , AP view pelvis      HISTORY: Jada Mcleod with hip pain, previous left hip pinning      PROCEDURE: AP view the pelvis and lateral view of left hip were obtained      COMPARISON: Intraoperative studies from January 12, 2023      FINDINGS:      There is normal alignment of the right hip with 4 screws noted traversing the trochanter femoral neck and head from previous fracture fixation without significant displacement, unchanged. Left trochanteric hip fixation is intact with transfixation screws through the trochanter femoral neck and long intramedullary amaris remaining in stable position compared to recent study. No discrete acute fracture or dislocation. No expansile cystic or destructive change identified      AP view of the pelvis demonstrates no acute bony defect. Diffuse demineralization noted. Degenerative changes lower lumbar spine. The sacroiliac margins appear normal.    There is some minimal sclerosis along the acetabulum      IMPRESSION:      No acute deformity involving the right or left hip. Previous pinning of the right hip is unchanged. Previous fracture fixation of the left trochanteric and hip is intact      No acute bony defect in the pelvis. Diffuse demineralization            XR SHOULDER LEFT (MIN 2 VIEWS)   Final Result      Chronic advanced arthropathy of the left shoulder with loose bodies and subarticular erosions affecting the humeral head and advanced glenohumeral arthritic change and remodeling         XR ANKLE LEFT (2 VIEWS)   Final Result      Normal radiographs of the left tibia and fibula.  Knee compartment narrowing appreciated. Left ankle 3 view:      HISTORY: Trauma:      FINDINGS:      AP lateral oblique views demonstrate normal alignment. There is no acute fracture or dislocation      IMPRESSION:      No acute fracture       LEFT HIP 2 VIEW , AP view pelvis      HISTORY: Dossie Elmore with hip pain, previous left hip pinning      PROCEDURE: AP view the pelvis and lateral view of left hip were obtained      COMPARISON: Intraoperative studies from January 12, 2023      FINDINGS:      There is normal alignment of the right hip with 4 screws noted traversing the trochanter femoral neck and head from previous fracture fixation without significant displacement, unchanged. Left trochanteric hip fixation is intact with transfixation screws through the trochanter femoral neck and long intramedullary amaris remaining in stable position compared to recent study. No discrete acute fracture or dislocation. No expansile cystic or destructive change identified      AP view of the pelvis demonstrates no acute bony defect. Diffuse demineralization noted. Degenerative changes lower lumbar spine. The sacroiliac margins appear normal.    There is some minimal sclerosis along the acetabulum      IMPRESSION:      No acute deformity involving the right or left hip. Previous pinning of the right hip is unchanged. Previous fracture fixation of the left trochanteric and hip is intact      No acute bony defect in the pelvis. Diffuse demineralization            XR KNEE RIGHT (1-2 VIEWS)   Final Result      1. Right knee: Advanced arthritic change as described above. No sign of any fracture or acute deformity. 2. Left knee: Moderate degenerative change particularly involving the lateral compartment with some prepatellar soft tissue swelling. No acute defect      XR HIP 2-3 VW W PELVIS LEFT   Final Result      Normal radiographs of the left tibia and fibula. Knee compartment narrowing appreciated.       Left ankle 3 view: HISTORY: Trauma:      FINDINGS:      AP lateral oblique views demonstrate normal alignment. There is no acute fracture or dislocation      IMPRESSION:      No acute fracture       LEFT HIP 2 VIEW , AP view pelvis      HISTORY: Vanessa Escobar with hip pain, previous left hip pinning      PROCEDURE: AP view the pelvis and lateral view of left hip were obtained      COMPARISON: Intraoperative studies from January 12, 2023      FINDINGS:      There is normal alignment of the right hip with 4 screws noted traversing the trochanter femoral neck and head from previous fracture fixation without significant displacement, unchanged. Left trochanteric hip fixation is intact with transfixation screws through the trochanter femoral neck and long intramedullary amaris remaining in stable position compared to recent study. No discrete acute fracture or dislocation. No expansile cystic or destructive change identified      AP view of the pelvis demonstrates no acute bony defect. Diffuse demineralization noted. Degenerative changes lower lumbar spine. The sacroiliac margins appear normal.    There is some minimal sclerosis along the acetabulum      IMPRESSION:      No acute deformity involving the right or left hip. Previous pinning of the right hip is unchanged. Previous fracture fixation of the left trochanteric and hip is intact      No acute bony defect in the pelvis. Diffuse demineralization            XR KNEE LEFT (1-2 VIEWS)   Final Result      1. Right knee: Advanced arthritic change as described above. No sign of any fracture or acute deformity. 2. Left knee: Moderate degenerative change particularly involving the lateral compartment with some prepatellar soft tissue swelling. No acute defect      XR HIP LEFT (2-3 VIEWS)   Final Result      Intraoperative fluoroscopy for open reduction internal fixation of the left hip. Refer to the operative note for details.       FLUORO FOR SURGICAL PROCEDURES   Final Result      Intraoperative fluoroscopy for open reduction internal fixation of the left hip. Refer to the operative note for details. CT CHEST ABDOMEN PELVIS W CONTRAST Additional Contrast? None   Final Result      CHEST:   1.  Small right pleural effusion and trace left pleural effusion. Mild bibasilar atelectasis. 2.  Stable remote compression deformities at T12 and L1 compared to prior MRI from 2015. There is also new compression deformity at T11 compared to the study, but this is age indeterminant and may also be remote. Recommend correlation with point    tenderness in this level. 3.  Motion limited exam without definite evidence of acute traumatic intrathoracic abnormality. 4.  Moderate hiatal hernia. ABDOMEN AND PELVIS:   1.  Mildly displaced comminuted intratrochanteric fracture of the left femur. 2.  Surrounding stranding/hematoma in the left thigh as detailed above. 3.  No additional acute traumatic abnormality identified in the abdomen and pelvis per         CT CERVICAL SPINE WO CONTRAST   Final Result   1. No acute traumatic abnormality of the cervical spine. 2. Stable multilevel degenerative disc disease and facet arthropathy. 3. Stable slight rotatory subluxation of C1 on C2 on the right side. CT HEAD WO CONTRAST   Final Result      1. Stable exam with no acute intracranial abnormality. XR FEMUR LEFT (MIN 2 VIEWS)   Final Result   Addendum (preliminary) 1 of 1   [ADDENDUM #1   Addendum: Initial report by Dr. Cabrera Concepcion. Addendum by Dr. Cherelle Cruz. Frontal view of the pelvis and frontal and frog-leg lateral views of the    left femur were obtained. There is a nondisplaced intertrochanteric    fracture of the left femur. No dislocation. Orthopedic hardware of the    proximal right femur is noted. No    additional fracture deformity. Final   Nondisplaced intertrochanteric fracture on the left. IMPRESSION:      No pneumothorax.       Diffusely prominent interstitial markings presumably representing chronic interstitial lung disease. No prior for comparison. Normal cardiomediastinal silhouette. Presumed old fracture deformity of the proximal left humerus. If there is left shoulder pain then dedicated radiographs are indicated. XR PELVIS (1-2 VIEWS)   Final Result   Addendum (preliminary) 1 of 1   [ ADDENDUM #1 *   Addendum: Initial report by Dr. Ronni Martinez. Addendum by Dr. Blake Connors. Frontal view of the pelvis and frontal and frog-leg lateral views of the    left femur were obtained. There is a nondisplaced intertrochanteric    fracture of the left femur. No dislocation. Orthopedic hardware of the    proximal right femur is noted. No    additional fracture deformity. Final   Nondisplaced intertrochanteric fracture on the left. IMPRESSION:      No pneumothorax. Diffusely prominent interstitial markings presumably representing chronic interstitial lung disease. No prior for comparison. Normal cardiomediastinal silhouette. Presumed old fracture deformity of the proximal left humerus. If there is left shoulder pain then dedicated radiographs are indicated. XR CHEST PORTABLE   Final Result   Addendum (preliminary) 1 of 1   [ADDENDUM #1    Addendum: Initial report by Dr. Ronni Martinez. Addendum by Dr. Blake Connors. Frontal view of the pelvis and frontal and frog-leg lateral views of the    left femur were obtained. There is a nondisplaced intertrochanteric    fracture of the left femur. No dislocation. Orthopedic hardware of the    proximal right femur is noted. No    additional fracture deformity. Final   Nondisplaced intertrochanteric fracture on the left. IMPRESSION:      No pneumothorax. Diffusely prominent interstitial markings presumably representing chronic interstitial lung disease. No prior for comparison. Normal cardiomediastinal silhouette.       Presumed old fracture deformity of the proximal left humerus. If there is left shoulder pain then dedicated radiographs are indicated. Assessment/Plan: This is a 79 yo Female, with history of bilateral breast cancer, s/p left mastectomy 1975, right lumpectomy and radiation in 1999, DVT 2009 while on Ev[place], hypothyroidism, esophageal spasm, schatzki's ring, EGD with dilation 1/2011, GERD, hepatitis B, OA, spinal stenosis, Rt Hip Fracture s/p surgery 2/2010, Thoracic and Lumbar compression fractures, who presents for evaluation after fall. She was found to have sustained mildly displaced comminuted intratrochanteric fracture of the left femur. Patient is alert, oriented, and coherent. Daughter in-law, Beatris Naik, is present and assists with clinical history. This morning patient walked from her bedroom to the bathroom. When she reached the bathroom, she felt lightheaded. States that she must have turned around, lost her balance, and fell onto her left side. After falling she was in significant pain and was unable to rise on her own. Patient has a  who comes every morning to walk her dog at ~ 730 AM.    Knowing that her  was coming, patient screamed out for help. Reportedly she was found likely on her left side on the bathroom floor. Her  called EMS and patient was taken to [facility], [facility]. for evaluation. Patient has been in her usual state of health. She walks about 1 mile daily without assistance. She used to walk longer distances. Patient denies recent fevers, chills, cough, shortness of breath, chest pain, palpitations at rest and on exertion. She denies history of nausea, vomiting, abdominal pain, dysuria, diarrhea. She eats three meals daily. States that she does not eat much during lunchtime. Daughter in-law, Beatris Naik said patient had another fall in February 2022 while walking her dog; she was found on the side walk.   After, she has been working with PT/OT to work on her strength and stability. She may have fallen a couple of times after. Active Hospital Problems    Diagnosis Date Noted    Closed fracture of left hip ([facility] [address] [S72.002A] 01/16/2023     Priority: Medium    Other fracture of left femur, initial encounter for closed fracture [facility]) [S72.8X2A] 01/12/2023     Priority: Medium     PostOp Fever  Possible Pneumonia  - fever up to 102.3 F.  - new leukocytosis with WBC 13.3K with left shift on 1/17/23.  - Procalcitonin 0.41, from 0.19 on 1/13/23.  - confusion.  - UA 1/17 showed trace protein, no nitrite, no leuk est, WBC not seen. - blood cultures 1/17 negative to date. - CXR 1/17 showed bilateral pleural-parenchymal opacities suggesting moderate large pleural effusions and airspace disease, right greater than left. Pulmonary edema suspected. Pneumonia not excluded. No pneumothorax. - Started IV vancomycin, cefepime (1/17-). - MRSA nasal swab. D/c vancomycin of MRSA screen negative   1/20: Continue cefepime through 1/23. White blood count has normalized. Continue to monitor. Encephalopathy, Post-op Delirium, Fevers, Possible Pneumonia  - became confused on 1/14 and pulled out IV's. - had to place in soft restraints for safety. - in the setting of post-op and elderly age. Adequate hydration and pain control.  - placed in restraints on 1/14-1/15,  - mental clear and oriented x 3 on 1/15. She was understanding about being in restraints so that she would not inadvertently pull on her IV lines. - off restraints on 1/16. New Onset Atrial Fibrillation with RVR on 1//14  - RVR with HR to 160's and became hypotensive to SBP 70's in the setting of severe AS  - Received 1L NS bolus. - Received amiodarone bolus 150 mg (1/14), then started infusion of 1 mg/min for 6 hours, followed by 0.5 mg/min. -- she converted back NSR after amiodarone bolus and start of 1mg/min infusion. -- consider anticoagulation in the setting valvular disease, however, she is 81 yo.   The onset of atrial fibrillation, converted to NSR < 48 hours. - Monitor and discuss with family. - start ASA 81 mg daily. Still contemplating AC.  - Cardiology evaluated patient and started on amiodarone 200 mg daily. 1/20: I doubt this patient would be a candidate for anticoagulation with blood thinners due to her fall history. If any antiplatelet therapy was absolutely necessary, I would err on that side as opposed to anticoagulation. Perhaps a baby aspirin upon discharge. Postoperative anemia/acute  1/20: Hemoglobin has trended down fairly quickly from I half to 1 g daily. Hemoglobin 7.2 on today. Ordered a repeat hemoglobin this afternoon and I am awaiting results. Will transfuse if less than 7.0. This has been discussed with both the patient and her son. It does not seem to be any obvious source of bleeding. Seems to be clearly postoperative. No evidence of blood pooling around the wound. Monitor closely. Acute Hypoxic Respiratory Failure  Bilateral Pleural Effusion  ? Pulmonary Edema vs Pneumonia  - in the setting right pleural effusion and possible pneumonia. - CT Chest 1/12 showed small right pleural effusion, trace left pleural effusion. Mild basilar atelectasis. - CXR (portable) 1/17 showed moderate to large pleural effusions and airspace disease in right > left lungs, favoring pulmonary edema. Pneumonia not excluded. - diuresis as below in Edema section. On 3 L O2   May order CT chest if no improvement       Edema  - in the setting of immobility and severe AS, receiving low volume IVF's for borderline BP.  - start IV lasix (1/18-) with low rate IVF's to achieve diuresis in the setting of borderline BP and to protect her kidney.     Severe Aortic Stenosis, Symptomatic  Borderline BP  LV with Grade II Diastolic Dysfunction  Mild to Moderate Mitral Regurgitation  Mild to Moderate Mitral Stenosis  - ECHO 1/12/23 showed normal LV cavity size, wall thickness, LVEF 60-65%, no regional wall motion abnormalities. Grade II diastolic dysfunction. Mitral valve leaflets are thickened/calcified. MAC. Mild to moderate MR and MS. Severe aortic stenosis with peak velocity of 5.3 m/s, mean pressure gradient of 66 mmHg. Mild TR. PSAP 48 mmHg. - Reports lightheadedness on rising. When she fell on 1/12 -- when she walked to the bathroom she was not initially lightheaded; after reaching the bathroom she felt lightheaded. She denies shortness of breath, chest pain, palpitations on exertion. - cardiology consulted for consideration of TAVR. Patient extremely functional; walks 1 mile daily without assistive device. - Cardiology evaluated patient. Recommended outpatient evaluation for TAVR. Mildly Displaced Comminuted Intra-trochanteric Fracture of Left Femur  - CT A/P with contrast 1/12/23 showed mildly displaced comminuted intratrochanteric fracture of the left femur. Surrounding stranding/hematoma in the left thigh as detailed above. - s/p ORIF of left femoral fracture with CMN on 1/12/23  - monitor for post-op complications. - Pain control -- sensitive to narcotics. - Bowel regimen. Add lidocaine patch      Recurrent Falls  Lightheadedness  - walks 1 mile daily without assistive device. - Had a fall in February 2022 while walking her dog.  - she may have fallen a couple more times after, then today 1/12. -- prior to falling today -- she felt lightheaded, turned and lost her balance resulting in her fall. -- adm labs showed elevated lactic acid and ECHO showed severe aortic stenosis. - CT of Head wo contrast 1/12 showed no acute intracranial process. - CT Cervical Spine wo contrast 1/12 showed no acute traumatic abnormality of the cervical spine. Stable multilevel degenerative disc disease and facet arthropathy. Stable slight rotatory subluxation of C1 on C2 on the right side.  - B12 636, folate >20, vitamin D5OH 22.8 on 1/13/23.  - Started vitamin D3 2000 units daily.      Elevated Lactic Acid  - adm lactic acid 3.0.  - Administered 2 L LR, then started LR infusion 75 cc/hr. - lactic acid normalized after receiving IVF's. Small Right Pleural Effusion  Trace Left Pleural Effusion  - in the setting of valvular disease and LV with grade II diastolic dysfunction,    Urinary Retention  - bladder scan showed > 500 cc urine.    - marrero placed on 1/12. Rt Upper Extremity Acute Totally Occluding Superficial Venous Thrombosis of Cephalic Vein From Wrist to Mid Forearm in the setting of IV lines, patient also pulled out IV's. - on Venous US of UE on 1/17.  - warm compresses. - distal superficial thrombus. - supportive care. Hypothyroidism  - TSH 1.15, free T4 1.1 on 1/13/23.  - Continue home levothyroxine 112 mcg daily.  - TSH, free T4 (pharmacy verified that she has not filled her prescription in several months.)  - Recheck TFT in 1 month. Remote Compression Deformities at T12, L1  New Compression Deformity at T11  OA  - vitamin D25OH level 22.8 on 1/13/23.  - start vitamin D3 2000 units daily. History of Rt Hip Fracture   - s/p surgery 2/2010     History of bilateral breast cancer  - s/p left mastectomy 1975  - s/p right lumpectomy and radiation in 1999     DVT 2009   - thought possible related to Ev[employer]  - DVT ppx. Moderate Hiatal Hernia  GERD  History of Esophageal spasm  History of Schatzki's ring  - S/p EGD with dilation 1/2011  - Continue home omeprazole 40 mg daily. History of Hepatitis B       DVT Prophylaxis:   Diet: ADULT ORAL NUTRITION SUPPLEMENT; Breakfast, Lunch, Dinner; Standard High Calorie/High Protein Oral Supplement  ADULT DIET; Regular; Low Fat/Low Chol/High Fiber/2 gm Na  Code Status: Full Code    PT/OT Eval Status: PT/OT    Dispo - once medically stable.     Eddi Stanley MD

## 2023-01-21 LAB
A/G RATIO: 1.1 (ref 1.1–2.2)
ALBUMIN SERPL-MCNC: 2.9 G/DL (ref 3.4–5)
ALP BLD-CCNC: 62 U/L (ref 40–129)
ALT SERPL-CCNC: 13 U/L (ref 10–40)
ANION GAP SERPL CALCULATED.3IONS-SCNC: 8 MMOL/L (ref 3–16)
AST SERPL-CCNC: 22 U/L (ref 15–37)
BANDED NEUTROPHILS RELATIVE PERCENT: 2 % (ref 0–7)
BASOPHILS ABSOLUTE: 0 K/UL (ref 0–0.2)
BASOPHILS RELATIVE PERCENT: 0 %
BILIRUB SERPL-MCNC: 0.9 MG/DL (ref 0–1)
BLOOD CULTURE, ROUTINE: NORMAL
BUN BLDV-MCNC: 16 MG/DL (ref 7–20)
CALCIUM SERPL-MCNC: 9.3 MG/DL (ref 8.3–10.6)
CHLORIDE BLD-SCNC: 103 MMOL/L (ref 99–110)
CO2: 28 MMOL/L (ref 21–32)
CREAT SERPL-MCNC: 0.8 MG/DL (ref 0.6–1.2)
CULTURE, BLOOD 2: NORMAL
EOSINOPHILS ABSOLUTE: 0 K/UL (ref 0–0.6)
EOSINOPHILS RELATIVE PERCENT: 0 %
GFR SERPL CREATININE-BSD FRML MDRD: >60 ML/MIN/{1.73_M2}
GLUCOSE BLD-MCNC: 113 MG/DL (ref 70–99)
HCT VFR BLD CALC: 24.6 % (ref 36–48)
HEMOGLOBIN: 8.3 G/DL (ref 12–16)
LYMPHOCYTES ABSOLUTE: 0.3 K/UL (ref 1–5.1)
LYMPHOCYTES RELATIVE PERCENT: 7 %
MCH RBC QN AUTO: 31.8 PG (ref 26–34)
MCHC RBC AUTO-ENTMCNC: 33.7 G/DL (ref 31–36)
MCV RBC AUTO: 94.5 FL (ref 80–100)
METAMYELOCYTES RELATIVE PERCENT: 1 %
MONOCYTES ABSOLUTE: 0.4 K/UL (ref 0–1.3)
MONOCYTES RELATIVE PERCENT: 10 %
MRSA CULTURE ONLY: NORMAL
MYELOCYTE PERCENT: 1 %
NEUTROPHILS ABSOLUTE: 3.7 K/UL (ref 1.7–7.7)
NEUTROPHILS RELATIVE PERCENT: 79 %
PDW BLD-RTO: 14.5 % (ref 12.4–15.4)
PLATELET # BLD: 183 K/UL (ref 135–450)
PMV BLD AUTO: 7.5 FL (ref 5–10.5)
POTASSIUM SERPL-SCNC: 4.2 MMOL/L (ref 3.5–5.1)
RBC # BLD: 2.6 M/UL (ref 4–5.2)
SODIUM BLD-SCNC: 139 MMOL/L (ref 136–145)
TOTAL PROTEIN: 5.5 G/DL (ref 6.4–8.2)
WBC # BLD: 4.4 K/UL (ref 4–11)

## 2023-01-21 PROCEDURE — 6370000000 HC RX 637 (ALT 250 FOR IP): Performed by: INTERNAL MEDICINE

## 2023-01-21 PROCEDURE — 80053 COMPREHEN METABOLIC PANEL: CPT

## 2023-01-21 PROCEDURE — 36415 COLL VENOUS BLD VENIPUNCTURE: CPT

## 2023-01-21 PROCEDURE — 2580000003 HC RX 258: Performed by: INTERNAL MEDICINE

## 2023-01-21 PROCEDURE — 2580000003 HC RX 258: Performed by: STUDENT IN AN ORGANIZED HEALTH CARE EDUCATION/TRAINING PROGRAM

## 2023-01-21 PROCEDURE — 1200000000 HC SEMI PRIVATE

## 2023-01-21 PROCEDURE — 6360000002 HC RX W HCPCS: Performed by: INTERNAL MEDICINE

## 2023-01-21 PROCEDURE — 85025 COMPLETE CBC W/AUTO DIFF WBC: CPT

## 2023-01-21 PROCEDURE — 6370000000 HC RX 637 (ALT 250 FOR IP): Performed by: STUDENT IN AN ORGANIZED HEALTH CARE EDUCATION/TRAINING PROGRAM

## 2023-01-21 RX ADMIN — HEPARIN SODIUM 5000 UNITS: 5000 INJECTION INTRAVENOUS; SUBCUTANEOUS at 14:03

## 2023-01-21 RX ADMIN — GABAPENTIN 100 MG: 100 CAPSULE ORAL at 09:09

## 2023-01-21 RX ADMIN — SODIUM CHLORIDE: 9 INJECTION, SOLUTION INTRAVENOUS at 11:58

## 2023-01-21 RX ADMIN — HEPARIN SODIUM 5000 UNITS: 5000 INJECTION INTRAVENOUS; SUBCUTANEOUS at 20:47

## 2023-01-21 RX ADMIN — SODIUM CHLORIDE, PRESERVATIVE FREE 10 ML: 5 INJECTION INTRAVENOUS at 09:12

## 2023-01-21 RX ADMIN — PANTOPRAZOLE SODIUM 40 MG: 40 TABLET, DELAYED RELEASE ORAL at 06:57

## 2023-01-21 RX ADMIN — SODIUM CHLORIDE: 9 INJECTION, SOLUTION INTRAVENOUS at 23:56

## 2023-01-21 RX ADMIN — AMIODARONE HYDROCHLORIDE 200 MG: 200 TABLET ORAL at 09:09

## 2023-01-21 RX ADMIN — GABAPENTIN 100 MG: 100 CAPSULE ORAL at 14:03

## 2023-01-21 RX ADMIN — VANCOMYCIN HYDROCHLORIDE 1000 MG: 10 INJECTION, POWDER, LYOPHILIZED, FOR SOLUTION INTRAVENOUS at 16:14

## 2023-01-21 RX ADMIN — HEPARIN SODIUM 5000 UNITS: 5000 INJECTION INTRAVENOUS; SUBCUTANEOUS at 06:57

## 2023-01-21 RX ADMIN — SODIUM CHLORIDE: 9 INJECTION, SOLUTION INTRAVENOUS at 16:10

## 2023-01-21 RX ADMIN — ATORVASTATIN CALCIUM 10 MG: 10 TABLET, FILM COATED ORAL at 20:46

## 2023-01-21 RX ADMIN — CEFEPIME HYDROCHLORIDE 2000 MG: 2 INJECTION, POWDER, FOR SOLUTION INTRAMUSCULAR; INTRAVENOUS at 00:35

## 2023-01-21 RX ADMIN — CEFEPIME HYDROCHLORIDE 2000 MG: 2 INJECTION, POWDER, FOR SOLUTION INTRAMUSCULAR; INTRAVENOUS at 12:02

## 2023-01-21 RX ADMIN — SODIUM CHLORIDE, PRESERVATIVE FREE 10 ML: 5 INJECTION INTRAVENOUS at 09:13

## 2023-01-21 RX ADMIN — SODIUM CHLORIDE, PRESERVATIVE FREE 10 ML: 5 INJECTION INTRAVENOUS at 20:47

## 2023-01-21 RX ADMIN — GABAPENTIN 100 MG: 100 CAPSULE ORAL at 20:46

## 2023-01-21 RX ADMIN — LEVOTHYROXINE SODIUM 112 MCG: 0.11 TABLET ORAL at 06:57

## 2023-01-21 RX ADMIN — SODIUM CHLORIDE: 9 INJECTION, SOLUTION INTRAVENOUS at 00:33

## 2023-01-21 RX ADMIN — SODIUM CHLORIDE, PRESERVATIVE FREE 10 ML: 5 INJECTION INTRAVENOUS at 20:49

## 2023-01-21 RX ADMIN — OLANZAPINE 5 MG: 5 TABLET, FILM COATED ORAL at 20:46

## 2023-01-21 RX ADMIN — ASPIRIN 81 MG: 81 TABLET, COATED ORAL at 09:09

## 2023-01-21 ASSESSMENT — PAIN DESCRIPTION - ORIENTATION
ORIENTATION: LEFT
ORIENTATION: LEFT

## 2023-01-21 ASSESSMENT — PAIN DESCRIPTION - ONSET
ONSET: ON-GOING
ONSET: ON-GOING

## 2023-01-21 ASSESSMENT — PAIN SCALES - GENERAL
PAINLEVEL_OUTOF10: 0

## 2023-01-21 ASSESSMENT — PAIN DESCRIPTION - DESCRIPTORS
DESCRIPTORS: DISCOMFORT
DESCRIPTORS: DISCOMFORT

## 2023-01-21 ASSESSMENT — PAIN DESCRIPTION - PAIN TYPE: TYPE: ACUTE PAIN;SURGICAL PAIN

## 2023-01-21 ASSESSMENT — PAIN DESCRIPTION - LOCATION
LOCATION: HIP
LOCATION: HIP

## 2023-01-21 ASSESSMENT — PAIN DESCRIPTION - FREQUENCY
FREQUENCY: CONTINUOUS
FREQUENCY: CONTINUOUS

## 2023-01-21 ASSESSMENT — PAIN DESCRIPTION - DIRECTION
RADIATING_TOWARDS: LLE
RADIATING_TOWARDS: LLE

## 2023-01-21 NOTE — PROGRESS NOTES
Clinical Pharmacy Progress Note    Vancomycin - Management by Pharmacy    Consult Date(s): 1/17/23  Consulting Provider(s): Dr. Jeff Thrasher / Plan  1)  Post-op fevers - Vancomycin  Concurrent Antimicrobials: Cefepime (Day 4/7)  Day of Vanc Therapy:  4  Current Dosing Method: Bayesian-Guided AUC Dosing  Therapeutic Goal: -600 mg/L*hr  Current Dose / Plan:   Patient's renal function is stable at 0.8 mg/dL. Over the last 24 hours, UOP ~1.7 ml/kg/hr. Patient's current regimen is 1000mg q24h - dose reduced yesterday based on level. Estimated AUC is 460 mg/L.hr with trough 14 mg/L. Will continue current regimen for now. Repeat level may not be needed if pt is clinically improving, renal function remains stable, and short course of therapy is planned. Will monitor and order level if it becomes appropriate. Will continue to monitor clinical condition and make adjustments to regimen as appropriate. Please call with questions--  Thanks--  Vadim KolbD  PGY-1 Pharmacy Resident  [address]  [id number]  1/21/2023   12:58 PM        Interval update:  Patient remains afebrile and hemodynamically stable. Working on SNF placement. Subjective/Objective:   Avelino Parekh is a 80 y.o. female with a PMHx significant for HLD, OA, DVT, hypothyroidism, spinal stenosis, and b/l breast cancer who is admitted with left femur fracture. Pt is s/p left hip intertrochoanteric nailing (done 1/12/23). Post-op course has included treatment for encephalopathy / post-op delirium, new onset A.fib with RVR, and urinary retention. Broad spectrum antibiotics were started 1/17 for post-op fevers.     Pharmacy is consulted to dose Vancomycin    Ht Readings from Last 1 Encounters:   01/20/23 5' 1\" (1.549 m)     Wt Readings from Last 1 Encounters:   01/21/23 150 lb 9.2 oz (68.3 kg)     Current & Prior Antimicrobial Regimen(s):   (1/12-1/13)  Cefepime 2000mg EI q12h (1/17-current)  Vancomycin - Pharmacy to dose  1250mg IV q24h (1/17-1/18)  1000mg IV q24h (1/18-current)    Vancomycin Level(s) / Doses:    Date Time Dose Type of Level / Level Interpretation   1/19 05:40 1250 mg IV q24h Random = 13.5mcg/mL Drawn ~13.5h after 2nd dose  Calculated AUC = 564 with trough = 17.4  Decrease to 1000mg q24h          Note: Serum levels collected for AUC-based dosing may be high if collected in close proximity to the dose administered. This is not necessarily indicative of toxicity. Cultures & Sensitivities:    Date Site Micro Susceptibility / Result   1/17 Blood x2 No growth to date    1/19 MRSA screening cx In process      Recent Labs     01/19/23  0540 01/20/23  0500 01/20/23  0841 01/20/23  1558 01/21/23  1040   CREATININE 0.8  --  0.7  --  0.8   BUN 18  --  16  --  16   WBC 8.1 4.3  --  7.7 4.4         Estimated Creatinine Clearance: 40 mL/min (based on SCr of 0.8 mg/dL). Additional Lab Values / Findings of Note:    No results for input(s): PROCAL in the last 72 hours.

## 2023-01-21 NOTE — PROGRESS NOTES
Transfer to room 11 since the call bell is not working from her previous room, maintenance came they need the room to be empty since they need to fix it from the wall.

## 2023-01-21 NOTE — PLAN OF CARE
Problem: Musculoskeletal - Adult  Goal: Return mobility to safest level of function  Outcome: Progressing  Flowsheets  Taken 1/21/2023 0850 by Ruth Powell RN  Return Mobility to Safest Level of Function:   Assess patient stability and activity tolerance for standing, transferring and ambulating with or without assistive devices   Assist with transfers and ambulation using safe patient handling equipment as needed   Ensure adequate protection for wounds/incisions during mobilization   Apply continuous passive motion per provider or physical therapy orders to increase flexion toward goal   Instruct patient/family in ordered activity level  Problem: Musculoskeletal - Adult  Goal: Maintain proper alignment of affected body part  Outcome: Progressing  Flowsheets  Taken 1/21/2023 0850 by Ruth Powell RN  Maintain proper alignment of affected body part:   Support and protect limb and body alignment per provider's orders   Instruct and reinforce with patient and family use of appropriate assistive device and precautions (e.g. spinal or hip dislocation precautions)    Problem: Safety - Adult  Goal: Free from fall injury  1/21/2023 1056 by Ruth Mahmood RN   Maintain proper alignment of affected body part: Support and protect limb and body alignment per provider's orders  Outcome: Progressing  Problem: Discharge Planning  Goal: Discharge to home or other facility with appropriate resources  Outcome: Progressing

## 2023-01-21 NOTE — PROGRESS NOTES
Hospitalist Progress Note      PCP: Olimpia Spring    Date of Admission: 1/12/2023    Chief Complaint: worsening lethargy      Subjective:     Patient seen and examined. Patient continues to improve. Denies any new complaints. Awaiting placement. Medications:  Reviewed    Infusion Medications    lactated ringers IV soln 60 mL/hr at 01/20/23 0456    sodium chloride 15 mL/hr at 01/21/23 1610     Scheduled Medications    vancomycin  1,000 mg IntraVENous Q24H    cefepime  2,000 mg IntraVENous Q12H    aspirin  81 mg Oral Daily    amiodarone  200 mg Oral Daily    [Held by provider] metoprolol succinate  12.5 mg Oral Daily    heparin (porcine)  5,000 Units SubCUTAneous 3 times per day    OLANZapine  5 mg Oral Nightly    lidocaine  2 patch TransDERmal Daily    gabapentin  100 mg Oral TID    sodium chloride flush  5-40 mL IntraVENous 2 times per day    levothyroxine  112 mcg Oral QAM AC    atorvastatin  10 mg Oral Nightly    pantoprazole  40 mg Oral QAM AC    midazolam  0.5 mg IntraVENous Once    sodium chloride flush  5-40 mL IntraVENous 2 times per day     PRN Meds: lidocaine, perflutren lipid microspheres, sodium chloride flush, ondansetron **OR** ondansetron, polyethylene glycol, acetaminophen **OR** acetaminophen, clonazePAM, oxyCODONE **OR** oxyCODONE, simethicone, sodium chloride flush, sodium chloride      Intake/Output Summary (Last 24 hours) at 1/21/2023 1715  Last data filed at 1/21/2023 1614  Gross per 24 hour   Intake 580 ml   Output 1950 ml   Net -1370 ml         Physical Exam Performed:    /63   Pulse 94   Temp 97.7 °F (36.5 °C) (Axillary)   Resp 18   Ht 5' 1\" (1.549 m)   Wt 150 lb 9.2 oz (68.3 kg)   SpO2 95%   BMI 28.45 kg/m²     GEN more fatigued.   HEENT normocephalic, anicteric sclera, EOMI, mucosa moist, no stridor  NECK supple, trachea midline  RESP on 2-3LPM, in no distress, clear to auscultation anteriorly, decreased at right base  CARDS RRR, S1, S2, systolic murmurs over RUSB, LUSB, left>right upper extremity edema, left thigh edema and bilateral leg edema, radial pulse 2+, DP pulse 2+  ABD +BS, soft nontender   marrero catheter in place  MSK left hip tenderness, edema, no cyanosis, no clubbing  SKIN warm, dry  NEURO some fatigue, oriented x 3, no facial asymmetry, no dysarthria, moving spontaneously  PSYCH normal mood      Labs:   Recent Labs     01/20/23  0500 01/20/23  1558 01/21/23  1040   WBC 4.3 7.7 4.4   HGB 7.2* 9.7*  9.7* 8.3*   HCT 20.6* 28.1*  28.0* 24.6*    246 183       Recent Labs     01/19/23  0540 01/20/23  0841 01/21/23  1040    134* 139   K 4.3 4.4 4.2    99 103   CO2 27 26 28   BUN 18 16 16   CREATININE 0.8 0.7 0.8   CALCIUM 8.9 9.1 9.3   PHOS 2.8 3.4  --        Recent Labs     01/19/23  0540 01/20/23  0841 01/21/23  1040   AST 19 20 22   ALT 13 11 13   BILITOT 1.0 0.9 0.9   ALKPHOS 101 68 62       No results for input(s): INR in the last 72 hours. No results for input(s): Prentis Revels in the last 72 hours. Urinalysis:      Lab Results   Component Value Date/Time    NITRU Negative 01/17/2023 01:22 PM    [address] None seen 01/17/2023 01:22 PM    BACTERIA 1+ 01/17/2023 01:22 PM    RBCUA None seen 01/17/2023 01:22 PM    BLOODU Negative 01/17/2023 01:22 PM    SPECGRAV 1.025 01/17/2023 01:22 PM    GLUCOSEU Negative 01/17/2023 01:22 PM       Radiology:  CT HEAD WO CONTRAST   Final Result      Chronic ischemic changes of the brain and atrophy with no midline shift or hydrocephalus or definitive hemorrhage. Extensive artifact noted likely from patient motion            XR TIBIA FIBULA LEFT (2 VIEWS)   Final Result      Normal radiographs of the left tibia and fibula. Knee compartment narrowing appreciated. Left ankle 3 view:      HISTORY: Trauma:      FINDINGS:      AP lateral oblique views demonstrate normal alignment.     There is no acute fracture or dislocation      IMPRESSION:      No acute fracture       LEFT HIP 2 VIEW , AP view pelvis      HISTORY: Ankita Riding with hip pain, previous left hip pinning      PROCEDURE: AP view the pelvis and lateral view of left hip were obtained      COMPARISON: Intraoperative studies from January 12, 2023      FINDINGS:      There is normal alignment of the right hip with 4 screws noted traversing the trochanter femoral neck and head from previous fracture fixation without significant displacement, unchanged. Left trochanteric hip fixation is intact with transfixation screws through the trochanter femoral neck and long intramedullary amaris remaining in stable position compared to recent study. No discrete acute fracture or dislocation. No expansile cystic or destructive change identified      AP view of the pelvis demonstrates no acute bony defect. Diffuse demineralization noted. Degenerative changes lower lumbar spine. The sacroiliac margins appear normal.    There is some minimal sclerosis along the acetabulum      IMPRESSION:      No acute deformity involving the right or left hip. Previous pinning of the right hip is unchanged. Previous fracture fixation of the left trochanteric and hip is intact      No acute bony defect in the pelvis. Diffuse demineralization            XR SHOULDER LEFT (MIN 2 VIEWS)   Final Result      Chronic advanced arthropathy of the left shoulder with loose bodies and subarticular erosions affecting the humeral head and advanced glenohumeral arthritic change and remodeling         XR ANKLE LEFT (2 VIEWS)   Final Result      Normal radiographs of the left tibia and fibula. Knee compartment narrowing appreciated. Left ankle 3 view:      HISTORY: Trauma:      FINDINGS:      AP lateral oblique views demonstrate normal alignment.     There is no acute fracture or dislocation      IMPRESSION:      No acute fracture       LEFT HIP 2 VIEW , AP view pelvis      HISTORY: Ankita Riding with hip pain, previous left hip pinning      PROCEDURE: AP view the pelvis and lateral view of left hip were obtained      COMPARISON: Intraoperative studies from January 12, 2023      FINDINGS:      There is normal alignment of the right hip with 4 screws noted traversing the trochanter femoral neck and head from previous fracture fixation without significant displacement, unchanged. Left trochanteric hip fixation is intact with transfixation screws through the trochanter femoral neck and long intramedullary amaris remaining in stable position compared to recent study. No discrete acute fracture or dislocation. No expansile cystic or destructive change identified      AP view of the pelvis demonstrates no acute bony defect. Diffuse demineralization noted. Degenerative changes lower lumbar spine. The sacroiliac margins appear normal.    There is some minimal sclerosis along the acetabulum      IMPRESSION:      No acute deformity involving the right or left hip. Previous pinning of the right hip is unchanged. Previous fracture fixation of the left trochanteric and hip is intact      No acute bony defect in the pelvis. Diffuse demineralization            XR KNEE RIGHT (1-2 VIEWS)   Final Result      1. Right knee: Advanced arthritic change as described above. No sign of any fracture or acute deformity. 2. Left knee: Moderate degenerative change particularly involving the lateral compartment with some prepatellar soft tissue swelling. No acute defect      XR HIP 2-3 VW W PELVIS LEFT   Final Result      Normal radiographs of the left tibia and fibula. Knee compartment narrowing appreciated. Left ankle 3 view:      HISTORY: Trauma:      FINDINGS:      AP lateral oblique views demonstrate normal alignment.     There is no acute fracture or dislocation      IMPRESSION:      No acute fracture       LEFT HIP 2 VIEW , AP view pelvis      HISTORY: Vanessa Escobar with hip pain, previous left hip pinning      PROCEDURE: AP view the pelvis and lateral view of left hip were obtained      COMPARISON: Intraoperative studies from January 12, 2023      FINDINGS:      There is normal alignment of the right hip with 4 screws noted traversing the trochanter femoral neck and head from previous fracture fixation without significant displacement, unchanged. Left trochanteric hip fixation is intact with transfixation screws through the trochanter femoral neck and long intramedullary amaris remaining in stable position compared to recent study. No discrete acute fracture or dislocation. No expansile cystic or destructive change identified      AP view of the pelvis demonstrates no acute bony defect. Diffuse demineralization noted. Degenerative changes lower lumbar spine. The sacroiliac margins appear normal.    There is some minimal sclerosis along the acetabulum      IMPRESSION:      No acute deformity involving the right or left hip. Previous pinning of the right hip is unchanged. Previous fracture fixation of the left trochanteric and hip is intact      No acute bony defect in the pelvis. Diffuse demineralization            XR KNEE LEFT (1-2 VIEWS)   Final Result      1. Right knee: Advanced arthritic change as described above. No sign of any fracture or acute deformity. 2. Left knee: Moderate degenerative change particularly involving the lateral compartment with some prepatellar soft tissue swelling. No acute defect      XR HIP LEFT (2-3 VIEWS)   Final Result      Intraoperative fluoroscopy for open reduction internal fixation of the left hip. Refer to the operative note for details. FLUORO FOR SURGICAL PROCEDURES   Final Result      Intraoperative fluoroscopy for open reduction internal fixation of the left hip. Refer to the operative note for details. CT CHEST ABDOMEN PELVIS W CONTRAST Additional Contrast? None   Final Result      CHEST:   1.  Small right pleural effusion and trace left pleural effusion. Mild bibasilar atelectasis.    2.  Stable remote compression deformities at T12 and L1 compared to prior MRI from 2015. There is also new compression deformity at T11 compared to the study, but this is age indeterminant and may also be remote. Recommend correlation with point    tenderness in this level.   3.  Motion limited exam without definite evidence of acute traumatic intrathoracic abnormality.   4.  Moderate hiatal hernia.      ABDOMEN AND PELVIS:   1.  Mildly displaced comminuted intratrochanteric fracture of the left femur.   2.  Surrounding stranding/hematoma in the left thigh as detailed above.   3.  No additional acute traumatic abnormality identified in the abdomen and pelvis per         CT CERVICAL SPINE WO CONTRAST   Final Result   1. No acute traumatic abnormality of the cervical spine.    2. Stable multilevel degenerative disc disease and facet arthropathy.   3. Stable slight rotatory subluxation of C1 on C2 on the right side.         CT HEAD WO CONTRAST   Final Result      1. Stable exam with no acute intracranial abnormality.         XR FEMUR LEFT (MIN 2 VIEWS)   Final Result   Addendum (preliminary) 1 of 1   [ADDENDUM #1   Addendum: Initial report by Dr. River. Addendum by Dr. Ferreira.      Frontal view of the pelvis and frontal and frog-leg lateral views of the    left femur were obtained. There is a nondisplaced intertrochanteric    fracture of the left femur. No dislocation. Orthopedic hardware of the    proximal right femur is noted. No    additional fracture deformity.         Final   Nondisplaced intertrochanteric fracture on the left.   IMPRESSION:      No pneumothorax.      Diffusely prominent interstitial markings presumably representing chronic interstitial lung disease. No prior for comparison.      Normal cardiomediastinal silhouette.      Presumed old fracture deformity of the proximal left humerus. If there is left shoulder pain then dedicated radiographs are indicated.      XR PELVIS (1-2 VIEWS)   Final Result   Addendum (preliminary) 1 of 1   [ ADDENDUM #1 *   Addendum:  Initial report by Dr. Joe Kapoor. Addendum by Dr. Srinivas Davidson. Frontal view of the pelvis and frontal and frog-leg lateral views of the    left femur were obtained. There is a nondisplaced intertrochanteric    fracture of the left femur. No dislocation. Orthopedic hardware of the    proximal right femur is noted. No    additional fracture deformity. Final   Nondisplaced intertrochanteric fracture on the left. IMPRESSION:      No pneumothorax. Diffusely prominent interstitial markings presumably representing chronic interstitial lung disease. No prior for comparison. Normal cardiomediastinal silhouette. Presumed old fracture deformity of the proximal left humerus. If there is left shoulder pain then dedicated radiographs are indicated. XR CHEST PORTABLE   Final Result   Addendum (preliminary) 1 of 1   [ADDENDUM #1    Addendum: Initial report by Dr. Joe Kapoor. Addendum by Dr. Srinivas Davidson. Frontal view of the pelvis and frontal and frog-leg lateral views of the    left femur were obtained. There is a nondisplaced intertrochanteric    fracture of the left femur. No dislocation. Orthopedic hardware of the    proximal right femur is noted. No    additional fracture deformity. Final   Nondisplaced intertrochanteric fracture on the left. IMPRESSION:      No pneumothorax. Diffusely prominent interstitial markings presumably representing chronic interstitial lung disease. No prior for comparison. Normal cardiomediastinal silhouette. Presumed old fracture deformity of the proximal left humerus. If there is left shoulder pain then dedicated radiographs are indicated. Assessment/Plan:       This is a 79 yo Female, with history of bilateral breast cancer, s/p left mastectomy 1975, right lumpectomy and radiation in 1999, DVT 2009 while on Evista, hypothyroidism, esophageal spasm, schatzki's ring, EGD with dilation 1/2011, GERD, hepatitis B, OA, spinal stenosis, Rt Hip Fracture s/p surgery 2/2010, Thoracic and Lumbar compression fractures, who presents for evaluation after fall. She was found to have sustained mildly displaced comminuted intratrochanteric fracture of the left femur. Patient is alert, oriented, and coherent. Daughter in-law, Jose Magdaleno, is present and assists with clinical history. This morning patient walked from her bedroom to the bathroom. When she reached the bathroom, she felt lightheaded. States that she must have turned around, lost her balance, and fell onto her left side. After falling she was in significant pain and was unable to rise on her own. Patient has a  who comes every morning to walk her dog at ~ 730 AM.    Knowing that her  was coming, patient screamed out for help. Reportedly she was found likely on her left side on the bathroom floor. Her  called EMS and patient was taken to [facility], [facility]. for evaluation. Patient has been in her usual state of health. She walks about 1 mile daily without assistance. She used to walk longer distances. Patient denies recent fevers, chills, cough, shortness of breath, chest pain, palpitations at rest and on exertion. She denies history of nausea, vomiting, abdominal pain, dysuria, diarrhea. She eats three meals daily. States that she does not eat much during lunchtime. Daughter in-law, Jose Magdaleno said patient had another fall in February 2022 while walking her dog; she was found on the side walk. After, she has been working with PT/OT to work on her strength and stability. She may have fallen a couple of times after.      Active Hospital Problems    Diagnosis Date Noted    Closed fracture of left hip ([facility] [address] [S72.002A] 01/16/2023     Priority: Medium    Other fracture of left femur, initial encounter for closed fracture ([facility] [address] [S72.8X2A] 01/12/2023     Priority: Medium     PostOp Fever  Possible Pneumonia  - fever up to 102.3 F.  - new leukocytosis with WBC 13.3K with left shift on 1/17/23.  - Procalcitonin 0.41, from 0.19 on 1/13/23.  - confusion.  - UA 1/17 showed trace protein, no nitrite, no leuk est, WBC not seen. - blood cultures 1/17 negative to date. - CXR 1/17 showed bilateral pleural-parenchymal opacities suggesting moderate large pleural effusions and airspace disease, right greater than left. Pulmonary edema suspected. Pneumonia not excluded. No pneumothorax. - Started IV vancomycin, cefepime (1/17-). - MRSA nasal swab. D/c vancomycin of MRSA screen negative   1/20: Continue cefepime through 1/23. White blood count has normalized. Continue to monitor. Encephalopathy, Post-op Delirium, Fevers, Possible Pneumonia  - became confused on 1/14 and pulled out IV's. - had to place in soft restraints for safety. - in the setting of post-op and elderly age. Adequate hydration and pain control.  - placed in restraints on 1/14-1/15,  - mental clear and oriented x 3 on 1/15. She was understanding about being in restraints so that she would not inadvertently pull on her IV lines. - off restraints on 1/16. New Onset Atrial Fibrillation with RVR on 1//14  - RVR with HR to 160's and became hypotensive to SBP 70's in the setting of severe AS  - Received 1L NS bolus. - Received amiodarone bolus 150 mg (1/14), then started infusion of 1 mg/min for 6 hours, followed by 0.5 mg/min. -- she converted back NSR after amiodarone bolus and start of 1mg/min infusion. -- consider anticoagulation in the setting valvular disease, however, she is 79 yo. The onset of atrial fibrillation, converted to NSR < 48 hours. - Monitor and discuss with family. - start ASA 81 mg daily. Still contemplating AC.  - Cardiology evaluated patient and started on amiodarone 200 mg daily. 1/20: I doubt this patient would be a candidate for anticoagulation with blood thinners due to her fall history.   If any antiplatelet therapy was absolutely necessary, I would err on that side as opposed to anticoagulation. Perhaps a baby aspirin upon discharge. Postoperative anemia/acute  1/20: Hemoglobin has trended down fairly quickly from I half to 1 g daily. Hemoglobin 7.2 on today. Ordered a repeat hemoglobin this afternoon and I am awaiting results. Will transfuse if less than 7.0. This has been discussed with both the patient and her son. It does not seem to be any obvious source of bleeding. Seems to be clearly postoperative. No evidence of blood pooling around the wound. Monitor closely. 1/21: Repeat hemoglobin on yesterday afternoon was much improved. Continue to monitor hemoglobin routinely. No need for transfusion at this time. Acute Hypoxic Respiratory Failure  Bilateral Pleural Effusion  ? Pulmonary Edema vs Pneumonia  - in the setting right pleural effusion and possible pneumonia. - CT Chest 1/12 showed small right pleural effusion, trace left pleural effusion. Mild basilar atelectasis. - CXR (portable) 1/17 showed moderate to large pleural effusions and airspace disease in right > left lungs, favoring pulmonary edema. Pneumonia not excluded. - diuresis as below in Edema section. On 3 L O2   May order CT chest if no improvement       Edema  - in the setting of immobility and severe AS, receiving low volume IVF's for borderline BP.  - start IV lasix (1/18-) with low rate IVF's to achieve diuresis in the setting of borderline BP and to protect her kidney. Severe Aortic Stenosis, Symptomatic  Borderline BP  LV with Grade II Diastolic Dysfunction  Mild to Moderate Mitral Regurgitation  Mild to Moderate Mitral Stenosis  - ECHO 1/12/23 showed normal LV cavity size, wall thickness, LVEF 60-65%, no regional wall motion abnormalities. Grade II diastolic dysfunction. Mitral valve leaflets are thickened/calcified. MAC. Mild to moderate MR and MS. Severe aortic stenosis with peak velocity of 5.3 m/s, mean pressure gradient of 66 mmHg. Mild TR. PSAP 48 mmHg. - Reports lightheadedness on rising. When she fell on 1/12 -- when she walked to the bathroom she was not initially lightheaded; after reaching the bathroom she felt lightheaded. She denies shortness of breath, chest pain, palpitations on exertion. - cardiology consulted for consideration of TAVR. Patient extremely functional; walks 1 mile daily without assistive device. - Cardiology evaluated patient. Recommended outpatient evaluation for TAVR. Mildly Displaced Comminuted Intra-trochanteric Fracture of Left Femur  - CT A/P with contrast 1/12/23 showed mildly displaced comminuted intratrochanteric fracture of the left femur. Surrounding stranding/hematoma in the left thigh as detailed above. - s/p ORIF of left femoral fracture with CMN on 1/12/23  - monitor for post-op complications. - Pain control -- sensitive to narcotics. - Bowel regimen. Add lidocaine patch      Recurrent Falls  Lightheadedness  - walks 1 mile daily without assistive device. - Had a fall in February 2022 while walking her dog.  - she may have fallen a couple more times after, then today 1/12. -- prior to falling today -- she felt lightheaded, turned and lost her balance resulting in her fall. -- adm labs showed elevated lactic acid and ECHO showed severe aortic stenosis. - CT of Head wo contrast 1/12 showed no acute intracranial process. - CT Cervical Spine wo contrast 1/12 showed no acute traumatic abnormality of the cervical spine. Stable multilevel degenerative disc disease and facet arthropathy. Stable slight rotatory subluxation of C1 on C2 on the right side.  - B12 636, folate >20, vitamin D5OH 22.8 on 1/13/23.  - Started vitamin D3 2000 units daily. Elevated Lactic Acid  - adm lactic acid 3.0.  - Administered 2 L LR, then started LR infusion 75 cc/hr. - lactic acid normalized after receiving IVF's.      Small Right Pleural Effusion  Trace Left Pleural Effusion  - in the setting of valvular disease and LV with grade II diastolic dysfunction,    Urinary Retention  - bladder scan showed > 500 cc urine.    - marrero placed on 1/12. Rt Upper Extremity Acute Totally Occluding Superficial Venous Thrombosis of Cephalic Vein From Wrist to Mid Forearm in the setting of IV lines, patient also pulled out IV's. - on Venous US of UE on 1/17.  - warm compresses. - distal superficial thrombus. - supportive care. Hypothyroidism  - TSH 1.15, free T4 1.1 on 1/13/23.  - Continue home levothyroxine 112 mcg daily.  - TSH, free T4 (pharmacy verified that she has not filled her prescription in several months.)  - Recheck TFT in 1 month. Remote Compression Deformities at T12, L1  New Compression Deformity at T11  OA  - vitamin D25OH level 22.8 on 1/13/23.  - start vitamin D3 2000 units daily. History of Rt Hip Fracture   - s/p surgery 2/2010     History of bilateral breast cancer  - s/p left mastectomy 1975  - s/p right lumpectomy and radiation in 1999     DVT 2009   - thought possible related to Evista  - DVT ppx. Moderate Hiatal Hernia  GERD  History of Esophageal spasm  History of Schatzki's ring  - S/p EGD with dilation 1/2011  - Continue home omeprazole 40 mg daily. History of Hepatitis B       DVT Prophylaxis:   Diet: ADULT ORAL NUTRITION SUPPLEMENT; Breakfast, Lunch, Dinner; Standard High Calorie/High Protein Oral Supplement  ADULT DIET;  Regular; Low Fat/Low Chol/High Fiber/2 gm Na  Code Status: Full Code    PT/OT Eval Status: PT/OT    Dispo - once bed available    Baldo Martinez MD

## 2023-01-21 NOTE — PROGRESS NOTES
Physician Progress Note      PATIENT:               Nasra Sandy  CSN #:                  [id number]  :                       1930  ADMIT DATE:       2023 8:42 AM  DISCH DATE:  Carl Hardwick  PROVIDER #:        Kamila Waller MD          QUERY TEXT:    Pt admitted with left  Intertrochanteric Femoral Fracture following fall to   floor from standing height. Pt noted to have osteoporosis. If possible,   please document in progress notes and discharge summary the relationship, if   any, between OP and FX. The medical record reflects the following:  Risk Factors: 89 y.o.f. w/ OP  Clinical Indicators: Per ED \"slipped on the tile floor and this is what caused   her fall\". PMH: osteoporosis with prior fractures.  XR pelvis: Diffuse   demineralization noted. Per H&P \"New Compression Deformity at T11- OA- vitamin   D25OH level\". Per Endocrinology office visit  \"Osteoporosis, bone density   lower than desirable, with multiple fractures\"  Treatment: Vit D level ordered, Prolia Q 6 mos, IM Nailing  Options provided:  -- [Osteoporotic fracture following fall which would not usually break a   normal, healthy bone  -- Traumatic  left intertrochanteric femoral fracture fracture  -- Other - I will add my own diagnosis  -- Disagree - Not applicable / Not valid  -- Disagree - Clinically unable to determine / Unknown  -- Refer to Clinical Documentation Reviewer    PROVIDER RESPONSE TEXT:    This patient has an osteoporotic left intertrochanteric femoral fracture   following fall which would not break a normal, healthy bone. Query created by: Justus Tracey on 2023 10:05 PM      QUERY TEXT:    Patient admitted with femoral fx, noted to have \"Elevated Lactic Acid,   Administered 2 L LR, then started LR infusion\". If possible, please document   in progress notes and discharge summary if you are evaluating and/or treating   any of the following:     The medical record reflects the following:  Risk Factors: fall  Clinical Indicators: LA: 3- 0.9. Per H&P \"Elevated Lactic Acid- adm lactic   acid 3.0.- Administered 2 L LR, then started LR infusion 75 cc/hr. - lactic   acid normalized after receiving IVF's\"  Treatment: 2L LR bolus, IVF @ 75,  LA trend  Options provided:  -- Lactic acidosis POA, resolved  -- Other - I will add my own diagnosis  -- Disagree - Not applicable / Not valid  -- Disagree - Clinically unable to determine / Unknown  -- Refer to Clinical Documentation Reviewer    PROVIDER RESPONSE TEXT:    This patient has Lactic acidosis POA, resolved. Query created by: Wendie Townsend on 1/13/2023 10:10 PM      QUERY TEXT:    Pt admitted with left  Intertrochanteric Femoral Fracture, s/p repair. Pt   noted to have \"? Pulmonary Edema\", Bilateral Pleural Effusion & edema 1/18. Pt   with known diastolic CHF. If possible, please document in the progress notes   and discharge summary if you are evaluating and/or treating any of the   following: The medical record reflects the following:  Risk Factors: dCHF, maintenance IVF, mult NS & LR boluses  Clinical Indicators: 1/17 CXR: Moderate to large pleural effusions and   airspace disease in the right greater than left lungs. Pulmonary edema   favored. 1/18 Attending \"Bilateral Pleural Effusion, ? Pulmonary Edema vs   Pneumonia, Edema, start IV lasix (1/18-) with low rate IVF's to achieve   diuresis in the setting of borderline BP and to protect her kidney. \"  Treatment: 20 mg IVP Lasix 1/18 & 1/19 x2, CXR  Options provided:  -- Acute on chronic diastolic CHF  -- Noncardiogenic acute pulmonary edema  -- Other - I will add my own diagnosis  -- Disagree - Not applicable / Not valid  -- Disagree - Clinically unable to determine / Unknown  -- Refer to Clinical Documentation Reviewer    PROVIDER RESPONSE TEXT:    This patient has acute on chronic diastolic CHF. Query created by:  Wendie Townsend on 1/19/2023 12:29 PM      Electronically signed by: Sánchez Collier MD 1/21/2023 2:32 PM

## 2023-01-21 NOTE — PLAN OF CARE
Problem: Safety - Adult  Goal: Free from fall injury  Outcome: Progressing:  Remains free from falls. Problem: Pain  Goal: Verbalizes/displays adequate comfort level or baseline comfort level  Outcome: Progressing:  PRN  Oxycodone 10 mg PO at 2250 helpful for c/o left hip pain. Resting in bed, eyes closed. Medications per order. Bed in low position, call light in reach, bed alarm on for safety, will continue to monitor.

## 2023-01-22 PROCEDURE — 6370000000 HC RX 637 (ALT 250 FOR IP): Performed by: INTERNAL MEDICINE

## 2023-01-22 PROCEDURE — 6360000002 HC RX W HCPCS: Performed by: INTERNAL MEDICINE

## 2023-01-22 PROCEDURE — 6370000000 HC RX 637 (ALT 250 FOR IP): Performed by: STUDENT IN AN ORGANIZED HEALTH CARE EDUCATION/TRAINING PROGRAM

## 2023-01-22 PROCEDURE — 2580000003 HC RX 258: Performed by: STUDENT IN AN ORGANIZED HEALTH CARE EDUCATION/TRAINING PROGRAM

## 2023-01-22 PROCEDURE — 94761 N-INVAS EAR/PLS OXIMETRY MLT: CPT

## 2023-01-22 PROCEDURE — 2700000000 HC OXYGEN THERAPY PER DAY

## 2023-01-22 PROCEDURE — 1200000000 HC SEMI PRIVATE

## 2023-01-22 PROCEDURE — 2580000003 HC RX 258: Performed by: INTERNAL MEDICINE

## 2023-01-22 PROCEDURE — 51702 INSERT TEMP BLADDER CATH: CPT

## 2023-01-22 RX ADMIN — SODIUM CHLORIDE: 9 INJECTION, SOLUTION INTRAVENOUS at 11:42

## 2023-01-22 RX ADMIN — AMIODARONE HYDROCHLORIDE 200 MG: 200 TABLET ORAL at 09:15

## 2023-01-22 RX ADMIN — GABAPENTIN 100 MG: 100 CAPSULE ORAL at 14:15

## 2023-01-22 RX ADMIN — OLANZAPINE 5 MG: 5 TABLET, FILM COATED ORAL at 21:18

## 2023-01-22 RX ADMIN — ASPIRIN 81 MG: 81 TABLET, COATED ORAL at 09:15

## 2023-01-22 RX ADMIN — SODIUM CHLORIDE, PRESERVATIVE FREE 10 ML: 5 INJECTION INTRAVENOUS at 09:20

## 2023-01-22 RX ADMIN — PANTOPRAZOLE SODIUM 40 MG: 40 TABLET, DELAYED RELEASE ORAL at 05:50

## 2023-01-22 RX ADMIN — SODIUM CHLORIDE: 9 INJECTION, SOLUTION INTRAVENOUS at 16:07

## 2023-01-22 RX ADMIN — GABAPENTIN 100 MG: 100 CAPSULE ORAL at 21:17

## 2023-01-22 RX ADMIN — CEFEPIME HYDROCHLORIDE 2000 MG: 2 INJECTION, POWDER, FOR SOLUTION INTRAMUSCULAR; INTRAVENOUS at 11:45

## 2023-01-22 RX ADMIN — HEPARIN SODIUM 5000 UNITS: 5000 INJECTION INTRAVENOUS; SUBCUTANEOUS at 14:15

## 2023-01-22 RX ADMIN — CEFEPIME HYDROCHLORIDE 2000 MG: 2 INJECTION, POWDER, FOR SOLUTION INTRAMUSCULAR; INTRAVENOUS at 00:02

## 2023-01-22 RX ADMIN — HEPARIN SODIUM 5000 UNITS: 5000 INJECTION INTRAVENOUS; SUBCUTANEOUS at 05:50

## 2023-01-22 RX ADMIN — ATORVASTATIN CALCIUM 10 MG: 10 TABLET, FILM COATED ORAL at 21:18

## 2023-01-22 RX ADMIN — VANCOMYCIN HYDROCHLORIDE 1000 MG: 10 INJECTION, POWDER, LYOPHILIZED, FOR SOLUTION INTRAVENOUS at 16:07

## 2023-01-22 RX ADMIN — SODIUM CHLORIDE, PRESERVATIVE FREE 10 ML: 5 INJECTION INTRAVENOUS at 09:17

## 2023-01-22 RX ADMIN — HEPARIN SODIUM 5000 UNITS: 5000 INJECTION INTRAVENOUS; SUBCUTANEOUS at 21:17

## 2023-01-22 RX ADMIN — SODIUM CHLORIDE, PRESERVATIVE FREE 10 ML: 5 INJECTION INTRAVENOUS at 21:19

## 2023-01-22 RX ADMIN — LEVOTHYROXINE SODIUM 112 MCG: 0.11 TABLET ORAL at 07:37

## 2023-01-22 RX ADMIN — GABAPENTIN 100 MG: 100 CAPSULE ORAL at 09:15

## 2023-01-22 ASSESSMENT — PAIN DESCRIPTION - ONSET: ONSET: ON-GOING

## 2023-01-22 ASSESSMENT — PAIN DESCRIPTION - PAIN TYPE: TYPE: ACUTE PAIN;SURGICAL PAIN

## 2023-01-22 ASSESSMENT — PAIN DESCRIPTION - ORIENTATION: ORIENTATION: LEFT

## 2023-01-22 ASSESSMENT — PAIN DESCRIPTION - FREQUENCY: FREQUENCY: CONTINUOUS

## 2023-01-22 ASSESSMENT — PAIN DESCRIPTION - DESCRIPTORS: DESCRIPTORS: DISCOMFORT

## 2023-01-22 ASSESSMENT — PAIN DESCRIPTION - DIRECTION: RADIATING_TOWARDS: LLE

## 2023-01-22 ASSESSMENT — PAIN DESCRIPTION - LOCATION: LOCATION: HIP

## 2023-01-22 ASSESSMENT — PAIN - FUNCTIONAL ASSESSMENT: PAIN_FUNCTIONAL_ASSESSMENT: PREVENTS OR INTERFERES SOME ACTIVE ACTIVITIES AND ADLS

## 2023-01-22 NOTE — PLAN OF CARE
Problem: Pain  Goal: Verbalizes/displays adequate comfort level or baseline comfort level  1/22/2023 0828 by Nikhil Mahmood RN  Outcome: Progressing  Flowsheets (Taken 1/22/2023 0061)  Verbalizes/displays adequate comfort level or baseline comfort level:   Encourage patient to monitor pain and request assistance   Assess pain using appropriate pain scale   Administer analgesics based on type and severity of pain and evaluate response   Implement non-pharmacological measures as appropriate and evaluate response   Consider cultural and social influences on pain and pain management   Notify Licensed Independent Practitioner if interventions unsuccessful or patient reports new pain    Problem: ABCDS Injury Assessment  Goal: Absence of physical injury  Outcome: Progressing     Problem: Safety - Adult  Goal: Free from fall injury  1/22/2023 [id number] by Nikhil Lundy RN  Outcome: Progressing    Problem: Discharge Planning  Goal: Discharge to home or other facility with appropriate resources  1/22/2023 3852 by Greg Cavanaugh, RN  Outcome: Progressing

## 2023-01-22 NOTE — PROGRESS NOTES
Clinical Pharmacy Progress Note    Vancomycin - Management by Pharmacy    Consult Date(s): 1/17/23  Consulting Provider(s): Dr. Ian Anderson / Plan  1)  Post-op fevers - Vancomycin  Concurrent Antimicrobials: Cefepime (Day 5/7)  Day of Vanc Therapy: Day # 5  Current Dosing Method: Bayesian-Guided AUC Dosing  Therapeutic Goal: -600 mg/L*hr  Current Dose / Plan:   SCr was not drawn this morning. Patient's renal function was stable at 0.8 mg/dL yesterday. Over the last 24 hours, UOP ~1.5 ml/kg/hr. Patient's current regimen is 1000mg q24h   Estimated AUC is now 463 mg/L.hr with trough 14.1 mg/L. Will continue current regimen for now. Repeat level may not be needed if pt is clinically improving, renal function remains stable, and short course of therapy is planned. Will monitor and order level if it becomes appropriate. Will continue to monitor clinical condition and make adjustments to regimen as appropriate. Please call with questions--  Thanks--  Vadim ShawD  PGY-1 Pharmacy Resident  [facility]  [id number]  1/22/2023   9:46 AM        Interval update:  Patient remains afebrile and hemodynamically stable. Plan is to continue antibiotics until tomorrow. Working on SNF placement. Subjective/Objective:   Ehsan Thompson is a 80 y.o. female with a PMHx significant for HLD, OA, DVT, hypothyroidism, spinal stenosis, and b/l breast cancer who is admitted with left femur fracture. Pt is s/p left hip intertrochoanteric nailing (done 1/12/23). Post-op course has included treatment for encephalopathy / post-op delirium, new onset A.fib with RVR, and urinary retention. Broad spectrum antibiotics were started 1/17 for post-op fevers.     Pharmacy is consulted to dose Vancomycin    Ht Readings from Last 1 Encounters:   01/20/23 5' 1\" (1.549 m)     Wt Readings from Last 1 Encounters:   01/22/23 163 lb (73.9 kg)     Current & Prior Antimicrobial Regimen(s): (1/12-1/13)  Cefepime 2000mg EI q12h (1/17-current)  Vancomycin - Pharmacy to dose  1250mg IV q24h (1/17-1/18)  1000mg IV q24h (1/18-current)    Vancomycin Level(s) / Doses:    Date Time Dose Type of Level / Level Interpretation   1/19 05:40 1250 mg IV q24h Random = 13.5mcg/mL Drawn ~13.5h after 2nd dose  Calculated AUC = 564 with trough = 17.4  Decrease to 1000mg q24h          Note: Serum levels collected for AUC-based dosing may be high if collected in close proximity to the dose administered. This is not necessarily indicative of toxicity. Cultures & Sensitivities:    Date Site Micro Susceptibility / Result   1/17 Blood x2 No growth to date    1/19 MRSA screening cx Not detected      Recent Labs     01/20/23  0500 01/20/23  0841 01/20/23  1558 01/21/23  1040   CREATININE  --  0.7  --  0.8   BUN  --  16  --  16   WBC 4.3  --  7.7 4.4         Estimated Creatinine Clearance: 41 mL/min (based on SCr of 0.8 mg/dL). Additional Lab Values / Findings of Note:    No results for input(s): PROCAL in the last 72 hours.

## 2023-01-22 NOTE — PLAN OF CARE
Problem: Musculoskeletal - Adult  Goal: Return mobility to safest level of function  Outcome: Progressing  Flowsheets  Taken 1/22/2023 0005  Return Mobility to Safest Level of Function: Assess patient stability and activity tolerance for standing, transferring and ambulating with or without assistive devices  Taken 1/21/2023 1945  Return Mobility to Safest Level of Function: Assess patient stability and activity tolerance for standing, transferring and ambulating with or without assistive devices     Problem: Musculoskeletal - Adult  Goal: Maintain proper alignment of affected body part  Outcome: Progressing  Flowsheets  Taken 1/22/2023 0005  Maintain proper alignment of affected body part: Support and protect limb and body alignment per provider's orders  Taken 1/21/2023 1945  Maintain proper alignment of affected body part: Support and protect limb and body alignment per provider's orders     Problem: Musculoskeletal - Adult  Goal: Return ADL status to a safe level of function  Outcome: Progressing  Flowsheets  Taken 1/22/2023 0005  Return ADL Status to a Safe Level of Function: Administer medication as ordered  Taken 1/21/2023 1945  Return ADL Status to a Safe Level of Function: Administer medication as ordered     Problem: Safety - Adult  Goal: Free from fall injury  Outcome: Progressing     Problem: Discharge Planning  Goal: Discharge to home or other facility with appropriate resources  Outcome: Progressing     Problem: Pain  Goal: Verbalizes/displays adequate comfort level or baseline comfort level  Outcome: Progressing     Problem: Skin/Tissue Integrity  Goal: Absence of new skin breakdown  Description: 1. Monitor for areas of redness and/or skin breakdown  2. Assess vascular access sites hourly  3. Every 4-6 hours minimum:  Change oxygen saturation probe site  4.   Every 4-6 hours:  If on nasal continuous positive airway pressure, respiratory therapy assess nares and determine need for appliance change or resting period.   Outcome: Progressing

## 2023-01-22 NOTE — PROGRESS NOTES
Hospitalist Progress Note      PCP: Hola Mattson    Date of Admission: 1/12/2023    Chief Complaint: worsening lethargy      Subjective:     Patient seen and examined.  Resting well.  Apparently, patient had issues with shortness of breath overnight and required a treatment.  She tolerated that well and improved and was able to remain sleep for most of the night.  When seen this morning, she was resting comfortably with her sleep mask on.  She did respond to verbal communication and denied any new complaints.      Medications:  Reviewed    Infusion Medications    lactated ringers IV soln 60 mL/hr at 01/20/23 0456    sodium chloride 100 mL/hr at 01/21/23 2356     Scheduled Medications    vancomycin  1,000 mg IntraVENous Q24H    cefepime  2,000 mg IntraVENous Q12H    aspirin  81 mg Oral Daily    amiodarone  200 mg Oral Daily    [Held by provider] metoprolol succinate  12.5 mg Oral Daily    heparin (porcine)  5,000 Units SubCUTAneous 3 times per day    OLANZapine  5 mg Oral Nightly    lidocaine  2 patch TransDERmal Daily    gabapentin  100 mg Oral TID    sodium chloride flush  5-40 mL IntraVENous 2 times per day    levothyroxine  112 mcg Oral QAM AC    atorvastatin  10 mg Oral Nightly    pantoprazole  40 mg Oral QAM AC    midazolam  0.5 mg IntraVENous Once    sodium chloride flush  5-40 mL IntraVENous 2 times per day     PRN Meds: lidocaine, perflutren lipid microspheres, sodium chloride flush, ondansetron **OR** ondansetron, polyethylene glycol, acetaminophen **OR** acetaminophen, clonazePAM, oxyCODONE **OR** oxyCODONE, simethicone, sodium chloride flush, sodium chloride      Intake/Output Summary (Last 24 hours) at 1/22/2023 0943  Last data filed at 1/22/2023 0920  Gross per 24 hour   Intake 815 ml   Output 2350 ml   Net -1535 ml         Physical Exam Performed:    /77   Pulse 92   Temp 97.5 °F (36.4 °C) (Axillary)   Resp 18   Ht 5' 1\" (1.549 m)   Wt 163 lb (73.9 kg)   SpO2 94%   BMI 30.80  kg/m²     GEN more fatigued. HEENT normocephalic, anicteric sclera, EOMI, mucosa moist, no stridor  NECK supple, trachea midline  RESP on 2-3LPM, in no distress, clear to auscultation anteriorly, decreased at right base  CARDS RRR, S1, S2, systolic murmurs over RUSB, LUSB, left>right upper extremity edema, left thigh edema and bilateral leg edema, radial pulse 2+, DP pulse 2+  ABD +BS, soft nontender   marrero catheter in place  MSK left hip tenderness, edema, no cyanosis, no clubbing  SKIN warm, dry  NEURO some fatigue, oriented x 3, no facial asymmetry, no dysarthria, moving spontaneously  PSYCH normal mood      Labs:   Recent Labs     01/20/23  0500 01/20/23  1558 01/21/23  1040   WBC 4.3 7.7 4.4   HGB 7.2* 9.7*  9.7* 8.3*   HCT 20.6* 28.1*  28.0* 24.6*    246 183       Recent Labs     01/20/23  0841 01/21/23  1040   * 139   K 4.4 4.2   CL 99 103   CO2 26 28   BUN 16 16   CREATININE 0.7 0.8   CALCIUM 9.1 9.3   PHOS 3.4  --        Recent Labs     01/20/23  0841 01/21/23  1040   AST 20 22   ALT 11 13   BILITOT 0.9 0.9   ALKPHOS 68 62       No results for input(s): INR in the last 72 hours. No results for input(s): [place] in the last 72 hours. Urinalysis:      Lab Results   Component Value Date/Time    NITRU Negative 01/17/2023 01:22 PM    [address] None seen 01/17/2023 01:22 PM    BACTERIA 1+ 01/17/2023 01:22 PM    RBCUA None seen 01/17/2023 01:22 PM    BLOODU Negative 01/17/2023 01:22 PM    SPECGRAV 1.025 01/17/2023 01:22 PM    GLUCOSEU Negative 01/17/2023 01:22 PM       Radiology:  CT HEAD WO CONTRAST   Final Result      Chronic ischemic changes of the brain and atrophy with no midline shift or hydrocephalus or definitive hemorrhage. Extensive artifact noted likely from patient motion            XR TIBIA FIBULA LEFT (2 VIEWS)   Final Result      Normal radiographs of the left tibia and fibula. Knee compartment narrowing appreciated.       Left ankle 3 view:      HISTORY: Trauma: FINDINGS:      AP lateral oblique views demonstrate normal alignment. There is no acute fracture or dislocation      IMPRESSION:      No acute fracture       LEFT HIP 2 VIEW , AP view pelvis      HISTORY: Bita Elfrida with hip pain, previous left hip pinning      PROCEDURE: AP view the pelvis and lateral view of left hip were obtained      COMPARISON: Intraoperative studies from January 12, 2023      FINDINGS:      There is normal alignment of the right hip with 4 screws noted traversing the trochanter femoral neck and head from previous fracture fixation without significant displacement, unchanged. Left trochanteric hip fixation is intact with transfixation screws through the trochanter femoral neck and long intramedullary amaris remaining in stable position compared to recent study. No discrete acute fracture or dislocation. No expansile cystic or destructive change identified      AP view of the pelvis demonstrates no acute bony defect. Diffuse demineralization noted. Degenerative changes lower lumbar spine. The sacroiliac margins appear normal.    There is some minimal sclerosis along the acetabulum      IMPRESSION:      No acute deformity involving the right or left hip. Previous pinning of the right hip is unchanged. Previous fracture fixation of the left trochanteric and hip is intact      No acute bony defect in the pelvis. Diffuse demineralization            XR SHOULDER LEFT (MIN 2 VIEWS)   Final Result      Chronic advanced arthropathy of the left shoulder with loose bodies and subarticular erosions affecting the humeral head and advanced glenohumeral arthritic change and remodeling         XR ANKLE LEFT (2 VIEWS)   Final Result      Normal radiographs of the left tibia and fibula. Knee compartment narrowing appreciated. Left ankle 3 view:      HISTORY: Trauma:      FINDINGS:      AP lateral oblique views demonstrate normal alignment.     There is no acute fracture or dislocation IMPRESSION:      No acute fracture       LEFT HIP 2 VIEW , AP view pelvis      HISTORY: Emelia Hollingsworth with hip pain, previous left hip pinning      PROCEDURE: AP view the pelvis and lateral view of left hip were obtained      COMPARISON: Intraoperative studies from January 12, 2023      FINDINGS:      There is normal alignment of the right hip with 4 screws noted traversing the trochanter femoral neck and head from previous fracture fixation without significant displacement, unchanged. Left trochanteric hip fixation is intact with transfixation screws through the trochanter femoral neck and long intramedullary amaris remaining in stable position compared to recent study. No discrete acute fracture or dislocation. No expansile cystic or destructive change identified      AP view of the pelvis demonstrates no acute bony defect. Diffuse demineralization noted. Degenerative changes lower lumbar spine. The sacroiliac margins appear normal.    There is some minimal sclerosis along the acetabulum      IMPRESSION:      No acute deformity involving the right or left hip. Previous pinning of the right hip is unchanged. Previous fracture fixation of the left trochanteric and hip is intact      No acute bony defect in the pelvis. Diffuse demineralization            XR KNEE RIGHT (1-2 VIEWS)   Final Result      1. Right knee: Advanced arthritic change as described above. No sign of any fracture or acute deformity. 2. Left knee: Moderate degenerative change particularly involving the lateral compartment with some prepatellar soft tissue swelling. No acute defect      XR HIP 2-3 VW W PELVIS LEFT   Final Result      Normal radiographs of the left tibia and fibula. Knee compartment narrowing appreciated. Left ankle 3 view:      HISTORY: Trauma:      FINDINGS:      AP lateral oblique views demonstrate normal alignment.     There is no acute fracture or dislocation      IMPRESSION:      No acute fracture       LEFT HIP 2 VIEW , AP view pelvis      HISTORY: David Shoulder with hip pain, previous left hip pinning      PROCEDURE: AP view the pelvis and lateral view of left hip were obtained      COMPARISON: Intraoperative studies from January 12, 2023      FINDINGS:      There is normal alignment of the right hip with 4 screws noted traversing the trochanter femoral neck and head from previous fracture fixation without significant displacement, unchanged. Left trochanteric hip fixation is intact with transfixation screws through the trochanter femoral neck and long intramedullary amaris remaining in stable position compared to recent study. No discrete acute fracture or dislocation. No expansile cystic or destructive change identified      AP view of the pelvis demonstrates no acute bony defect. Diffuse demineralization noted. Degenerative changes lower lumbar spine. The sacroiliac margins appear normal.    There is some minimal sclerosis along the acetabulum      IMPRESSION:      No acute deformity involving the right or left hip. Previous pinning of the right hip is unchanged. Previous fracture fixation of the left trochanteric and hip is intact      No acute bony defect in the pelvis. Diffuse demineralization            XR KNEE LEFT (1-2 VIEWS)   Final Result      1. Right knee: Advanced arthritic change as described above. No sign of any fracture or acute deformity. 2. Left knee: Moderate degenerative change particularly involving the lateral compartment with some prepatellar soft tissue swelling. No acute defect      XR HIP LEFT (2-3 VIEWS)   Final Result      Intraoperative fluoroscopy for open reduction internal fixation of the left hip. Refer to the operative note for details. FLUORO FOR SURGICAL PROCEDURES   Final Result      Intraoperative fluoroscopy for open reduction internal fixation of the left hip. Refer to the operative note for details.       CT CHEST ABDOMEN PELVIS W CONTRAST Additional Contrast? None   Final Result      CHEST:   1.  Small right pleural effusion and trace left pleural effusion. Mild bibasilar atelectasis. 2.  Stable remote compression deformities at T12 and L1 compared to prior MRI from 2015. There is also new compression deformity at T11 compared to the study, but this is age indeterminant and may also be remote. Recommend correlation with point    tenderness in this level. 3.  Motion limited exam without definite evidence of acute traumatic intrathoracic abnormality. 4.  Moderate hiatal hernia. ABDOMEN AND PELVIS:   1.  Mildly displaced comminuted intratrochanteric fracture of the left femur. 2.  Surrounding stranding/hematoma in the left thigh as detailed above. 3.  No additional acute traumatic abnormality identified in the abdomen and pelvis per         CT CERVICAL SPINE WO CONTRAST   Final Result   1. No acute traumatic abnormality of the cervical spine. 2. Stable multilevel degenerative disc disease and facet arthropathy. 3. Stable slight rotatory subluxation of C1 on C2 on the right side. CT HEAD WO CONTRAST   Final Result      1. Stable exam with no acute intracranial abnormality. XR FEMUR LEFT (MIN 2 VIEWS)   Final Result   Addendum (preliminary) 1 of 1   [ADDENDUM #1   Addendum: Initial report by Dr. Michelet Roberts. Addendum by Dr. Aruna Lezama. Frontal view of the pelvis and frontal and frog-leg lateral views of the    left femur were obtained. There is a nondisplaced intertrochanteric    fracture of the left femur. No dislocation. Orthopedic hardware of the    proximal right femur is noted. No    additional fracture deformity. Final   Nondisplaced intertrochanteric fracture on the left. IMPRESSION:      No pneumothorax. Diffusely prominent interstitial markings presumably representing chronic interstitial lung disease. No prior for comparison. Normal cardiomediastinal silhouette.       Presumed old fracture deformity of the proximal left humerus. If there is left shoulder pain then dedicated radiographs are indicated. XR PELVIS (1-2 VIEWS)   Final Result   Addendum (preliminary) 1 of 1   [ ADDENDUM #1 *   Addendum: Initial report by Dr. Aleena Weaver. Addendum by Dr. Estefani Lomeli. Frontal view of the pelvis and frontal and frog-leg lateral views of the    left femur were obtained. There is a nondisplaced intertrochanteric    fracture of the left femur. No dislocation. Orthopedic hardware of the    proximal right femur is noted. No    additional fracture deformity. Final   Nondisplaced intertrochanteric fracture on the left. IMPRESSION:      No pneumothorax. Diffusely prominent interstitial markings presumably representing chronic interstitial lung disease. No prior for comparison. Normal cardiomediastinal silhouette. Presumed old fracture deformity of the proximal left humerus. If there is left shoulder pain then dedicated radiographs are indicated. XR CHEST PORTABLE   Final Result   Addendum (preliminary) 1 of 1   [ADDENDUM #1    Addendum: Initial report by Dr. Aleena Weaver. Addendum by Dr. Estefani Lomeli. Frontal view of the pelvis and frontal and frog-leg lateral views of the    left femur were obtained. There is a nondisplaced intertrochanteric    fracture of the left femur. No dislocation. Orthopedic hardware of the    proximal right femur is noted. No    additional fracture deformity. Final   Nondisplaced intertrochanteric fracture on the left. IMPRESSION:      No pneumothorax. Diffusely prominent interstitial markings presumably representing chronic interstitial lung disease. No prior for comparison. Normal cardiomediastinal silhouette. Presumed old fracture deformity of the proximal left humerus. If there is left shoulder pain then dedicated radiographs are indicated. Assessment/Plan:       This is a 79 yo Female, with history of bilateral breast cancer, s/p left mastectomy 1975, right lumpectomy and radiation in 1999, DVT 2009 while on [place], hypothyroidism, esophageal spasm, schatzki's ring, EGD with dilation 1/2011, GERD, hepatitis B, OA, spinal stenosis, Rt Hip Fracture s/p surgery 2/2010, Thoracic and Lumbar compression fractures, who presents for evaluation after fall. She was found to have sustained mildly displaced comminuted intratrochanteric fracture of the left femur. Patient is alert, oriented, and coherent. Daughter in-law, Beatris Naik, is present and assists with clinical history. This morning patient walked from her bedroom to the bathroom. When she reached the bathroom, she felt lightheaded. States that she must have turned around, lost her balance, and fell onto her left side. After falling she was in significant pain and was unable to rise on her own. Patient has a  who comes every morning to walk her dog at ~ 730 AM.    Knowing that her  was coming, patient screamed out for help. Reportedly she was found likely on her left side on the bathroom floor. Her  called EMS and patient was taken to [facility] for evaluation. Patient has been in her usual state of health. She walks about 1 mile daily without assistance. She used to walk longer distances. Patient denies recent fevers, chills, cough, shortness of breath, chest pain, palpitations at rest and on exertion. She denies history of nausea, vomiting, abdominal pain, dysuria, diarrhea. She eats three meals daily. States that she does not eat much during lunchtime. Daughter in-law, Beatris Naik said patient had another fall in February 2022 while walking her dog; she was found on the side walk. After, she has been working with PT/OT to work on her strength and stability. She may have fallen a couple of times after.      Active Hospital Problems    Diagnosis Date Noted    [facility] [S72.002A] 01/16/2023     Priority: Medium    Other fracture of left femur, initial encounter for closed fracture [facility]) [S72.8X2A] 01/12/2023     Priority: Medium     PostOp Fever  Possible Pneumonia  - fever up to 102.3 F.  - new leukocytosis with WBC 13.3K with left shift on 1/17/23.  - Procalcitonin 0.41, from 0.19 on 1/13/23.  - confusion.  - UA 1/17 showed trace protein, no nitrite, no leuk est, WBC not seen. - blood cultures 1/17 negative to date. - CXR 1/17 showed bilateral pleural-parenchymal opacities suggesting moderate large pleural effusions and airspace disease, right greater than left. Pulmonary edema suspected. Pneumonia not excluded. No pneumothorax. - Started IV vancomycin, cefepime (1/17-). - MRSA nasal swab. D/c vancomycin of MRSA screen negative   1/20: Continue cefepime through 1/23. White blood count has normalized. Continue to monitor. 1/22: Continuing cefepime through tomorrow and then we can discontinue antibiotics. Encephalopathy, Post-op Delirium, Fevers, Possible Pneumonia  - became confused on 1/14 and pulled out IV's. - had to place in soft restraints for safety. - in the setting of post-op and elderly age. Adequate hydration and pain control.  - placed in restraints on 1/14-1/15,  - mental clear and oriented x 3 on 1/15. She was understanding about being in restraints so that she would not inadvertently pull on her IV lines. - off restraints on 1/16. New Onset Atrial Fibrillation with RVR on 1//14  - RVR with HR to 160's and became hypotensive to SBP 70's in the setting of severe AS  - Received 1L NS bolus. - Received amiodarone bolus 150 mg (1/14), then started infusion of 1 mg/min for 6 hours, followed by 0.5 mg/min. -- she converted back NSR after amiodarone bolus and start of 1mg/min infusion. -- consider anticoagulation in the setting valvular disease, however, she is 79 yo. The onset of atrial fibrillation, converted to NSR < 48 hours. - Monitor and discuss with family.   - start ASA 81 mg daily.  Still contemplating AC.  - Cardiology evaluated patient and started on amiodarone 200 mg daily. 1/20: I doubt this patient would be a candidate for anticoagulation with blood thinners due to her fall history. If any antiplatelet therapy was absolutely necessary, I would err on that side as opposed to anticoagulation. Perhaps a baby aspirin upon discharge. Postoperative anemia/acute  1/20: Hemoglobin has trended down fairly quickly from I half to 1 g daily. Hemoglobin 7.2 on today. Ordered a repeat hemoglobin this afternoon and I am awaiting results. Will transfuse if less than 7.0. This has been discussed with both the patient and her son. It does not seem to be any obvious source of bleeding. Seems to be clearly postoperative. No evidence of blood pooling around the wound. Monitor closely. 1/21: Repeat hemoglobin on yesterday afternoon was much improved. Continue to monitor hemoglobin routinely. No need for transfusion at this time. Acute Hypoxic Respiratory Failure  Bilateral Pleural Effusion  ? Pulmonary Edema vs Pneumonia  - in the setting right pleural effusion and possible pneumonia. - CT Chest 1/12 showed small right pleural effusion, trace left pleural effusion. Mild basilar atelectasis. - CXR (portable) 1/17 showed moderate to large pleural effusions and airspace disease in right > left lungs, favoring pulmonary edema. Pneumonia not excluded. - diuresis as below in Edema section. On 3 L O2   May order CT chest if no improvement       Edema  - in the setting of immobility and severe AS, receiving low volume IVF's for borderline BP.  - start IV lasix (1/18-) with low rate IVF's to achieve diuresis in the setting of borderline BP and to protect her kidney.     Severe Aortic Stenosis, Symptomatic  Borderline BP  LV with Grade II Diastolic Dysfunction  Mild to Moderate Mitral Regurgitation  Mild to Moderate Mitral Stenosis  - ECHO 1/12/23 showed normal LV cavity size, wall thickness, LVEF 60-65%, no regional wall motion abnormalities. Grade II diastolic dysfunction. Mitral valve leaflets are thickened/calcified. MAC. Mild to moderate MR and MS. Severe aortic stenosis with peak velocity of 5.3 m/s, mean pressure gradient of 66 mmHg. Mild TR. PSAP 48 mmHg. - Reports lightheadedness on rising. When she fell on 1/12 -- when she walked to the bathroom she was not initially lightheaded; after reaching the bathroom she felt lightheaded. She denies shortness of breath, chest pain, palpitations on exertion. - cardiology consulted for consideration of TAVR. Patient extremely functional; walks 1 mile daily without assistive device. - Cardiology evaluated patient. Recommended outpatient evaluation for TAVR. Mildly Displaced Comminuted Intra-trochanteric Fracture of Left Femur  - CT A/P with contrast 1/12/23 showed mildly displaced comminuted intratrochanteric fracture of the left femur. Surrounding stranding/hematoma in the left thigh as detailed above. - s/p ORIF of left femoral fracture with CMN on 1/12/23  - monitor for post-op complications. - Pain control -- sensitive to narcotics. - Bowel regimen. Add lidocaine patch      Recurrent Falls  Lightheadedness  - walks 1 mile daily without assistive device. - Had a fall in February 2022 while walking her dog.  - she may have fallen a couple more times after, then today 1/12. -- prior to falling today -- she felt lightheaded, turned and lost her balance resulting in her fall. -- adm labs showed elevated lactic acid and ECHO showed severe aortic stenosis. - CT of Head wo contrast 1/12 showed no acute intracranial process. - CT Cervical Spine wo contrast 1/12 showed no acute traumatic abnormality of the cervical spine. Stable multilevel degenerative disc disease and facet arthropathy.   Stable slight rotatory subluxation of C1 on C2 on the right side.  - B12 636, folate >20, vitamin D5OH 22.8 on 1/13/23.  - Started vitamin D3 2000 units daily. Elevated Lactic Acid  - adm lactic acid 3.0.  - Administered 2 L LR, then started LR infusion 75 cc/hr. - lactic acid normalized after receiving IVF's. Small Right Pleural Effusion  Trace Left Pleural Effusion  - in the setting of valvular disease and LV with grade II diastolic dysfunction,    Urinary Retention  - bladder scan showed > 500 cc urine.    - marrero placed on 1/12. Rt Upper Extremity Acute Totally Occluding Superficial Venous Thrombosis of Cephalic Vein From Wrist to Mid Forearm in the setting of IV lines, patient also pulled out IV's. - on Venous US of UE on 1/17.  - warm compresses. - distal superficial thrombus. - supportive care. Hypothyroidism  - TSH 1.15, free T4 1.1 on 1/13/23.  - Continue home levothyroxine 112 mcg daily.  - TSH, free T4 (pharmacy verified that she has not filled her prescription in several months.)  - Recheck TFT in 1 month. Remote Compression Deformities at T12, L1  New Compression Deformity at T11  OA  - vitamin D25OH level 22.8 on 1/13/23.  - start vitamin D3 2000 units daily. History of Rt Hip Fracture   - s/p surgery 2/2010     History of bilateral breast cancer  - s/p left mastectomy 1975  - s/p right lumpectomy and radiation in 1999     DVT 2009   - thought possible related to [place]  - DVT ppx. Moderate Hiatal Hernia  GERD  History of Esophageal spasm  History of Schatzki's ring  - S/p EGD with dilation 1/2011  - Continue home omeprazole 40 mg daily. History of Hepatitis B       DVT Prophylaxis:   Diet: ADULT ORAL NUTRITION SUPPLEMENT; Breakfast, Lunch, Dinner; Standard High Calorie/High Protein Oral Supplement  ADULT DIET;  Regular; Low Fat/Low Chol/High Fiber/2 gm Na  Code Status: Full Code    PT/OT Eval Status: PT/OT    Dispo - once bed available    Buddy Pritchard MD

## 2023-01-22 NOTE — PROGRESS NOTES
Pt complained of SOB during hand over, on assessing her oxygen saturation it was 89%, her rate was increased to 3L, but that didn't help and so the respiratory was paged,she felt better after the therapy.

## 2023-01-23 VITALS
TEMPERATURE: 98.1 F | BODY MASS INDEX: 31.55 KG/M2 | RESPIRATION RATE: 18 BRPM | OXYGEN SATURATION: 93 % | HEIGHT: 61 IN | SYSTOLIC BLOOD PRESSURE: 115 MMHG | HEART RATE: 78 BPM | DIASTOLIC BLOOD PRESSURE: 68 MMHG | WEIGHT: 167.13 LBS

## 2023-01-23 LAB
ANION GAP SERPL CALCULATED.3IONS-SCNC: 10 MMOL/L (ref 3–16)
BANDED NEUTROPHILS RELATIVE PERCENT: 2 % (ref 0–7)
BASOPHILS ABSOLUTE: 0 K/UL (ref 0–0.2)
BASOPHILS RELATIVE PERCENT: 0 %
BUN BLDV-MCNC: 15 MG/DL (ref 7–20)
CALCIUM SERPL-MCNC: 9.4 MG/DL (ref 8.3–10.6)
CHLORIDE BLD-SCNC: 103 MMOL/L (ref 99–110)
CO2: 26 MMOL/L (ref 21–32)
CREAT SERPL-MCNC: 0.7 MG/DL (ref 0.6–1.2)
EOSINOPHILS ABSOLUTE: 0 K/UL (ref 0–0.6)
EOSINOPHILS RELATIVE PERCENT: 0 %
GFR SERPL CREATININE-BSD FRML MDRD: >60 ML/MIN/{1.73_M2}
GLUCOSE BLD-MCNC: 104 MG/DL (ref 70–99)
HCT VFR BLD CALC: 27.3 % (ref 36–48)
HEMOGLOBIN: 9.2 G/DL (ref 12–16)
LYMPHOCYTES ABSOLUTE: 0.4 K/UL (ref 1–5.1)
LYMPHOCYTES RELATIVE PERCENT: 6 %
MACROCYTES: ABNORMAL
MCH RBC QN AUTO: 31.6 PG (ref 26–34)
MCHC RBC AUTO-ENTMCNC: 33.6 G/DL (ref 31–36)
MCV RBC AUTO: 94 FL (ref 80–100)
METAMYELOCYTES RELATIVE PERCENT: 3 %
MICROCYTES: ABNORMAL
MONOCYTES ABSOLUTE: 0.5 K/UL (ref 0–1.3)
MONOCYTES RELATIVE PERCENT: 7 %
MYELOCYTE PERCENT: 4 %
NEUTROPHILS ABSOLUTE: 6.4 K/UL (ref 1.7–7.7)
NEUTROPHILS RELATIVE PERCENT: 78 %
PDW BLD-RTO: 15.3 % (ref 12.4–15.4)
PLATELET # BLD: 194 K/UL (ref 135–450)
PMV BLD AUTO: 7.5 FL (ref 5–10.5)
POLYCHROMASIA: ABNORMAL
POTASSIUM REFLEX MAGNESIUM: 4.1 MMOL/L (ref 3.5–5.1)
RBC # BLD: 2.91 M/UL (ref 4–5.2)
SARS-COV-2, NAAT: NOT DETECTED
SODIUM BLD-SCNC: 139 MMOL/L (ref 136–145)
WBC # BLD: 7.3 K/UL (ref 4–11)

## 2023-01-23 PROCEDURE — 87635 SARS-COV-2 COVID-19 AMP PRB: CPT

## 2023-01-23 PROCEDURE — 80048 BASIC METABOLIC PNL TOTAL CA: CPT

## 2023-01-23 PROCEDURE — 36415 COLL VENOUS BLD VENIPUNCTURE: CPT

## 2023-01-23 PROCEDURE — 6370000000 HC RX 637 (ALT 250 FOR IP): Performed by: STUDENT IN AN ORGANIZED HEALTH CARE EDUCATION/TRAINING PROGRAM

## 2023-01-23 PROCEDURE — 6360000002 HC RX W HCPCS: Performed by: INTERNAL MEDICINE

## 2023-01-23 PROCEDURE — 94761 N-INVAS EAR/PLS OXIMETRY MLT: CPT

## 2023-01-23 PROCEDURE — 6370000000 HC RX 637 (ALT 250 FOR IP): Performed by: INTERNAL MEDICINE

## 2023-01-23 PROCEDURE — 85025 COMPLETE CBC W/AUTO DIFF WBC: CPT

## 2023-01-23 PROCEDURE — 2580000003 HC RX 258: Performed by: INTERNAL MEDICINE

## 2023-01-23 PROCEDURE — 2580000003 HC RX 258: Performed by: STUDENT IN AN ORGANIZED HEALTH CARE EDUCATION/TRAINING PROGRAM

## 2023-01-23 PROCEDURE — 2700000000 HC OXYGEN THERAPY PER DAY

## 2023-01-23 RX ORDER — AMIODARONE HYDROCHLORIDE 200 MG/1
200 TABLET ORAL DAILY
DISCHARGE
Start: 2023-01-24

## 2023-01-23 RX ORDER — ASPIRIN 81 MG/1
81 TABLET ORAL DAILY
Qty: 30 TABLET | Refills: 3 | DISCHARGE
Start: 2023-01-24

## 2023-01-23 RX ORDER — GABAPENTIN 100 MG/1
100 CAPSULE ORAL 3 TIMES DAILY
Qty: 90 CAPSULE | Refills: 3 | DISCHARGE
Start: 2023-01-23 | End: 2023-05-23

## 2023-01-23 RX ORDER — OXYCODONE HYDROCHLORIDE 5 MG/1
5 TABLET ORAL EVERY 6 HOURS PRN
Refills: 0 | Status: SHIPPED | OUTPATIENT
Start: 2023-01-23 | End: 2023-01-26

## 2023-01-23 RX ORDER — OLANZAPINE 5 MG/1
5 TABLET ORAL NIGHTLY
Qty: 30 TABLET | Refills: 3 | DISCHARGE
Start: 2023-01-23

## 2023-01-23 RX ORDER — LIDOCAINE 4 G/G
2 PATCH TOPICAL DAILY
DISCHARGE
Start: 2023-01-24

## 2023-01-23 RX ORDER — CEFDINIR 300 MG/1
300 CAPSULE ORAL 2 TIMES DAILY
Qty: 2 CAPSULE | Refills: 0 | DISCHARGE
Start: 2023-01-23 | End: 2023-01-24

## 2023-01-23 RX ORDER — ESZOPICLONE 3 MG/1
3 TABLET, FILM COATED ORAL NIGHTLY PRN
Qty: 30 TABLET | Refills: 0 | Status: SHIPPED | OUTPATIENT
Start: 2023-01-23 | End: 2023-02-02

## 2023-01-23 RX ORDER — LORAZEPAM 1 MG/1
1 TABLET ORAL NIGHTLY PRN
Status: SHIPPED | OUTPATIENT
Start: 2023-01-23 | End: 2023-02-22

## 2023-01-23 RX ADMIN — OXYCODONE HYDROCHLORIDE 10 MG: 5 TABLET ORAL at 17:19

## 2023-01-23 RX ADMIN — CEFEPIME HYDROCHLORIDE 2000 MG: 2 INJECTION, POWDER, FOR SOLUTION INTRAMUSCULAR; INTRAVENOUS at 13:40

## 2023-01-23 RX ADMIN — PANTOPRAZOLE SODIUM 40 MG: 40 TABLET, DELAYED RELEASE ORAL at 06:33

## 2023-01-23 RX ADMIN — SODIUM CHLORIDE, PRESERVATIVE FREE 10 ML: 5 INJECTION INTRAVENOUS at 10:15

## 2023-01-23 RX ADMIN — SODIUM CHLORIDE: 9 INJECTION, SOLUTION INTRAVENOUS at 00:01

## 2023-01-23 RX ADMIN — CEFEPIME HYDROCHLORIDE 2000 MG: 2 INJECTION, POWDER, FOR SOLUTION INTRAMUSCULAR; INTRAVENOUS at 00:05

## 2023-01-23 RX ADMIN — GABAPENTIN 100 MG: 100 CAPSULE ORAL at 10:05

## 2023-01-23 RX ADMIN — AMIODARONE HYDROCHLORIDE 200 MG: 200 TABLET ORAL at 10:04

## 2023-01-23 RX ADMIN — VANCOMYCIN HYDROCHLORIDE 1000 MG: 10 INJECTION, POWDER, LYOPHILIZED, FOR SOLUTION INTRAVENOUS at 17:21

## 2023-01-23 RX ADMIN — SODIUM CHLORIDE, PRESERVATIVE FREE 10 ML: 5 INJECTION INTRAVENOUS at 07:06

## 2023-01-23 RX ADMIN — SODIUM CHLORIDE, PRESERVATIVE FREE 20 ML: 5 INJECTION INTRAVENOUS at 14:54

## 2023-01-23 RX ADMIN — LEVOTHYROXINE SODIUM 112 MCG: 0.11 TABLET ORAL at 06:33

## 2023-01-23 RX ADMIN — OXYCODONE HYDROCHLORIDE 10 MG: 5 TABLET ORAL at 10:05

## 2023-01-23 RX ADMIN — SODIUM CHLORIDE, PRESERVATIVE FREE 10 ML: 5 INJECTION INTRAVENOUS at 00:02

## 2023-01-23 RX ADMIN — HEPARIN SODIUM 5000 UNITS: 5000 INJECTION INTRAVENOUS; SUBCUTANEOUS at 14:53

## 2023-01-23 RX ADMIN — GABAPENTIN 100 MG: 100 CAPSULE ORAL at 14:53

## 2023-01-23 RX ADMIN — HEPARIN SODIUM 5000 UNITS: 5000 INJECTION INTRAVENOUS; SUBCUTANEOUS at 06:38

## 2023-01-23 RX ADMIN — ASPIRIN 81 MG: 81 TABLET, COATED ORAL at 10:05

## 2023-01-23 ASSESSMENT — PAIN DESCRIPTION - ORIENTATION
ORIENTATION: LEFT
ORIENTATION: LEFT

## 2023-01-23 ASSESSMENT — PAIN SCALES - GENERAL
PAINLEVEL_OUTOF10: 0
PAINLEVEL_OUTOF10: 10
PAINLEVEL_OUTOF10: 10

## 2023-01-23 ASSESSMENT — PAIN DESCRIPTION - LOCATION
LOCATION: LEG
LOCATION: LEG

## 2023-01-23 ASSESSMENT — PAIN DESCRIPTION - ONSET: ONSET: ON-GOING

## 2023-01-23 ASSESSMENT — PAIN DESCRIPTION - PAIN TYPE: TYPE: ACUTE PAIN

## 2023-01-23 ASSESSMENT — PAIN DESCRIPTION - DESCRIPTORS
DESCRIPTORS: THROBBING;SHOOTING
DESCRIPTORS: THROBBING;STABBING

## 2023-01-23 ASSESSMENT — PAIN DESCRIPTION - FREQUENCY: FREQUENCY: INTERMITTENT

## 2023-01-23 ASSESSMENT — PAIN - FUNCTIONAL ASSESSMENT
PAIN_FUNCTIONAL_ASSESSMENT: PREVENTS OR INTERFERES WITH ALL ACTIVE AND SOME PASSIVE ACTIVITIES
PAIN_FUNCTIONAL_ASSESSMENT: PREVENTS OR INTERFERES WITH MANY ACTIVE NOT PASSIVE ACTIVITIES

## 2023-01-23 NOTE — CARE COORDINATION
ADDENDUM:     TREV spoke to Fabio, they are in agreement w/DC to [facility] this evening at 7pm.    Electronically signed by RAHEEM Ervin LSW on 1/23/2023 at 3:55 PM        TREV spoke to Fabio, she is going to check with pt's son and then call SW back but she thinks they will be okay with a semi private room. Electronically signed by RAHEEM Ervin LSW on 1/23/2023 at 3:49 PM      TREV spoke to admissions at [facility] they can accept pt today in a semi private room after 6pm    SW scheduled transport  via [facility] at [id number] [facility].  Electronically signed by RAHEEM Ervin LSW on 1/23/2023 at 3:49 PM          TREV met w/Pt and pt's daughter Fabio, they still want [facility] but want to look into back up options. They requested referrals to [facility] and [facility]. TREV sent referrals. TREV spoke to Adriana at [facility], she will review  and call back, they have private rooms. TREV spoke to Ja patel at [facility], she will review and call SW back, they also should have a private room for pt. Electronically signed by RAHEEM Ervin LSW on 1/23/2023 at 3:20 PM      TREV LVM for [facility] admissions. Electronically signed by RAHEEM Ervin LSW on 1/23/2023 at 2:55 PM        TREV provided updated to Pt and her son during rounds. SW discussed other SNF options if [facility] doesn't have a bed. Pt's family prefers [facility] and wants a private room. They will discuss other options if courtyard denies. Electronically signed by RAHEEM Ervin LSW on 1/23/2023 at 12:34 PM      TREV spoke to [facility] at [facility], she stated she is going into the admissions meeting now and will need to call TREV back regarding her as there are 2 others trying to admit. TREV LVM for admissions at [facility].     Electronically signed by RAHEEM Ervin LSW on 1/23/2023 at 10:19 AM  [phone]

## 2023-01-23 NOTE — PROGRESS NOTES
Clinical Pharmacy Progress Note    Vancomycin - Management by Pharmacy    Consult Date(s): 1/17/23  Consulting Provider(s): Dr. Sam Pierce / Plan  1)  Post-op fevers - Vancomycin  Concurrent Antimicrobials: Cefepime (Day 6/7)  Day of Vanc Therapy: Day # 6  Current Dosing Method: Bayesian-Guided AUC Dosing  Therapeutic Goal: -600 mg/L*hr  Current Dose / Plan:   SCr was not drawn again this morning. Patient's renal function was stable at 0.8 mg/dL yesterday. Over the last 24 hours, UOP ~1.1 ml/kg/hr. Patient's current regimen is 1000mg q24h   Estimated AUC is now 463 mg/L.hr with trough 14.1 mg/L. Will continue current regimen for now. Repeat level may not be needed if pt is clinically improving, renal function remains stable, and short course of therapy is planned. Will monitor and order level if it becomes appropriate. Will continue to monitor clinical condition and make adjustments to regimen as appropriate. Please call with any questions. Chloé Steven, VadimD, BCPS  Wireless: [id number]  Main pharmacy: [id number]  1/23/2023 7:27 AM      Interval update:  Patient remains afebrile and hemodynamically stable. Working on SNF placement. Plan is to stop abx today. Subjective/Objective:   Frantz Dyer is a 80 y.o. female with a PMHx significant for HLD, OA, DVT, hypothyroidism, spinal stenosis, and b/l breast cancer who is admitted with left femur fracture. Pt is s/p left hip intertrochoanteric nailing (done 1/12/23). Post-op course has included treatment for encephalopathy / post-op delirium, new onset A.fib with RVR, and urinary retention. Broad spectrum antibiotics were started 1/17 for post-op fevers.     Pharmacy is consulted to dose Vancomycin    Ht Readings from Last 1 Encounters:   01/20/23 5' 1\" (1.549 m)     Wt Readings from Last 1 Encounters:   01/23/23 167 lb 2 oz (75.8 kg)     Current & Prior Antimicrobial Regimen(s):   (1/12-1/13)  Cefepime 2000mg EI q12h (1/17-current)  Vancomycin - Pharmacy to dose  1250mg IV q24h (1/17-1/18)  1000mg IV q24h (1/18-current)    Vancomycin Level(s) / Doses:    Date Time Dose Type of Level / Level Interpretation   1/19 05:40 1250 mg IV q24h Random = 13.5mcg/mL Drawn ~13.5h after 2nd dose  Calculated AUC = 564 with trough = 17.4  Decrease to 1000mg q24h          Note: Serum levels collected for AUC-based dosing may be high if collected in close proximity to the dose administered. This is not necessarily indicative of toxicity. Cultures & Sensitivities:    Date Site Micro Susceptibility / Result   1/17 Blood x2 No growth to date    1/19 MRSA screening cx Not detected      Recent Labs     01/20/23  0841 01/20/23  1558 01/21/23  1040   CREATININE 0.7  --  0.8   BUN 16  --  16   WBC  --  7.7 4.4         Estimated Creatinine Clearance: 42 mL/min (based on SCr of 0.8 mg/dL). Additional Lab Values / Findings of Note:    No results for input(s): PROCAL in the last 72 hours.

## 2023-01-23 NOTE — DISCHARGE SUMMARY
Hospital Medicine Discharge Summary    Patient ID: Darling Shoulder      Patient's PCP: Ad Bill Date: 1/12/2023     Discharge Date:   01/23/23      Admitting Provider: No admitting provider for patient encounter. Discharge Provider: Wilber Chen MD     Discharge Diagnoses: Active Hospital Problems    Diagnosis     Closed fracture of left hip (HCC) [S72.002A]      Priority: Medium    Other fracture of left femur, initial encounter for closed fracture [facility]) [S72.8X2A]      Priority: Medium       The patient was seen and examined on day of discharge and this discharge summary is in conjunction with any daily progress note from day of discharge. Hospital Course: This is a 79 yo Female, with history of bilateral breast cancer, s/p left mastectomy 1975, right lumpectomy and radiation in 1999, DVT 2009 while on [place], hypothyroidism, esophageal spasm, schatzki's ring, EGD with dilation 1/2011, GERD, hepatitis B, OA, spinal stenosis, Rt Hip Fracture s/p surgery 2/2010, Thoracic and Lumbar compression fractures, who presents for evaluation after fall. She was found to have sustained mildly displaced comminuted intratrochanteric fracture of the left femur. Patient is alert, oriented, and coherent. Daughter in-law, Brock Ibarra, is present and assists with clinical history. This morning patient walked from her bedroom to the bathroom. When she reached the bathroom, she felt lightheaded. States that she must have turned around, lost her balance, and fell onto her left side. After falling she was in significant pain and was unable to rise on her own. Patient has a  who comes every morning to walk her dog at ~ 730 AM.    Knowing that her  was coming, patient screamed out for help. Reportedly she was found likely on her left side on the bathroom floor. Her  called EMS and patient was taken to [facility], [facility]. for evaluation.      Patient has been in her usual state of health. She walks about 1 mile daily without assistance. She used to walk longer distances. Patient denies recent fevers, chills, cough, shortness of breath, chest pain, palpitations at rest and on exertion. She denies history of nausea, vomiting, abdominal pain, dysuria, diarrhea. She eats three meals daily. States that she does not eat much during lunchtime. Daughter in-law, Sukh Marquez said patient had another fall in February 2022 while walking her dog; she was found on the side walk. After, she has been working with PT/OT to work on her strenght and stability. She may have fallen a couple of times after.    ______________________________________________    Pt admitted for left femur fracture and tolerated corrective surgery. Pt did develop a post op fever with concern for possible pneumonia. She was provided abx therapy and improved with the regimen. She will complete abx on 1/23. If she leaves prior to that date, her cefepime will be converted to cefdinir. She had transient encephalopathy postoperatively as well. This improved with redirection and did not seem out of range of expectations due to her age. Additionally, she had evidence of new onset afib with rvr on 1/14/23 and was started on amiodarone with bolus and infusion and converted to nsr. She was changed to oral dosing and a baby asa. She is not a candidate for anticoagulation due to her recent history of falls and age. She had a transient deal with postop anemia that improved without the need for a transfusion. She was also noted to have acute resp failure which was associated with pleural effusions, severe aortic stenosis and diastolic dysfunction. Cardiology was consulted for TAVR consideration, which they will evaluate her for as an outpatient after ortho procedure recovery. She has improved from a respiratory standpoint. Her other chronic issues were maintained.      Physical Exam Performed: /68   Pulse 80   Temp 98 °F (36.7 °C) (Oral)   Resp 20   Ht 5' 1\" (1.549 m)   Wt 167 lb 2 oz (75.8 kg)   SpO2 94%   BMI 31.58 kg/m²       General appearance:  No apparent distress, appears stated age and cooperative. HEENT:  Normal cephalic, atraumatic without obvious deformity. Pupils equal, round, and reactive to light. Extra ocular muscles intact. Conjunctivae/corneas clear. Neck: Supple, with full range of motion. No jugular venous distention. Trachea midline. Respiratory:  Normal respiratory effort. Clear to auscultation, bilaterally without Rales/Wheezes/Rhonchi. Cardiovascular:  Regular rate and rhythm with normal A9/P6 systolic murmurs over RUSB, LUSB, left>right upper extremity edema, left thigh edema and bilateral leg edema, radial pulse 2+, DP pulse 2+. Abdomen: Soft, non-tender, non-distended with normal bowel sounds. Musculoskeletal: left hip tenderness, edema, no cyanosis, no clubbing  Skin: Skin color, texture, turgor normal.  No rashes or lesions. Neurologic:  Neurovascularly intact without any focal sensory/motor deficits. Cranial nerves: II-XII intact, grossly non-focal.  Psychiatric:  Alert and oriented, thought content appropriate, normal insight  Capillary Refill: Brisk,< 3 seconds   Peripheral Pulses: +2 palpable, equal bilaterally       Labs:  For convenience and continuity at follow-up the following most recent labs are provided:      CBC:    Lab Results   Component Value Date/Time    WBC 7.3 01/23/2023 08:28 AM    HGB 9.2 01/23/2023 08:28 AM    HCT 27.3 01/23/2023 08:28 AM     01/23/2023 08:28 AM       Renal:    Lab Results   Component Value Date/Time     01/23/2023 08:28 AM    K 4.1 01/23/2023 08:28 AM     01/23/2023 08:28 AM    CO2 26 01/23/2023 08:28 AM    BUN 15 01/23/2023 08:28 AM    CREATININE 0.7 01/23/2023 08:28 AM    CALCIUM 9.4 01/23/2023 08:28 AM    PHOS 3.4 01/20/2023 08:41 AM         Significant Diagnostic Studies    Radiology:   VL Extremity Venous Bilateral   Final Result      XR CHEST PORTABLE   Final Result      Moderate to large pleural effusions and airspace disease in the right greater than left lungs. Pulmonary edema favored. Pneumonia not excluded. XR CHEST PORTABLE   Final Result   Stable coarse lung markings in the right for which I cannot exclude some element of continued pneumonitis. Some of this is atelectatic in nature. CT HEAD WO CONTRAST   Final Result      Chronic ischemic changes of the brain and atrophy with no midline shift or hydrocephalus or definitive hemorrhage. Extensive artifact noted likely from patient motion            XR TIBIA FIBULA LEFT (2 VIEWS)   Final Result      Normal radiographs of the left tibia and fibula. Knee compartment narrowing appreciated. Left ankle 3 view:      HISTORY: Trauma:      FINDINGS:      AP lateral oblique views demonstrate normal alignment. There is no acute fracture or dislocation      IMPRESSION:      No acute fracture       LEFT HIP 2 VIEW , AP view pelvis      HISTORY: Debbie Serge with hip pain, previous left hip pinning      PROCEDURE: AP view the pelvis and lateral view of left hip were obtained      COMPARISON: Intraoperative studies from January 12, 2023      FINDINGS:      There is normal alignment of the right hip with 4 screws noted traversing the trochanter femoral neck and head from previous fracture fixation without significant displacement, unchanged. Left trochanteric hip fixation is intact with transfixation screws through the trochanter femoral neck and long intramedullary amaris remaining in stable position compared to recent study. No discrete acute fracture or dislocation. No expansile cystic or destructive change identified      AP view of the pelvis demonstrates no acute bony defect. Diffuse demineralization noted. Degenerative changes lower lumbar spine.     The sacroiliac margins appear normal.    There is some minimal sclerosis along the acetabulum      IMPRESSION:      No acute deformity involving the right or left hip. Previous pinning of the right hip is unchanged. Previous fracture fixation of the left trochanteric and hip is intact      No acute bony defect in the pelvis. Diffuse demineralization            XR SHOULDER LEFT (MIN 2 VIEWS)   Final Result      Chronic advanced arthropathy of the left shoulder with loose bodies and subarticular erosions affecting the humeral head and advanced glenohumeral arthritic change and remodeling         XR ANKLE LEFT (2 VIEWS)   Final Result      Normal radiographs of the left tibia and fibula. Knee compartment narrowing appreciated. Left ankle 3 view:      HISTORY: Trauma:      FINDINGS:      AP lateral oblique views demonstrate normal alignment. There is no acute fracture or dislocation      IMPRESSION:      No acute fracture       LEFT HIP 2 VIEW , AP view pelvis      HISTORY: Goran Pear with hip pain, previous left hip pinning      PROCEDURE: AP view the pelvis and lateral view of left hip were obtained      COMPARISON: Intraoperative studies from January 12, 2023      FINDINGS:      There is normal alignment of the right hip with 4 screws noted traversing the trochanter femoral neck and head from previous fracture fixation without significant displacement, unchanged. Left trochanteric hip fixation is intact with transfixation screws through the trochanter femoral neck and long intramedullary amaris remaining in stable position compared to recent study. No discrete acute fracture or dislocation. No expansile cystic or destructive change identified      AP view of the pelvis demonstrates no acute bony defect. Diffuse demineralization noted. Degenerative changes lower lumbar spine. The sacroiliac margins appear normal.    There is some minimal sclerosis along the acetabulum      IMPRESSION:      No acute deformity involving the right or left hip.  Previous pinning of the right hip is unchanged. Previous fracture fixation of the left trochanteric and hip is intact      No acute bony defect in the pelvis. Diffuse demineralization            XR KNEE RIGHT (1-2 VIEWS)   Final Result      1. Right knee: Advanced arthritic change as described above. No sign of any fracture or acute deformity. 2. Left knee: Moderate degenerative change particularly involving the lateral compartment with some prepatellar soft tissue swelling. No acute defect      XR HIP 2-3 VW W PELVIS LEFT   Final Result      Normal radiographs of the left tibia and fibula. Knee compartment narrowing appreciated. Left ankle 3 view:      HISTORY: Trauma:      FINDINGS:      AP lateral oblique views demonstrate normal alignment. There is no acute fracture or dislocation      IMPRESSION:      No acute fracture       LEFT HIP 2 VIEW , AP view pelvis      HISTORY: Misael Kylee with hip pain, previous left hip pinning      PROCEDURE: AP view the pelvis and lateral view of left hip were obtained      COMPARISON: Intraoperative studies from January 12, 2023      FINDINGS:      There is normal alignment of the right hip with 4 screws noted traversing the trochanter femoral neck and head from previous fracture fixation without significant displacement, unchanged. Left trochanteric hip fixation is intact with transfixation screws through the trochanter femoral neck and long intramedullary amaris remaining in stable position compared to recent study. No discrete acute fracture or dislocation. No expansile cystic or destructive change identified      AP view of the pelvis demonstrates no acute bony defect. Diffuse demineralization noted. Degenerative changes lower lumbar spine. The sacroiliac margins appear normal.    There is some minimal sclerosis along the acetabulum      IMPRESSION:      No acute deformity involving the right or left hip. Previous pinning of the right hip is unchanged.       Previous fracture fixation of the left trochanteric and hip is intact      No acute bony defect in the pelvis. Diffuse demineralization            XR KNEE LEFT (1-2 VIEWS)   Final Result      1. Right knee: Advanced arthritic change as described above. No sign of any fracture or acute deformity. 2. Left knee: Moderate degenerative change particularly involving the lateral compartment with some prepatellar soft tissue swelling. No acute defect      XR HIP LEFT (2-3 VIEWS)   Final Result      Intraoperative fluoroscopy for open reduction internal fixation of the left hip. Refer to the operative note for details. FLUORO FOR SURGICAL PROCEDURES   Final Result      Intraoperative fluoroscopy for open reduction internal fixation of the left hip. Refer to the operative note for details. CT CHEST ABDOMEN PELVIS W CONTRAST Additional Contrast? None   Final Result      CHEST:   1.  Small right pleural effusion and trace left pleural effusion. Mild bibasilar atelectasis. 2.  Stable remote compression deformities at T12 and L1 compared to prior MRI from 2015. There is also new compression deformity at T11 compared to the study, but this is age indeterminant and may also be remote. Recommend correlation with point    tenderness in this level. 3.  Motion limited exam without definite evidence of acute traumatic intrathoracic abnormality. 4.  Moderate hiatal hernia. ABDOMEN AND PELVIS:   1.  Mildly displaced comminuted intratrochanteric fracture of the left femur. 2.  Surrounding stranding/hematoma in the left thigh as detailed above. 3.  No additional acute traumatic abnormality identified in the abdomen and pelvis per         CT CERVICAL SPINE WO CONTRAST   Final Result   1. No acute traumatic abnormality of the cervical spine. 2. Stable multilevel degenerative disc disease and facet arthropathy. 3. Stable slight rotatory subluxation of C1 on C2 on the right side. CT HEAD WO CONTRAST   Final Result      1.  Stable exam with no acute intracranial abnormality. XR FEMUR LEFT (MIN 2 VIEWS)   Final Result   Addendum (preliminary) 1 of 1   [** ADDENDUM #1 **   Addendum: Initial report by Dr. Bessy Muñoz. Addendum by Dr. Karina Carter. Frontal view of the pelvis and frontal and frog-leg lateral views of the    left femur were obtained. There is a nondisplaced intertrochanteric    fracture of the left femur. No dislocation. Orthopedic hardware of the    proximal right femur is noted. No    additional fracture deformity. Final   Nondisplaced intertrochanteric fracture on the left. IMPRESSION:      No pneumothorax. Diffusely prominent interstitial markings presumably representing chronic interstitial lung disease. No prior for comparison. Normal cardiomediastinal silhouette. Presumed old fracture deformity of the proximal left humerus. If there is left shoulder pain then dedicated radiographs are indicated. XR PELVIS (1-2 VIEWS)   Final Result   Addendum (preliminary) 1 of 1   [** ADDENDUM #1 **   Addendum: Initial report by Dr. Bessy Muñoz. Addendum by Dr. Karina Carter. Frontal view of the pelvis and frontal and frog-leg lateral views of the    left femur were obtained. There is a nondisplaced intertrochanteric    fracture of the left femur. No dislocation. Orthopedic hardware of the    proximal right femur is noted. No    additional fracture deformity. Final   Nondisplaced intertrochanteric fracture on the left. IMPRESSION:      No pneumothorax. Diffusely prominent interstitial markings presumably representing chronic interstitial lung disease. No prior for comparison. Normal cardiomediastinal silhouette. Presumed old fracture deformity of the proximal left humerus. If there is left shoulder pain then dedicated radiographs are indicated. XR CHEST PORTABLE   Final Result   Addendum (preliminary) 1 of 1   [** ADDENDUM #1 **   Addendum: Initial report by Dr. Bessy Muñoz.  Addendum by Dr. Gui Garrett. Frontal view of the pelvis and frontal and frog-leg lateral views of the    left femur were obtained. There is a nondisplaced intertrochanteric    fracture of the left femur. No dislocation. Orthopedic hardware of the    proximal right femur is noted. No    additional fracture deformity. Final   Nondisplaced intertrochanteric fracture on the left. IMPRESSION:      No pneumothorax. Diffusely prominent interstitial markings presumably representing chronic interstitial lung disease. No prior for comparison. Normal cardiomediastinal silhouette. Presumed old fracture deformity of the proximal left humerus. If there is left shoulder pain then dedicated radiographs are indicated. Consults:     IP CONSULT TO HOSPITALIST  IP CONSULT TO CARDIOLOGY  IP CONSULT TO UROLOGY  IP CONSULT TO PHYSICAL MEDICINE REHAB  IP CONSULT TO CARDIOLOGY  PHARMACY TO DOSE VANCOMYCIN    Disposition:  to snf upon discharge     Condition at Discharge: Stable    Discharge Instructions/Follow-up:  follow up with ortho and cardiology after snf stay    Code Status:  Full Code     Activity: activity as tolerated    Diet: cardiac diet      Discharge Medications:     Current Discharge Medication List             Details   aspirin 81 MG EC tablet Take 1 tablet by mouth daily  Qty: 30 tablet, Refills: 3      amiodarone (CORDARONE) 200 MG tablet Take 1 tablet by mouth daily      OLANZapine (ZYPREXA) 5 MG tablet Take 1 tablet by mouth nightly  Qty: 30 tablet, Refills: 3      cefdinir (OMNICEF) 300 MG capsule Take 1 capsule by mouth 2 times daily for 1 day  Qty: 2 capsule, Refills: 0      lidocaine 4 % external patch Place 2 patches onto the skin daily                Details   oxyCODONE (ROXICODONE) 5 MG immediate release tablet Take 1 tablet by mouth every 6 hours as needed for Pain for up to 3 days.  Max Daily Amount: 20 mg  Refills: 0    Comments: Reduce doses taken as pain becomes manageable  Associated Diagnoses: Closed fracture of left hip, initial encounter ([facility])      LORazepam (ATIVAN) 1 MG tablet Take 1 tablet by mouth nightly as needed for Anxiety (insomnia) for up to 30 days. Max Daily Amount: 1 mg    Associated Diagnoses: Anxiety      gabapentin (NEURONTIN) 100 MG capsule Take 1 capsule by mouth 3 times daily for 120 days. Qty: 90 capsule, Refills: 3      eszopiclone (LUNESTA) 3 MG TABS Take 1 tablet by mouth nightly as needed (insomnia) for up to 10 days. For 10 days (1/5/23 - 1/15/23) Max Daily Amount: 3 mg  Qty: 30 tablet, Refills: 0    Associated Diagnoses: Primary insomnia                Details   omeprazole (PRILOSEC) 20 MG delayed release capsule Take 20 mg by mouth Daily      Fesoterodine Fumarate ER 8 MG TB24 Take 1 tablet by mouth daily      levothyroxine (SYNTHROID) 112 MCG tablet Take 112 mcg by mouth Daily      lubiprostone (AMITIZA) 8 MCG CAPS capsule Take 8 mcg by mouth 2 times daily (with meals)      Vibegron (GEMTESA) 75 MG TABS Take 1 tablet by mouth daily      denosumab (PROLIA) 60 MG/ML SOLN SC injection Inject 60 mg into the skin every 6 months      simvastatin (ZOCOR) 20 MG tablet Take 20 mg by mouth nightly             Time Spent on discharge: 50 minutes in the examination, evaluation, counseling and review of medications and discharge plan. Signed:    Baldo Martinez MD   1/23/2023      Thank you Jhonny Camacho for the opportunity to be involved in this patient's care. If you have any questions or concerns, please feel free to contact me at [phone].

## 2023-01-23 NOTE — PROGRESS NOTES
Orthopaedic Surgery Progress Note:    Margarita Machuca is a 80 y.o. female now s/p Left femur IMN 1/12/2023     S:   Pt seen and examined    Patient complains of L hip pain with attempted ambulation           O:   Vitals:    01/23/23 0808   BP: 112/74   Pulse: 80   Resp: 18   Temp: 97.9 °F (36.6 °C)   SpO2: 98%       GEN: Alert, resting in bed, NAD  HEENT: NCAT  CV: normotensive  RESP: non-labored breathing    LLE:   Dressing CDI, no hematoma or drainage   NO TTP knee, tibia, ankle  Intact TA/EHL/GS  SILT L2-S1  Cap Refill< 2 sec             Labs:    Lab Results   Component Value Date    WBC 7.3 01/23/2023    HGB 9.2 (L) 01/23/2023    HCT 27.3 (L) 01/23/2023    MCV 94.0 01/23/2023     01/23/2023       Lab Results   Component Value Date     01/21/2023    K 4.2 01/21/2023    CO2 28 01/21/2023     01/21/2023    BUN 16 01/21/2023    CREATININE 0.8 01/21/2023           Intake/Output Summary (Last 24 hours) at 1/23/2023 4196  Last data filed at 1/23/2023 7682  Gross per 24 hour   Intake 1055 ml   Output 1600 ml   Net -545 ml         A: Margarita Machuca is a 80 y.o. female now s/p Left femur IMN 1/12/2023       P:         Activity:  PT/OT   ROM: AT  Weight Bearing Status: WBAT LLE  Pain: multimodal   Diet: ADAT  marrero: per urology   DVT PPx: OK for chemical DVT PPX   Imaging: none  Follow-up: 2 weeks  Dispo: per primary         Job Dhruv BAEZ  Orthopaedic Spine Surgery  [facility]  O: [phone]  C: [phone]  E: [email or web address]

## 2023-01-23 NOTE — PLAN OF CARE
Problem: Musculoskeletal - Adult  Goal: Return mobility to safest level of function  Outcome: Progressing     Problem: Musculoskeletal - Adult  Goal: Maintain proper alignment of affected body part  Outcome: Progressing     Problem: Safety - Adult  Goal: Free from fall injury  Outcome: Progressing     Problem: Discharge Planning  Goal: Discharge to home or other facility with appropriate resources  Outcome: Progressing     Problem: ABCDS Injury Assessment  Goal: Absence of physical injury  Outcome: Progressing     Problem: Pain  Goal: Verbalizes/displays adequate comfort level or baseline comfort level  Outcome: Progressing

## 2023-01-23 NOTE — CARE COORDINATION
Case Management Assessment            Discharge Note                    Date / Time of Note: 1/23/2023 3:59 PM                  Discharge Note Completed by: RAHEEM Ervin, CRISTÓBALW    Patient Name: Gerri Carson   YOB: 1930  Diagnosis: Other fracture of left femur, initial encounter for closed fracture [facility]) [S72.8X2A]  Closed fracture of left hip, [facility] encounter [facility] Janet Melendez   Date / Time: 1/12/2023  8:42 AM    Current PCP: Troy Troncoso patient: No    Hospitalization in the last 30 days: No    Advance Directives:  Code Status: Full Code  [place] DNR form completed and on chart: No    Financial:  Payor: MEDICARE / Plan: MEDICARE PART A AND B / Product Type: *No Product type* /      Pharmacy:    Robinette Blizzard [address] [place] [address] [place] [phone] - F [phone]  Ifeoma Barber [address] [place] [id number]  Phone: [phone] Fax: [phone]      Assistance purchasing medications?: Potential Assistance Purchasing Medications: No  Assistance provided by Case Management: None at this time    Does patient want to participate in local refill/ meds to beds program?: No    Meds To Beds General Rules:  1. Can ONLY be done Monday- Friday between 8:30am-5pm  2. Prescription(s) must be in pharmacy by 3pm to be filled same day  3. Copy of patient's insurance/ prescription drug card and patient face sheet must be sent along with the prescription(s)  4. Cost of Rx cannot be added to hospital bill. If financial assistance is needed, please contact unit  or ;  or  CANNOT provide pharmacy voucher for patients co-pays  5.  Patients can then  the prescription on their way out of the hospital at discharge, or pharmacy can deliver to the bedside if staff is available. (payment due at time of pick-up or delivery - cash, check, or card accepted)     Able to afford home medications/ co-pay costs: Yes    ADLS:  Current PT AM-PAC Score: 10 /24  Current OT AM-PAC Score: 11 /24      DISCHARGE Disposition: [place] (SNF): [facility] Phone: [phone] Fax: [phone]    LOC at discharge: Skilled  EMELY Completed: Yes    Notification completed in HENS/PAS?:  Yes : CM has completed HENS online through secure website for SNF admission at [address] at seasons. Document ID #: [id number]    IMM Completed:   Not Indicated    Transportation:  Transportation PLAN for discharge: EMS transportation   Mode of Transport: Ambulance stretcher - BLS  Reason for medical transport: Bed confined: Meets the following criteria 1) unable to get out of bed without assistance or ambulate, 2) unable to safely sit up in a wheelchair, 3) unable to maintain erect seating position in a chair for time needed for transport  Name of Transport Company:  [employer]   Phone: [phone]  Time of Transport: 7pm    Transport form completed: Yes    Referrals made at [facility] for outpatient continued care:  Not Applicable    Additional CM Notes:   Pt is from home and will DC [facility]. Pt and Pt's family are in agreement w/DCP, they are okay with waiting for a private room. Haresh Ross is aware of transport time. Sw spoke to admissions and confirmed DC and transport time. SW scheduled transport  via [employer] at [address] : fax:[phone]    The Plan for Transition of Care is related to the following treatment goals of Other fracture of left femur, initial encounter for closed fracture ([facility] [S72.8X2A]  Closed fracture of left hip, initial encounter ([facility] David Sober    The Patient and/or patient representative Mauricio Billings and her family were provided with a choice of provider and agrees with the discharge plan Yes    Freedom of choice list was provided with basic dialogue that supports the patient's individualized plan of care/goals and shares the quality data associated with the providers.  Yes    Care Transitions patient: No    RAHEEM Burr, [facility]  Case Management Department  Ph: [phone]

## 2023-01-23 NOTE — DISCHARGE INSTR - COC
Continuity of Care Form    Patient Name: Driss Lora   :  1930  MRN:  [id number]    Admit date:  2023  Discharge date:  2023    Code Status Order: Full Code   Advance Directives:     Admitting Physician:  No admitting provider for patient encounter.   PCP: Natanael Osborn    Discharging Nurse: [facility] Unit/Room#: 8128/0422-13  Discharging Unit Phone Number: [phone]    Emergency Contact:   Extended Emergency Contact Information  Primary Emergency Contact: [address], [address] [facility] [address] Phone: [phone]  Mobile Phone: [phone]  Relation: Child  Secondary Emergency Contact: Cindy Mauricio  Mobile Phone: [phone]  Relation: Daughter-in-Law    Past Surgical History:  Past Surgical History:   Procedure Laterality Date    BREAST LUMPECTOMY      Right     SECTION      HIP SURGERY Left 2023    LEFT HIP INTERTROCHOANTERIC NAILING performed by Rashaun Skinner MD at [facility].      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    Visit for wound check Z51.89    Vitamin D deficiency E55.9    Osteoporosis, 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    Vertebral fracture, osteoporotic, with routine healing, subsequent encounter M80.08XD    Other fracture of left femur, initial encounter for closed fracture ([address] S72.8X2A    Closed fracture of left hip ([address] S72.002A Isolation/Infection:   Isolation            No Isolation          Patient Infection Status       None to display            Nurse Assessment:  Last Vital Signs: /68   Pulse 80   Temp 98 °F (36.7 °C) (Oral)   Resp 20   Ht 5' 1\" (1.549 m)   Wt 167 lb 2 oz (75.8 kg)   SpO2 94%   BMI 31.58 kg/m²     Last documented pain score (0-10 scale): Pain Level: 0  Last Weight:   Wt Readings from Last 1 Encounters:   01/23/23 167 lb 2 oz (75.8 kg)     Mental Status:  oriented and alert    IV Access:  - None    Nursing Mobility/ADLs:  Walking   Dependent  Transfer  Dependent  Bathing  Dependent  Dressing  Dependent  Toileting  Dependent  Feeding  Dependent  Med Admin  Dependent  Med Delivery   whole    Wound Care Documentation and Therapy:  Incision 01/12/23 Thigh Left;Lateral (Active)   Dressing Status Clean;Dry; Intact 01/23/23 0740   Incision Cleansed Not Cleansed 01/23/23 0740   Dressing/Treatment Foam 01/23/23 0740   Drainage Amount None 01/23/23 0740   Odor None 01/23/23 0740   Number of days: 10       Incision 01/12/23 Thigh Left;Lateral (Active)   Dressing Status Clean;Dry; Intact 01/23/23 0740   Incision Cleansed Not Cleansed 01/23/23 0740   Dressing/Treatment Foam 01/23/23 0740   Drainage Amount None 01/23/23 0740   Odor None 01/23/23 0740   Number of days: 10       Incision 01/12/23 Hip Anterior;Left;Proximal (Active)   Dressing Status Clean;Dry; Intact 01/23/23 0740   Incision Cleansed Not Cleansed 01/23/23 0740   Dressing/Treatment Foam 01/23/23 0740   Drainage Amount None 01/23/23 0740   Odor None 01/23/23 0740   Number of days: 10        Elimination:  Continence:    Bowel: No  Bladder: No  Urinary Catheter: Indication for Use of Catheter: Urology/Urologist seeing this patient or inserted indwelling catheter   Colostomy/Ileostomy/Ileal Conduit: No       Date of Last BM: 1/23/2023    Intake/Output Summary (Last 24 hours) at 1/23/2023 1608  Last data filed at 1/23/2023 1358  Gross per 24 hour   Intake 320 ml   Output 2150 ml   Net -1830 ml     I/O last 3 completed shifts: In: 0196 [P.O.:720; I.V.:415]  Out: 3675 [Urine:3675]    Safety Concerns: At Risk for Falls    Impairments/Disabilities:      None    Nutrition Therapy:  Current Nutrition Therapy:   - Oral Diet:  General    Routes of Feeding: Oral  Liquids: Thin Liquids  Daily Fluid Restriction: no  Last Modified Barium Swallow with Video (Video Swallowing Test): not done    Treatments at the Time of Hospital Discharge:   Respiratory Treatments: ***  Oxygen Therapy:  is on oxygen at 2 L/min per nasal cannula. Ventilator:    - No ventilator support    Rehab Therapies: Physical Therapy and Occupational Therapy  Weight Bearing Status/Restrictions: No weight bearing restrictions  Other Medical Equipment (for information only, NOT a DME order):  hospital bed  Other Treatments: ***    Patient's personal belongings (please select all that are sent with patient):  Bessie    RN SIGNATURE:  Electronically signed by Will Henderson RN on 1/23/23 at 6:10 PM EST    CASE MANAGEMENT/SOCIAL WORK SECTION    Inpatient Status Date: 1/12/23    Readmission Risk Assessment Score:  Readmission Risk              Risk of Unplanned Readmission:  20           Discharging to Facility/ Agency   [facility] at [address]    [phone]             Dialysis Facility (if applicable)   Name:  Address:  Dialysis Schedule:  Phone:  Fax:    / signature: Electronically signed by RAHEEM Frye, LSW on 1/23/23 at 4:09 PM EST    PHYSICIAN SECTION    Prognosis: Good    Condition at Discharge: Stable    Rehab Potential (if transferring to Rehab): Fair    Recommended Labs or Other Treatments After Discharge: complete abx as ordered. Physical therapy / occupational therapy as required.      Physician Certification: I certify the above information and transfer of Isaiah Funez  is necessary for the continuing treatment of the diagnosis listed and that she requires [place] for less 30 days.      Update Admission H&P: No change in H&P    PHYSICIAN SIGNATURE:  Electronically signed by Kt Bose MD on 1/23/23 at 4:15 PM EST

## 2023-08-24 ENCOUNTER — HOSPITAL ENCOUNTER (OUTPATIENT)
Dept: PHYSICAL THERAPY | Age: 88
Setting detail: THERAPIES SERIES
Discharge: HOME | End: 2023-08-24

## 2023-08-24 NOTE — CARE COORDINATION
[facility]  [address], [address] [facility], [address] [place] [address] Avenue  Phone: [phone]   Fax: [phone]    Physical Therapy Missed Visit Note     Date:  2023    Patient Name:  Pastor Mathis      :  1930    MRN: [id number]      Cancelled visits to date: 0  No-shows to date: 1  Eval 2023    For today's appointment patient:  []  Cancelled  []  Rescheduled appointment  [x]  No-show     Reason given by patient:  []  Patient ill  []  Conflicting appointment  []  No transportation    []  Conflict with work  []  No reason given  []  Other:     Comments:      Electronically signed by:   Haydee Zavala, DPT [id number]

## 2024-03-24 ENCOUNTER — APPOINTMENT (OUTPATIENT)
Dept: GENERAL RADIOLOGY | Age: 89
DRG: 312 | End: 2024-03-24
Payer: MEDICARE

## 2024-03-24 ENCOUNTER — HOSPITAL ENCOUNTER (INPATIENT)
Age: 89
LOS: 2 days | Discharge: HOME HEALTH | DRG: 312 | End: 2024-03-27
Attending: STUDENT IN AN ORGANIZED HEALTH CARE EDUCATION/TRAINING PROGRAM | Admitting: INTERNAL MEDICINE
Payer: MEDICARE

## 2024-03-24 DIAGNOSIS — N17.9 AKI (ACUTE KIDNEY INJURY) (HCC): Primary | ICD-10-CM

## 2024-03-24 DIAGNOSIS — R55 SYNCOPE AND COLLAPSE: ICD-10-CM

## 2024-03-24 LAB
ANION GAP SERPL CALCULATED.3IONS-SCNC: 15 MMOL/L (ref 3–16)
BASOPHILS # BLD: 0 K/UL (ref 0–0.2)
BASOPHILS NFR BLD: 0.4 %
BUN SERPL-MCNC: 31 MG/DL (ref 7–20)
CALCIUM SERPL-MCNC: 9.2 MG/DL (ref 8.3–10.6)
CHLORIDE SERPL-SCNC: 96 MMOL/L (ref 99–110)
CO2 SERPL-SCNC: 24 MMOL/L (ref 21–32)
CREAT SERPL-MCNC: 1.3 MG/DL (ref 0.6–1.2)
DEPRECATED RDW RBC AUTO: 14.5 % (ref 12.4–15.4)
EOSINOPHIL # BLD: 0 K/UL (ref 0–0.6)
EOSINOPHIL NFR BLD: 0 %
GFR SERPLBLD CREATININE-BSD FMLA CKD-EPI: 38 ML/MIN/{1.73_M2}
GLUCOSE SERPL-MCNC: 127 MG/DL (ref 70–99)
HCT VFR BLD AUTO: 37.9 % (ref 36–48)
HGB BLD-MCNC: 12.8 G/DL (ref 12–16)
LYMPHOCYTES # BLD: 0.7 K/UL (ref 1–5.1)
LYMPHOCYTES NFR BLD: 10.6 %
MCH RBC QN AUTO: 31.1 PG (ref 26–34)
MCHC RBC AUTO-ENTMCNC: 33.7 G/DL (ref 31–36)
MCV RBC AUTO: 92.1 FL (ref 80–100)
MONOCYTES # BLD: 0.7 K/UL (ref 0–1.3)
MONOCYTES NFR BLD: 11.1 %
NEUTROPHILS # BLD: 4.9 K/UL (ref 1.7–7.7)
NEUTROPHILS NFR BLD: 77.9 %
NT-PROBNP SERPL-MCNC: 1039 PG/ML (ref 0–449)
PLATELET # BLD AUTO: 127 K/UL (ref 135–450)
PMV BLD AUTO: 8.5 FL (ref 5–10.5)
POTASSIUM SERPL-SCNC: 3.9 MMOL/L (ref 3.5–5.1)
RBC # BLD AUTO: 4.11 M/UL (ref 4–5.2)
SODIUM SERPL-SCNC: 135 MMOL/L (ref 136–145)
TROPONIN, HIGH SENSITIVITY: 22 NG/L (ref 0–14)
WBC # BLD AUTO: 6.2 K/UL (ref 4–11)

## 2024-03-24 PROCEDURE — 2580000003 HC RX 258

## 2024-03-24 PROCEDURE — 80048 BASIC METABOLIC PNL TOTAL CA: CPT

## 2024-03-24 PROCEDURE — 99285 EMERGENCY DEPT VISIT HI MDM: CPT

## 2024-03-24 PROCEDURE — 93005 ELECTROCARDIOGRAM TRACING: CPT

## 2024-03-24 PROCEDURE — 83880 ASSAY OF NATRIURETIC PEPTIDE: CPT

## 2024-03-24 PROCEDURE — 96360 HYDRATION IV INFUSION INIT: CPT

## 2024-03-24 PROCEDURE — 96361 HYDRATE IV INFUSION ADD-ON: CPT

## 2024-03-24 PROCEDURE — 84484 ASSAY OF TROPONIN QUANT: CPT

## 2024-03-24 PROCEDURE — 71045 X-RAY EXAM CHEST 1 VIEW: CPT

## 2024-03-24 PROCEDURE — 85025 COMPLETE CBC W/AUTO DIFF WBC: CPT

## 2024-03-24 PROCEDURE — 36415 COLL VENOUS BLD VENIPUNCTURE: CPT

## 2024-03-24 RX ORDER — 0.9 % SODIUM CHLORIDE 0.9 %
1000 INTRAVENOUS SOLUTION INTRAVENOUS ONCE
Status: COMPLETED | OUTPATIENT
Start: 2024-03-24 | End: 2024-03-25

## 2024-03-24 RX ADMIN — SODIUM CHLORIDE 1000 ML: 9 INJECTION, SOLUTION INTRAVENOUS at 22:00

## 2024-03-24 ASSESSMENT — PAIN DESCRIPTION - LOCATION: LOCATION: HIP

## 2024-03-24 ASSESSMENT — PAIN DESCRIPTION - ORIENTATION: ORIENTATION: LEFT

## 2024-03-24 ASSESSMENT — PAIN - FUNCTIONAL ASSESSMENT: PAIN_FUNCTIONAL_ASSESSMENT: 0-10

## 2024-03-25 ENCOUNTER — APPOINTMENT (OUTPATIENT)
Dept: GENERAL RADIOLOGY | Age: 89
DRG: 312 | End: 2024-03-25
Payer: MEDICARE

## 2024-03-25 PROBLEM — R55 SYNCOPE, UNSPECIFIED SYNCOPE TYPE: Status: ACTIVE | Noted: 2024-03-25

## 2024-03-25 LAB
ALBUMIN SERPL-MCNC: 3.7 G/DL (ref 3.4–5)
ALBUMIN/GLOB SERPL: 1.4 {RATIO} (ref 1.1–2.2)
ALP SERPL-CCNC: 76 U/L (ref 40–129)
ALT SERPL-CCNC: 7 U/L (ref 10–40)
AMORPH SED URNS QL MICRO: ABNORMAL /HPF
ANION GAP SERPL CALCULATED.3IONS-SCNC: 12 MMOL/L (ref 3–16)
AST SERPL-CCNC: 14 U/L (ref 15–37)
BACTERIA URNS QL MICRO: ABNORMAL /HPF
BASOPHILS # BLD: 0 K/UL (ref 0–0.2)
BASOPHILS NFR BLD: 0.6 %
BILIRUB SERPL-MCNC: 0.4 MG/DL (ref 0–1)
BILIRUB UR QL STRIP.AUTO: NEGATIVE
BUN SERPL-MCNC: 29 MG/DL (ref 7–20)
CALCIUM SERPL-MCNC: 8.9 MG/DL (ref 8.3–10.6)
CHLORIDE SERPL-SCNC: 100 MMOL/L (ref 99–110)
CLARITY UR: CLEAR
CO2 SERPL-SCNC: 25 MMOL/L (ref 21–32)
COLOR UR: YELLOW
CREAT SERPL-MCNC: 1.1 MG/DL (ref 0.6–1.2)
DEPRECATED RDW RBC AUTO: 14.2 % (ref 12.4–15.4)
EKG ATRIAL RATE: 75 BPM
EKG DIAGNOSIS: NORMAL
EKG P AXIS: 36 DEGREES
EKG P-R INTERVAL: 136 MS
EKG Q-T INTERVAL: 432 MS
EKG QRS DURATION: 128 MS
EKG QTC CALCULATION (BAZETT): 482 MS
EKG R AXIS: -53 DEGREES
EKG T AXIS: 32 DEGREES
EKG VENTRICULAR RATE: 75 BPM
EOSINOPHIL # BLD: 0 K/UL (ref 0–0.6)
EOSINOPHIL NFR BLD: 0 %
EPI CELLS #/AREA URNS HPF: ABNORMAL /HPF (ref 0–5)
GFR SERPLBLD CREATININE-BSD FMLA CKD-EPI: 47 ML/MIN/{1.73_M2}
GLUCOSE SERPL-MCNC: 102 MG/DL (ref 70–99)
GLUCOSE UR STRIP.AUTO-MCNC: NEGATIVE MG/DL
HCT VFR BLD AUTO: 36 % (ref 36–48)
HGB BLD-MCNC: 12.3 G/DL (ref 12–16)
HGB UR QL STRIP.AUTO: NEGATIVE
KETONES UR STRIP.AUTO-MCNC: NEGATIVE MG/DL
LACTATE BLDV-SCNC: 1.2 MMOL/L (ref 0.4–2)
LEUKOCYTE ESTERASE UR QL STRIP.AUTO: ABNORMAL
LYMPHOCYTES # BLD: 0.7 K/UL (ref 1–5.1)
LYMPHOCYTES NFR BLD: 12 %
MAGNESIUM SERPL-MCNC: 2 MG/DL (ref 1.8–2.4)
MCH RBC QN AUTO: 31.2 PG (ref 26–34)
MCHC RBC AUTO-ENTMCNC: 34.2 G/DL (ref 31–36)
MCV RBC AUTO: 91.2 FL (ref 80–100)
MONOCYTES # BLD: 0.6 K/UL (ref 0–1.3)
MONOCYTES NFR BLD: 10.3 %
NEUTROPHILS # BLD: 4.5 K/UL (ref 1.7–7.7)
NEUTROPHILS NFR BLD: 77.1 %
NITRITE UR QL STRIP.AUTO: NEGATIVE
PH UR STRIP.AUTO: 6 [PH] (ref 5–8)
PLATELET # BLD AUTO: 110 K/UL (ref 135–450)
PMV BLD AUTO: 7.7 FL (ref 5–10.5)
POTASSIUM SERPL-SCNC: 3.4 MMOL/L (ref 3.5–5.1)
PROT SERPL-MCNC: 6.4 G/DL (ref 6.4–8.2)
PROT UR STRIP.AUTO-MCNC: NEGATIVE MG/DL
RBC # BLD AUTO: 3.94 M/UL (ref 4–5.2)
RBC #/AREA URNS HPF: ABNORMAL /HPF (ref 0–4)
SODIUM SERPL-SCNC: 137 MMOL/L (ref 136–145)
SP GR UR STRIP.AUTO: 1.02 (ref 1–1.03)
TROPONIN, HIGH SENSITIVITY: 19 NG/L (ref 0–14)
UA COMPLETE W REFLEX CULTURE PNL UR: ABNORMAL
UA DIPSTICK W REFLEX MICRO PNL UR: YES
URN SPEC COLLECT METH UR: ABNORMAL
UROBILINOGEN UR STRIP-ACNC: 0.2 E.U./DL
WBC # BLD AUTO: 5.9 K/UL (ref 4–11)
WBC #/AREA URNS HPF: ABNORMAL /HPF (ref 0–5)

## 2024-03-25 PROCEDURE — 83605 ASSAY OF LACTIC ACID: CPT

## 2024-03-25 PROCEDURE — 6370000000 HC RX 637 (ALT 250 FOR IP)

## 2024-03-25 PROCEDURE — 36415 COLL VENOUS BLD VENIPUNCTURE: CPT

## 2024-03-25 PROCEDURE — 2060000000 HC ICU INTERMEDIATE R&B

## 2024-03-25 PROCEDURE — 85025 COMPLETE CBC W/AUTO DIFF WBC: CPT

## 2024-03-25 PROCEDURE — 83735 ASSAY OF MAGNESIUM: CPT

## 2024-03-25 PROCEDURE — 81001 URINALYSIS AUTO W/SCOPE: CPT

## 2024-03-25 PROCEDURE — 2580000003 HC RX 258

## 2024-03-25 PROCEDURE — 6360000002 HC RX W HCPCS

## 2024-03-25 PROCEDURE — 80053 COMPREHEN METABOLIC PANEL: CPT

## 2024-03-25 PROCEDURE — C8929 TTE W OR WO FOL WCON,DOPPLER: HCPCS

## 2024-03-25 RX ORDER — ACETAMINOPHEN 325 MG/1
650 TABLET ORAL ONCE
Status: COMPLETED | OUTPATIENT
Start: 2024-03-25 | End: 2024-03-25

## 2024-03-25 RX ORDER — LEVOTHYROXINE SODIUM 112 UG/1
112 TABLET ORAL DAILY
Status: DISCONTINUED | OUTPATIENT
Start: 2024-03-25 | End: 2024-03-27 | Stop reason: HOSPADM

## 2024-03-25 RX ORDER — SODIUM CHLORIDE 0.9 % (FLUSH) 0.9 %
5-40 SYRINGE (ML) INJECTION EVERY 12 HOURS SCHEDULED
Status: DISCONTINUED | OUTPATIENT
Start: 2024-03-25 | End: 2024-03-27 | Stop reason: HOSPADM

## 2024-03-25 RX ORDER — ENOXAPARIN SODIUM 100 MG/ML
40 INJECTION SUBCUTANEOUS DAILY
Status: DISCONTINUED | OUTPATIENT
Start: 2024-03-25 | End: 2024-03-25

## 2024-03-25 RX ORDER — ACETAMINOPHEN 650 MG/1
650 SUPPOSITORY RECTAL EVERY 6 HOURS PRN
Status: DISCONTINUED | OUTPATIENT
Start: 2024-03-25 | End: 2024-03-25

## 2024-03-25 RX ORDER — POTASSIUM CHLORIDE 20 MEQ/1
40 TABLET, EXTENDED RELEASE ORAL PRN
Status: DISCONTINUED | OUTPATIENT
Start: 2024-03-25 | End: 2024-03-25

## 2024-03-25 RX ORDER — POLYETHYLENE GLYCOL 3350 17 G/17G
17 POWDER, FOR SOLUTION ORAL DAILY PRN
Status: DISCONTINUED | OUTPATIENT
Start: 2024-03-25 | End: 2024-03-27 | Stop reason: HOSPADM

## 2024-03-25 RX ORDER — OLANZAPINE 5 MG/1
5 TABLET ORAL NIGHTLY
Status: DISCONTINUED | OUTPATIENT
Start: 2024-03-25 | End: 2024-03-27 | Stop reason: HOSPADM

## 2024-03-25 RX ORDER — LIDOCAINE 4 G/G
2 PATCH TOPICAL DAILY
Status: DISCONTINUED | OUTPATIENT
Start: 2024-03-25 | End: 2024-03-27 | Stop reason: HOSPADM

## 2024-03-25 RX ORDER — ASPIRIN 81 MG/1
81 TABLET ORAL DAILY
Status: DISCONTINUED | OUTPATIENT
Start: 2024-03-25 | End: 2024-03-27 | Stop reason: HOSPADM

## 2024-03-25 RX ORDER — TRAMADOL HYDROCHLORIDE 50 MG/1
50 TABLET ORAL EVERY 6 HOURS PRN
Status: DISCONTINUED | OUTPATIENT
Start: 2024-03-25 | End: 2024-03-26

## 2024-03-25 RX ORDER — ACETAMINOPHEN 325 MG/1
650 TABLET ORAL EVERY 6 HOURS PRN
Status: DISCONTINUED | OUTPATIENT
Start: 2024-03-25 | End: 2024-03-25

## 2024-03-25 RX ORDER — ENOXAPARIN SODIUM 100 MG/ML
30 INJECTION SUBCUTANEOUS DAILY
Status: DISCONTINUED | OUTPATIENT
Start: 2024-03-25 | End: 2024-03-27

## 2024-03-25 RX ORDER — LUBIPROSTONE 8 UG/1
8 CAPSULE ORAL 2 TIMES DAILY WITH MEALS
Status: DISCONTINUED | OUTPATIENT
Start: 2024-03-25 | End: 2024-03-26

## 2024-03-25 RX ORDER — AMIODARONE HYDROCHLORIDE 200 MG/1
200 TABLET ORAL DAILY
Status: DISCONTINUED | OUTPATIENT
Start: 2024-03-25 | End: 2024-03-26

## 2024-03-25 RX ORDER — SODIUM CHLORIDE 0.9 % (FLUSH) 0.9 %
5-40 SYRINGE (ML) INJECTION PRN
Status: DISCONTINUED | OUTPATIENT
Start: 2024-03-25 | End: 2024-03-27 | Stop reason: HOSPADM

## 2024-03-25 RX ORDER — ACETAMINOPHEN 500 MG
1000 TABLET ORAL EVERY 4 HOURS
Status: DISCONTINUED | OUTPATIENT
Start: 2024-03-25 | End: 2024-03-27 | Stop reason: HOSPADM

## 2024-03-25 RX ORDER — GABAPENTIN 100 MG/1
400 CAPSULE ORAL
COMMUNITY

## 2024-03-25 RX ORDER — POTASSIUM CHLORIDE 7.45 MG/ML
10 INJECTION INTRAVENOUS PRN
Status: DISCONTINUED | OUTPATIENT
Start: 2024-03-25 | End: 2024-03-25

## 2024-03-25 RX ORDER — ONDANSETRON 2 MG/ML
4 INJECTION INTRAMUSCULAR; INTRAVENOUS EVERY 6 HOURS PRN
Status: DISCONTINUED | OUTPATIENT
Start: 2024-03-25 | End: 2024-03-27 | Stop reason: HOSPADM

## 2024-03-25 RX ORDER — SODIUM CHLORIDE 9 MG/ML
INJECTION, SOLUTION INTRAVENOUS PRN
Status: DISCONTINUED | OUTPATIENT
Start: 2024-03-25 | End: 2024-03-27 | Stop reason: HOSPADM

## 2024-03-25 RX ORDER — ATORVASTATIN CALCIUM 10 MG/1
10 TABLET, FILM COATED ORAL DAILY
Status: DISCONTINUED | OUTPATIENT
Start: 2024-03-25 | End: 2024-03-27 | Stop reason: HOSPADM

## 2024-03-25 RX ORDER — ONDANSETRON 4 MG/1
4 TABLET, ORALLY DISINTEGRATING ORAL EVERY 8 HOURS PRN
Status: DISCONTINUED | OUTPATIENT
Start: 2024-03-25 | End: 2024-03-27 | Stop reason: HOSPADM

## 2024-03-25 RX ORDER — PANTOPRAZOLE SODIUM 40 MG/1
40 TABLET, DELAYED RELEASE ORAL
Status: DISCONTINUED | OUTPATIENT
Start: 2024-03-25 | End: 2024-03-27 | Stop reason: HOSPADM

## 2024-03-25 RX ORDER — LEVOTHYROXINE SODIUM 125 MCG
125 TABLET ORAL DAILY
COMMUNITY
Start: 2024-03-02

## 2024-03-25 RX ORDER — SODIUM CHLORIDE, SODIUM LACTATE, POTASSIUM CHLORIDE, CALCIUM CHLORIDE 600; 310; 30; 20 MG/100ML; MG/100ML; MG/100ML; MG/100ML
INJECTION, SOLUTION INTRAVENOUS CONTINUOUS
Status: DISCONTINUED | OUTPATIENT
Start: 2024-03-25 | End: 2024-03-26

## 2024-03-25 RX ORDER — MAGNESIUM SULFATE IN WATER 40 MG/ML
2000 INJECTION, SOLUTION INTRAVENOUS PRN
Status: DISCONTINUED | OUTPATIENT
Start: 2024-03-25 | End: 2024-03-27 | Stop reason: HOSPADM

## 2024-03-25 RX ORDER — ERGOCALCIFEROL 1.25 MG/1
50000 CAPSULE ORAL
COMMUNITY
Start: 2024-03-02

## 2024-03-25 RX ADMIN — SODIUM CHLORIDE, PRESERVATIVE FREE 10 ML: 5 INJECTION INTRAVENOUS at 05:23

## 2024-03-25 RX ADMIN — ENOXAPARIN SODIUM 30 MG: 100 INJECTION SUBCUTANEOUS at 11:04

## 2024-03-25 RX ADMIN — ACETAMINOPHEN 1000 MG: 500 TABLET ORAL at 21:23

## 2024-03-25 RX ADMIN — ACETAMINOPHEN 1000 MG: 500 TABLET ORAL at 13:23

## 2024-03-25 RX ADMIN — LEVOTHYROXINE SODIUM 112 MCG: 0.11 TABLET ORAL at 11:03

## 2024-03-25 RX ADMIN — ASPIRIN 81 MG: 81 TABLET, COATED ORAL at 11:06

## 2024-03-25 RX ADMIN — SODIUM CHLORIDE, PRESERVATIVE FREE 10 ML: 5 INJECTION INTRAVENOUS at 11:05

## 2024-03-25 RX ADMIN — SODIUM CHLORIDE, POTASSIUM CHLORIDE, SODIUM LACTATE AND CALCIUM CHLORIDE: 600; 310; 30; 20 INJECTION, SOLUTION INTRAVENOUS at 19:36

## 2024-03-25 RX ADMIN — OLANZAPINE 5 MG: 5 TABLET, FILM COATED ORAL at 21:23

## 2024-03-25 RX ADMIN — PANTOPRAZOLE SODIUM 40 MG: 40 TABLET, DELAYED RELEASE ORAL at 11:03

## 2024-03-25 RX ADMIN — SODIUM CHLORIDE, POTASSIUM CHLORIDE, SODIUM LACTATE AND CALCIUM CHLORIDE: 600; 310; 30; 20 INJECTION, SOLUTION INTRAVENOUS at 05:24

## 2024-03-25 RX ADMIN — POTASSIUM BICARBONATE 40 MEQ: 782 TABLET, EFFERVESCENT ORAL at 11:01

## 2024-03-25 RX ADMIN — ACETAMINOPHEN 650 MG: 325 TABLET ORAL at 01:01

## 2024-03-25 ASSESSMENT — PAIN SCALES - GENERAL
PAINLEVEL_OUTOF10: 0
PAINLEVEL_OUTOF10: 3
PAINLEVEL_OUTOF10: 8

## 2024-03-25 ASSESSMENT — ENCOUNTER SYMPTOMS
SORE THROAT: 0
SHORTNESS OF BREATH: 0
RHINORRHEA: 0
ABDOMINAL PAIN: 0
VOMITING: 0
BLOOD IN STOOL: 0
CHEST TIGHTNESS: 0
ABDOMINAL DISTENTION: 0
CONSTIPATION: 0
DIARRHEA: 0
NAUSEA: 0

## 2024-03-25 ASSESSMENT — PAIN DESCRIPTION - FREQUENCY: FREQUENCY: CONTINUOUS

## 2024-03-25 ASSESSMENT — PAIN DESCRIPTION - LOCATION: LOCATION: HIP

## 2024-03-25 ASSESSMENT — PAIN DESCRIPTION - ORIENTATION: ORIENTATION: LEFT

## 2024-03-25 ASSESSMENT — PAIN DESCRIPTION - ONSET: ONSET: ON-GOING

## 2024-03-25 ASSESSMENT — PAIN - FUNCTIONAL ASSESSMENT: PAIN_FUNCTIONAL_ASSESSMENT: PREVENTS OR INTERFERES SOME ACTIVE ACTIVITIES AND ADLS

## 2024-03-25 ASSESSMENT — PAIN DESCRIPTION - PAIN TYPE: TYPE: ACUTE PAIN

## 2024-03-25 ASSESSMENT — PAIN DESCRIPTION - DESCRIPTORS: DESCRIPTORS: SHARP

## 2024-03-25 NOTE — PLAN OF CARE
Problem: Discharge Planning  Goal: Discharge to home or other facility with appropriate resources  3/25/2024 1138 by Venkat Adler RN  Outcome: Progressing  3/25/2024 0611 by Evelio Barros RN  Note: From Home with [facility]. Consult placed to case management for D/C planning.      Problem: Pain  Goal: Verbalizes/displays adequate comfort level or baseline comfort level  3/25/2024 1138 by Venkat Adler RN  Outcome: Progressing  3/25/2024 0611 by Evelio Barros RN  Outcome: Progressing  Flowsheets (Taken 3/25/2024 0611)  Verbalizes/displays adequate comfort level or baseline comfort level: Assess pain using appropriate pain scale  Note: Complains of pain to left hip and left shoulder.  Repositioned for comfort.  Declines need for pain medication at this time.       Problem: Skin/Tissue Integrity  Goal: Absence of new skin breakdown  Description: 1.  Monitor for areas of redness and/or skin breakdown  2.  Assess vascular access sites hourly  3.  Every 4-6 hours minimum:  Change oxygen saturation probe site  4.  Every 4-6 hours:  If on nasal continuous positive airway pressure, respiratory therapy assess nares and determine need for appliance change or resting period.  Outcome: Progressing     Problem: ABCDS Injury Assessment  Goal: Absence of physical injury  Outcome: Progressing     Problem: Safety - Adult  Goal: Free from fall injury  3/25/2024 1138 by Venkat Adler RN  Outcome: Progressing  3/25/2024 0611 by Evelio Barros RN  Flowsheets (Taken 3/25/2024 0611)  Free From Fall Injury: Instruct family/caregiver on patient safety  Note: Fall precautions in place and bed alarm in use.  Bed in locked and low position.  SR up x3.

## 2024-03-25 NOTE — PROGRESS NOTES
Consult received for difficult to mobilize. No indication for inpatient orthopedic evaluation. Evaluation by PT per primary. Recommend imaging areas of pain and reconsulting for acute orthopedic issues as needed

## 2024-03-25 NOTE — PROGRESS NOTES
4 Eyes Skin Assessment     NAME:  Jen Costa  YOB: 1930  MEDICAL RECORD NUMBER:  [id number]    The patient is being assessed for  Admission    I agree that at least one RN has performed a thorough Head to Toe Skin Assessment on the patient. ALL assessment sites listed below have been assessed.      Areas assessed by both nurses:    Head, Face, Ears, Shoulders, Back, Chest, Arms, Elbows, Hands, Sacrum. Buttock, Coccyx, Ischium, Legs. Feet and Heels, and Under Medical Devices         Does the Patient have a Wound? Yes wound(s) were present on assessment. LDA wound assessment was Initiated and completed by RN    Fissures bilat heels, blanchable redness heels, blanchable redness coccyx, skin tear to right shin       Jaspreet Prevention initiated by RN: Yes  Wound Care Orders initiated by RN: No    Pressure Injury (Stage 3,4, Unstageable, DTI, NWPT, and Complex wounds) if present, place Wound referral order by RN under : No    New Ostomies, if present place, Ostomy referral order under : No     Nurse 1 eSignature: Electronically signed by Isabell Corcoran RN on 3/25/24 at 5:04 AM EDT      Nurse 2 eSignature: Electronically signed by Evelio Barros RN on 3/25/24 at 6:03 AM EDT

## 2024-03-25 NOTE — PROGRESS NOTES
Pt is alert and oriented x4. VSS. Pt c/o right hip pain 3/10 on 0-10 pain scale. IVF LR@75mL/hr. Scheduled meds given. Amiodarone held, lipitor held, per pt family, currently not taking as an at home med. Provider informed and advised to hold. Denies any nausea/vomiting.  Encouraged to call out for assistance when needed. Call bell in reach. Will continue to monitor.

## 2024-03-25 NOTE — PROGRESS NOTES
Patient admitted to room 4317 via stretcher from ED at 0430.  Patient alert and oriented x3.  VSS.  C/O pain to left hip 8-9/10 at its worst.  Reports she received Tylenol in ED and pain is improving.  Declines need for further pain medication at this time.  Oriented to room and reviewed plan of care.  Admission questions answered.  Patient cannot remember all her medication so medication reconciliation not complete at this time.  States she take 2 cancer medications but cannot remember their names.  Fall precautions in place and bed alarm in use.  IVF infusing as ordered.  Patient declines orthostatic vital signs at this time due to complaints of pain.  Repositioned for comfort. Call light is in reach.

## 2024-03-25 NOTE — DISCHARGE INSTR - COC
Continuity of Care Form    Patient Name: Jen Costa   :  1930  MRN:  [id number]    Admit date:  3/24/2024  Discharge date:  ***    Code Status Order: DNR-CCA   Advance Directives:     Admitting Physician:  Radha Bach MD  PCP: Hola Mattson    Discharging Nurse: ***  Discharging Hospital Unit/Room#: 4317/4317-01  Discharging Unit Phone Number: ***    Emergency Contact:   Extended Emergency Contact Information  Primary Emergency Contact: [place] [address] [facility]  Home Phone: [phone]  Mobile Phone: [phone]  Relation: Child  Secondary Emergency Contact: FritzCindy dowling  Mobile Phone: [phone]  Relation: Daughter-in-Law    Past Surgical History:  Past Surgical History:   Procedure Laterality Date    BREAST LUMPECTOMY      Right     SECTION      HIP SURGERY Left 2023    LEFT HIP INTERTROCHOANTERIC NAILING performed by Gadiel Candelaria MD at [facility] OR    MASTECTOMY      Left       Immunization History:   Immunization History   Administered Date(s) Administered    COVID-19, MODERNA BLUE border, Primary or Immunocompromised, (age 12y+), IM, 100 mcg/0.5mL 2021    COVID-19, MODERNA, ( formula), (age 12y+), IM, 50mcg/0.5mL 2023       Active Problems:  Patient Active Problem List   Diagnosis Code    Actinic keratosis L57.0    Basal cell carcinoma of right mid back C44.519    Visit for suture removal Z48.02    Lumbar radiculopathy M54.16    Compression fracture of T12 vertebra with routine healing S22.080D    Osteoarthritis of spine with radiculopathy, lumbar region M47.26    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

## 2024-03-25 NOTE — PROGRESS NOTES
Comprehensive Nutrition Assessment    RECOMMENDATIONS:  PO Diet: Continue regular diet  ONS: Add Ensure MS BID  Nutrition Education: Education not indicated     NUTRITION ASSESSMENT:   Nutritional summary & status: MST: Patient w/ varing wts per EMR, 13# wt difference in 1 day during admit. Do not suspect significant wt loss, pt reports 15# wt loss x 1 year (13%, not significant). Per pt, decrease in PO intake in a year d/t hip pain/hip surgery 01/2023. Pt eats 3 meals per day, however, small portions (cereal, fruit/soup). Pt has a caterer bring food to her home weekly. Reports strong appetite in morning, discussed high kcal/high protein foods to choose. Drinks Ensure at home (mixes w/ coffee, dislikes taste of them on their own), will order Ensure MS.     Admission // PMH: syncope // breast carcinoma, HLD, osteoarthritis    MALNUTRITION ASSESSMENT  Context of Malnutrition: Chronic Illness   Malnutrition Status: At risk for malnutrition (Comment)  Findings of the 6 clinical characteristics of malnutrition (Minimum of 2 out of 6 clinical characteristics is required to make the diagnosis of moderate or severe Protein Calorie Malnutrition based on AND/ASPEN Guidelines):  Energy Intake:  Mild decrease in energy intake (Comment)  Weight Loss:  Mild weight loss (specify amount and time period)     Body Fat Loss:  Unable to assess     Muscle Mass Loss:  Unable to assess        NUTRITION DIAGNOSIS   Unintended weight loss related to inadequate protein-energy intake as evidenced by poor intake prior to admission    Nutrition Monitoring and Evaluation:   Food/Nutrient Intake Outcomes:  Food and Nutrient Intake, Supplement Intake  Physical Signs/Symptoms Outcomes:  Biochemical Data, Nutrition Focused Physical Findings, Weight     OBJECTIVE DATA: Significant to nutrition assessment  Nutrition Related Findings: K 3.4,  Wounds: None  Nutrition Goals: Meet at least 75% of estimated needs, by next RD assessment     CURRENT

## 2024-03-25 NOTE — H&P
Internal Medicine H&P    Date: 3/25/2024   Patient: Jen Costa   Patient : 1930     CC: Dizziness (C/o complaints of syncopal episode and dizziness while talking with a friend. Denies sob/cp. Symptoms have since resolved. Recently started taking a new pain medication for left hip pain. No hx of seizure. )       Subjective     HPI: 89-year-old female with PMH of breast carcinoma, HLD, osteoarthritis, left gluteus filiberto hematoma presented with an episode of dizziness.    According to the patient, she was in her usual state of health till today.  She was having dinner in the kitchen table, when she experienced blurry vision, felt \"dizzy\".  Patient denies any loss of consciousness, fall.  However, friend reports that patient did lose consciousness for 4 minutes, and was out of it.  No diaphoresis, palpitations, chest pain, tonic-clonic movements observed.  There has been, the patient's friend called EMS.  Once EMS arrived, patient gained consciousness.  Patient denies any recent falls, prior episodes of syncope.  She did report that she feels lightheaded occasionally when she gets up in the morning, gets out of bed.  She has decreased p.o. intake, hardly eats meals because of loss of appetite since last year when she underwent TAVR/started experiencing hip pain.  She had lost 15 pounds since last year.  She only drinks minimum water with her medications.  Patient lives independently at home, she does her ADLs herself but tries to find shortcuts (gets food made).     Patient has a history of left hip cephalomedullary nail in 2023.  She follows up with orthopedics and has had tear of left gluteus filiberto muscle recently.  Patient was taking meloxicam for hip pain. she started experiencing worsened pain in her left hip yesterday.  She reports the pain is 9/10, does, constant, radiating from hip down to the leg, exacerbated on leaning, severely limiting mobility.  Patient denies any recent activity  vision after being on anastrozole.  Discontinuation of medication, improved her vision.  However, she still have blurred vision.    -On review of records, patient seems to be on exemestane.  Medication not listed as home medication  - pharmacy to reconcile home medications.      # EKG findings-left atrial enlargement, RBBB  # S/P TAVR  Patient underwent TAVR on 7/11/2023.  Last echo in 01/23: LVEF 60 to 65%.  Diastolic filling parameters suggest grade 2 diastolic dysfunction.  AV leaflets thickened.  Severe aortic stenosis with peak velocity 5.3 m/s.      Chronic conditions:  HLD: Continue Lipitor 10 Mg daily  GERD: Continue Protonix 40 Mg  Hypothyroidism: Continue levothyroxine  IBS: Continue lubiprostone    DVT PPx: Lovenox  Diet: No diet orders on file   Code status:  Prior     ELOS:  Barriers to discharge:  Disposition  - Preadmission:  - Current:  - Upon discharge:    Will discuss with attending physician Radha Hugn MD Shazmah Shahrukh, MD  Internal Medicine, PGY-1

## 2024-03-25 NOTE — ED NOTES
ED TO INPATIENT SBAR HANDOFF    Patient Name: Jen Costa   :  1930  89 y.o.   MRN:  [id number]  Preferred Name    ED Room #:  A09/A09-09  Family/Caregiver Present yes   Restraints no   Sitter no   Sepsis Risk Score Sepsis Risk Score: 0.92    Situation  Code Status: Prior No additional code details.    Allergies: Patient has no known allergies.  Weight: Patient Vitals for the past 96 hrs (Last 3 readings):   Weight   24 2146 59.7 kg (131 lb 9.6 oz)     Arrived from: home  Chief Complaint:   Chief Complaint   Patient presents with    Dizziness     C/o complaints of syncopal episode and dizziness while talking with a friend. Denies sob/cp. Symptoms have since resolved. Recently started taking a new pain medication for left hip pain. No hx of seizure.      Hospital Problem/Diagnosis:  Principal Problem:    Syncope, unspecified syncope type  Resolved Problems:    * No resolved hospital problems. *    Imaging:   XR CHEST PORTABLE   Final Result   Impression:   1. Previously identified airspace disease in the right lung and left lung base has significantly decreased.   2. Mild hazy airspace opacity and minimal interstitial markings in the lungs greater on the right than the left could indicate mild pulmonary edema. Clinical correlation recommended.      Electronically signed by Tito Hernandez MD        Abnormal labs:   Abnormal Labs Reviewed   CBC WITH AUTO DIFFERENTIAL - Abnormal; Notable for the following components:       Result Value    Platelets 127 (*)     Lymphocytes Absolute 0.7 (*)     All other components within normal limits   TROPONIN - Abnormal; Notable for the following components:    Troponin, High Sensitivity 22 (*)     All other components within normal limits   BASIC METABOLIC PANEL W/ REFLEX TO MG FOR LOW K - Abnormal; Notable for the following components:    Sodium 135 (*)     Chloride 96 (*)     Glucose 127 (*)     BUN 31 (*)     Creatinine 1.3 (*)     Est, Glom Filt Rate 38 (*)

## 2024-03-25 NOTE — ED PROVIDER NOTES
ED Attending Attestation Note     Date of evaluation: 3/24/2024    This patient was seen by the resident.  I have seen and examined the patient, agree with the workup, evaluation, management and diagnosis. The care plan has been discussed.  I have reviewed the ECG and concur with the resident's interpretation.  My assessment reveals a 89-year-old female presenting to the hospital for evaluation of a near syncopal event.  Patient feeling lightheaded and dizzy and this occurred while she was sitting down in a chair.  Patient with no focal logical deficits.  Noted upon arrival.  She had chest x-ray which revealed some mild hazy opacity in the lungs on the right greater than the left.  She clinically appears dry and has an ANASTASIYA and has a known history of heart failure.  She was given IV fluids given an EF of 60 to 65% on her most recent echocardiogram a year ago.  Given her history she is also noted to have severe aortic stenosis on her echocardiogram done at that time and has since been status post TAVR in July 2023.  Given her risk factors patient at this time is to be admitted.  Cardiac enzymes are noted to be stable otherwise.  Friend was with the patient actually came and gave collateral information later stating that patient actually did syncopize for a few minutes.     Harris Yañez MD  03/25/24 0053

## 2024-03-25 NOTE — PLAN OF CARE
General Internal Medicine Attending    Chart and data reviewed.  Patient seen and examined, case discussed with medical resident.   Physical exam repeated.  Labs and imaging studies reviewed.       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 There is no stenosis. There is mild     paravalvular regurgitation.             Keep on telemetry    Update echo        Orthopedic consulted for  acute on chronic bck pain          Bassem Villalobos MD

## 2024-03-25 NOTE — PROGRESS NOTES
Internal Medicine Progress Note    Date: 3/25/2024   Patient: Jen Costa   [facility] Day: 0      CC: Dizziness (C/o complaints of syncopal episode and dizziness while talking with a friend. Denies sob/cp. Symptoms have since resolved. Recently started taking a new pain medication for left hip pain. No hx of seizure. )       Interval Hx   No further events overnight. Patient seen and examined this AM, patient still reporting significant pain from hip and having difficulty moving.      HPI:     \"89-year-old female with PMH of breast carcinoma, HLD, osteoarthritis, left gluteus filiberto hematoma presented with an episode of dizziness.     According to the patient, she was in her usual state of health till today.  She was having dinner in the kitchen table, when she experienced blurry vision, felt \"dizzy\".  Patient denies any loss of consciousness, fall.  However, friend reports that patient did lose consciousness for 4 minutes, and was out of it.  No diaphoresis, palpitations, chest pain, tonic-clonic movements observed.  There has been, the patient's friend called EMS.  Once EMS arrived, patient gained consciousness.  Patient denies any recent falls, prior episodes of syncope.  She did report that she feels lightheaded occasionally when she gets up in the morning, gets out of bed.  She has decreased p.o. intake, hardly eats meals because of loss of appetite since last year when she underwent TAVR/started experiencing hip pain.  She had lost 15 pounds since last year.  She only drinks minimum water with her medications.  Patient lives independently at home, she does her ADLs herself but tries to find shortcuts (gets food made).      Patient has a history of left hip cephalomedullary nail in 01/2023.  She follows up with orthopedics and has had tear of left gluteus filiberto muscle recently.  Patient was taking meloxicam for hip pain. she started experiencing worsened pain in her left hip yesterday.  She reports  the pain is 9/10, does, constant, radiating from hip down to the leg, exacerbated on leaning, severely limiting mobility.  Patient denies any recent activity changes, no strenuous exercise, no trauma or falls.  She reported that she has not even recently gone for a walk, and the pain came in just like that.  Patient denies any chest pain, shortness of breath, nausea, vomiting, headache diarrhea, dysuria, no blood in urine or stool. Patient denies smoking, alcohol use, drug use.     ED course:  Temp 97.4, HR 86, RR 22, /45.  Labs revealed elevated creatinine, 1.3 from baseline of 0.7.  BNP was elevated, thousand.  Troponin elevated: 22, trended down to 19.  WBC 6.2, not elevated.  Low platelets, 127.     CXR revealed significantly improved bilateral airspace disease.  EKG revealed RBBB, unchanged from prior.     In the ED, patient was given normal saline bolus 1000 cc and Tylenol.  Patient was admitted for further evaluation.\"    Objective     Vital Signs:  Patient Vitals for the past 8 hrs:   BP Temp Temp src Pulse Resp SpO2 Height Weight   03/25/24 1109 116/73 97.4 °F (36.3 °C) Oral 78 16 98 % -- --   03/25/24 0811 (!) 154/71 97.5 °F (36.4 °C) Oral 88 16 98 % -- --   03/25/24 0435 132/69 98.3 °F (36.8 °C) Oral 87 16 98 % 1.549 m (5' 1\") 53.9 kg (118 lb 13.3 oz)       Physical Exam  Constitutional:       General: She is not in acute distress.     Appearance: Normal appearance. She is ill-appearing.   HENT:      Head: Normocephalic and atraumatic.      Nose: No congestion.      Mouth/Throat:      Mouth: Mucous membranes are moist.   Eyes:      Extraocular Movements: Extraocular movements intact.   Cardiovascular:      Rate and Rhythm: Normal rate and regular rhythm.      Heart sounds: Murmur heard.   Pulmonary:      Effort: Pulmonary effort is normal. No respiratory distress.      Breath sounds: Normal breath sounds. No wheezing or rales.   Abdominal:      General: Abdomen is flat. There is no distension.

## 2024-03-25 NOTE — PROGRESS NOTES
Patient also follow with ortho [facility], will need to be evaluated by her surgeon or someone in that group.

## 2024-03-25 NOTE — PLAN OF CARE
Problem: Discharge Planning  Goal: Discharge to home or other facility with appropriate resources  Note: From Home with [facility]. Consult placed to case management for D/C planning.      Problem: Pain  Goal: Verbalizes/displays adequate comfort level or baseline comfort level  Outcome: Progressing  Flowsheets (Taken 3/25/2024 0611)  Verbalizes/displays adequate comfort level or baseline comfort level: Assess pain using appropriate pain scale  Note: Complains of pain to left hip and left shoulder.  Repositioned for comfort.  Declines need for pain medication at this time.       Problem: Safety - Adult  Goal: Free from fall injury  Flowsheets (Taken 3/25/2024 0611)  Free From Fall Injury: Instruct family/caregiver on patient safety  Note: Fall precautions in place and bed alarm in use.  Bed in locked and low position.  SR up x3.

## 2024-03-25 NOTE — ED NOTES
Called [facility] to have RN to look over SBAR for transport up to floor      Yajaira Perdomo, RN  03/25/24 1877

## 2024-03-25 NOTE — PROGRESS NOTES
Pharmacist Review and Automatic Dose Adjustment of Prophylactic Enoxaparin    The reviewing pharmacist has made an adjustment to the ordered enoxaparin dose or converted to UFH per the approved [facility] protocol and table as defined below.    Plan / Rationale: Based upon the patient's weight and renal function, the ordered dose of Enoxaparin 40 mg subQ daily has been converted to 30 mg subQ daily.    Please call with questions--  Cindy Storey, PharmD, [facility]  Wireless: [phone]   3/25/2024 9:39 AM        Jen Costa is a 89 y.o. female.     Recent Labs     03/24/24 2155 03/25/24  0532   CREATININE 1.3* 1.1       Estimated Creatinine Clearance: 24 mL/min (based on SCr of 1.1 mg/dL).    Recent Labs     03/24/24 2155 03/25/24  0532   HGB 12.8 12.3   HCT 37.9 36.0   * 110*     No results for input(s): \"INR\" in the last 72 hours.    Height:   Ht Readings from Last 1 Encounters:   03/25/24 1.549 m (5' 1\")     Weight:  Wt Readings from Last 1 Encounters:   03/25/24 53.9 kg (118 lb 13.3 oz)

## 2024-03-25 NOTE — CONSULTS
gabapentin (NEURONTIN) 400 mg, Oral, EVERY BEDTIME    lidocaine 4 % external patch 2 patches, TransDERmal, DAILY    lubiprostone (AMITIZA) 8 mcg, Oral, 2 TIMES DAILY WITH MEALS    OLANZapine (ZYPREXA) 5 mg, Oral, NIGHTLY    omeprazole (PRILOSEC) 40 mg, Oral, 2 times daily    simvastatin (ZOCOR) 20 mg, Oral, NIGHTLY    Synthroid 125 mcg, Oral, DAILY    Vibegron (GEMTESA) 75 MG TABS 1 tablet, Oral, DAILY    vitamin D (ERGOCALCIFEROL) 50,000 Units, Oral, THREE TIMES WEEKLY (MONDAY, WEDNESDAY, FRIDAY)       Please call with questions--  Cindy Storey, PharmD, BCPS  Wireless: [phone]   3/25/2024 2:55 PM

## 2024-03-25 NOTE — ED PROVIDER NOTES
THE [facility]  EMERGENCY DEPARTMENT ENCOUNTER          [facility] RESIDENT NOTE       Date of evaluation: 3/24/2024    Chief Complaint     Dizziness (C/o complaints of syncopal episode and dizziness while talking with a friend. Denies sob/cp. Symptoms have since resolved. Recently started taking a new pain medication for left hip pain. No hx of seizure. )      History of Present Illness     Jen Costa is a 89 y.o. female with a PMHx of AS s/p TAVR, MS and MR, HFpEF, paroxysmal Afib, who presents with dizziness, lightheadedness and left hip pain. She states that while she was talking to her friend she started to feel dizzy and lightheaded which resolved on its own within 5 minutes. Denies losing consciousness, CP, palpitation, SOB or urinary symtoms. She states that this has never happened before.  Her friend is at bedside and states that she actually did pass out for a couple of minutes and took her a couple of minutes to come back to her baseline. She denied any seizure like activity or bowel or bladder incontinence at that time.     Patient states that she had a fall earlier last year and fractured her left hip and got ORIF. She had a long recovery from the surgery but was able to walk with rehab. 5 weeks ago when she got into her car, she felt something and ended up having a left gluteus filiberto tear. She was having pain with it but resolved with pain medication. Last night, while she was sleeping she started having pain in her back radiating to the left leg. She was unable to describe the pain but rated the pain 8/10. She tried ibuprofen, meloxicam with no improvement.     ASSESSMENT / PLAN  (MEDICAL DECISION MAKING)     INITIAL VITALS: BP: (!) 92/47, Temp: 97.4 °F (36.3 °C), Pulse: 77, Respirations: 20, SpO2: 99 %     Jen Costa is a 89 y.o. female with a PMHx of AS s/p TAVR, MS and MR, HFpEF, paroxysmal Afib presents with dizziness and lightheadedness while she was talking to her friend. It lasted

## 2024-03-25 NOTE — ED TRIAGE NOTES
C/o complaints of syncopal episode and dizziness while talking with a friend. Denies sob/cp. Symptoms have since resolved. Recently started taking a new pain medication for left hip pain. No hx of seizure.

## 2024-03-26 ENCOUNTER — APPOINTMENT (OUTPATIENT)
Dept: CT IMAGING | Age: 89
DRG: 312 | End: 2024-03-26
Payer: MEDICARE

## 2024-03-26 ENCOUNTER — APPOINTMENT (OUTPATIENT)
Dept: GENERAL RADIOLOGY | Age: 89
DRG: 312 | End: 2024-03-26
Payer: MEDICARE

## 2024-03-26 LAB
ALBUMIN SERPL-MCNC: 3.3 G/DL (ref 3.4–5)
ALBUMIN/GLOB SERPL: 1.3 {RATIO} (ref 1.1–2.2)
ALP SERPL-CCNC: 66 U/L (ref 40–129)
ALT SERPL-CCNC: 6 U/L (ref 10–40)
ANION GAP SERPL CALCULATED.3IONS-SCNC: 10 MMOL/L (ref 3–16)
AST SERPL-CCNC: 14 U/L (ref 15–37)
BASOPHILS # BLD: 0 K/UL (ref 0–0.2)
BASOPHILS NFR BLD: 0.5 %
BILIRUB SERPL-MCNC: 0.4 MG/DL (ref 0–1)
BUN SERPL-MCNC: 15 MG/DL (ref 7–20)
CALCIUM SERPL-MCNC: 8.8 MG/DL (ref 8.3–10.6)
CHLORIDE SERPL-SCNC: 108 MMOL/L (ref 99–110)
CO2 SERPL-SCNC: 22 MMOL/L (ref 21–32)
CREAT SERPL-MCNC: 0.9 MG/DL (ref 0.6–1.2)
DEPRECATED RDW RBC AUTO: 14.4 % (ref 12.4–15.4)
EOSINOPHIL # BLD: 0 K/UL (ref 0–0.6)
EOSINOPHIL NFR BLD: 0 %
GFR SERPLBLD CREATININE-BSD FMLA CKD-EPI: 59 ML/MIN/{1.73_M2}
GLUCOSE SERPL-MCNC: 96 MG/DL (ref 70–99)
HCT VFR BLD AUTO: 34.5 % (ref 36–48)
HGB BLD-MCNC: 11.7 G/DL (ref 12–16)
LYMPHOCYTES # BLD: 0.7 K/UL (ref 1–5.1)
LYMPHOCYTES NFR BLD: 15.4 %
MCH RBC QN AUTO: 31.3 PG (ref 26–34)
MCHC RBC AUTO-ENTMCNC: 33.9 G/DL (ref 31–36)
MCV RBC AUTO: 92.4 FL (ref 80–100)
MONOCYTES # BLD: 0.5 K/UL (ref 0–1.3)
MONOCYTES NFR BLD: 10.6 %
NEUTROPHILS # BLD: 3.2 K/UL (ref 1.7–7.7)
NEUTROPHILS NFR BLD: 73.5 %
PLATELET # BLD AUTO: 110 K/UL (ref 135–450)
PMV BLD AUTO: 7.9 FL (ref 5–10.5)
POTASSIUM SERPL-SCNC: 4.1 MMOL/L (ref 3.5–5.1)
PROT SERPL-MCNC: 5.9 G/DL (ref 6.4–8.2)
RBC # BLD AUTO: 3.73 M/UL (ref 4–5.2)
SODIUM SERPL-SCNC: 140 MMOL/L (ref 136–145)
WBC # BLD AUTO: 4.4 K/UL (ref 4–11)

## 2024-03-26 PROCEDURE — 36415 COLL VENOUS BLD VENIPUNCTURE: CPT

## 2024-03-26 PROCEDURE — 73700 CT LOWER EXTREMITY W/O DYE: CPT

## 2024-03-26 PROCEDURE — 85025 COMPLETE CBC W/AUTO DIFF WBC: CPT

## 2024-03-26 PROCEDURE — 2580000003 HC RX 258

## 2024-03-26 PROCEDURE — 97535 SELF CARE MNGMENT TRAINING: CPT

## 2024-03-26 PROCEDURE — 97166 OT EVAL MOD COMPLEX 45 MIN: CPT

## 2024-03-26 PROCEDURE — 73502 X-RAY EXAM HIP UNI 2-3 VIEWS: CPT

## 2024-03-26 PROCEDURE — 6370000000 HC RX 637 (ALT 250 FOR IP)

## 2024-03-26 PROCEDURE — 2060000000 HC ICU INTERMEDIATE R&B

## 2024-03-26 PROCEDURE — 99223 1ST HOSP IP/OBS HIGH 75: CPT | Performed by: INTERNAL MEDICINE

## 2024-03-26 PROCEDURE — 6360000002 HC RX W HCPCS

## 2024-03-26 PROCEDURE — 97530 THERAPEUTIC ACTIVITIES: CPT

## 2024-03-26 PROCEDURE — 80053 COMPREHEN METABOLIC PANEL: CPT

## 2024-03-26 PROCEDURE — 97116 GAIT TRAINING THERAPY: CPT

## 2024-03-26 PROCEDURE — 97162 PT EVAL MOD COMPLEX 30 MIN: CPT

## 2024-03-26 RX ORDER — METOPROLOL SUCCINATE 25 MG/1
12.5 TABLET, EXTENDED RELEASE ORAL DAILY
Status: DISCONTINUED | OUTPATIENT
Start: 2024-03-26 | End: 2024-03-27 | Stop reason: HOSPADM

## 2024-03-26 RX ORDER — GABAPENTIN 100 MG/1
100 CAPSULE ORAL ONCE
Status: COMPLETED | OUTPATIENT
Start: 2024-03-26 | End: 2024-03-26

## 2024-03-26 RX ORDER — LAPATINIB 250 MG/1
500 TABLET ORAL DAILY
COMMUNITY
Start: 2023-09-26

## 2024-03-26 RX ORDER — FUROSEMIDE 40 MG/1
TABLET ORAL SEE ADMIN INSTRUCTIONS
Status: ON HOLD | COMMUNITY
Start: 2023-09-06 | End: 2024-03-27 | Stop reason: HOSPADM

## 2024-03-26 RX ORDER — QUETIAPINE FUMARATE 25 MG/1
25 TABLET, FILM COATED ORAL NIGHTLY
COMMUNITY

## 2024-03-26 RX ORDER — MIRTAZAPINE 45 MG/1
45 TABLET, FILM COATED ORAL NIGHTLY
COMMUNITY

## 2024-03-26 RX ORDER — EXEMESTANE 25 MG/1
25 TABLET ORAL DAILY
COMMUNITY
Start: 2023-11-16

## 2024-03-26 RX ADMIN — SODIUM CHLORIDE, POTASSIUM CHLORIDE, SODIUM LACTATE AND CALCIUM CHLORIDE: 600; 310; 30; 20 INJECTION, SOLUTION INTRAVENOUS at 07:44

## 2024-03-26 RX ADMIN — LEVOTHYROXINE SODIUM 112 MCG: 0.11 TABLET ORAL at 07:41

## 2024-03-26 RX ADMIN — OLANZAPINE 5 MG: 5 TABLET, FILM COATED ORAL at 21:48

## 2024-03-26 RX ADMIN — ASPIRIN 81 MG: 81 TABLET, COATED ORAL at 07:55

## 2024-03-26 RX ADMIN — ENOXAPARIN SODIUM 30 MG: 100 INJECTION SUBCUTANEOUS at 07:56

## 2024-03-26 RX ADMIN — GABAPENTIN 100 MG: 100 CAPSULE ORAL at 22:23

## 2024-03-26 RX ADMIN — ATORVASTATIN CALCIUM 10 MG: 10 TABLET, FILM COATED ORAL at 07:55

## 2024-03-26 RX ADMIN — PANTOPRAZOLE SODIUM 40 MG: 40 TABLET, DELAYED RELEASE ORAL at 07:41

## 2024-03-26 RX ADMIN — SODIUM CHLORIDE, PRESERVATIVE FREE 10 ML: 5 INJECTION INTRAVENOUS at 22:23

## 2024-03-26 ASSESSMENT — PAIN SCALES - GENERAL
PAINLEVEL_OUTOF10: 0

## 2024-03-26 NOTE — PROGRESS NOTES
Occupational Therapy  Facility/Department: [address]  Occupational Therapy Initial Assessment/Treatment    Name: Jen Costa  : 1930  MRN: [id number]  Date of Service: 3/26/2024    Discharge Recommendations:  24 hour supervision or assist  OT Equipment Recommendations  Equipment Needed: No       Patient Diagnosis(es): The primary encounter diagnosis was ANASTASIYA (acute kidney injury) (HCC). A diagnosis of Syncope and collapse was also pertinent to this visit.  Past Medical History:  has a past medical history of Cancer (HCC), Hepatitis, Hyperlipidemia, and Osteoarthritis.  Past Surgical History:  has a past surgical history that includes Mastectomy; Breast lumpectomy;  section; and hip surgery (Left, 2023).    Treatment Diagnosis: Impaired ADL and functional mobility      Assessment   Performance deficits / Impairments: Decreased functional mobility ;Decreased ADL status;Decreased endurance;Decreased strength;Decreased ROM  Assessment: Pt presents with a decline in functional independence.  Pt is from home and has HHA for 4 hours 5x a week.  HHA primarily assists with ADL and transportation.  Per pt, she manages dressing independently when HHA is not on duty.  Currently, pt req SBA for functional mobility and max assist for lower body ADL.  Pt would like to go home at discharge and states that she could increase her HHA coverage.  Treatment Diagnosis: Impaired ADL and functional mobility  Prognosis: Good  Decision Making: Medium Complexity  REQUIRES OT FOLLOW-UP: Yes  Activity Tolerance  Activity Tolerance: Patient Tolerated treatment well        Plan   Occupational Therapy Plan  Times Per Week: 2-5  Current Treatment Recommendations: Strengthening, ROM, Balance training, Functional mobility training, Endurance training, Self-Care / ADL     Restrictions  Position Activity Restriction  Other position/activity restrictions: up with assistance    Subjective   General  Chart Reviewed: Yes  Patient

## 2024-03-26 NOTE — CONSULTS
Cardiology Consultation                                                                    Pt Name: Jen Costa  Age: 89 y.o.  Sex: female  : 1930  Location: [facility]7/4317-01    Referring Physician: Venkat Soler MD  Primary cardiologist: Jordy Gill MD       Reason for Consult:     Reason for Consultation/Chief Complaint: Severe mitral stenosis, anular calcification, syncopal episodes     HPI:      Jen Costa is a 89 y.o. female with a past medical history of Aortic stenosis S/P TAVR bioprosthetic aortic valve 2023, PAF, Mitral stenosis, HFpEF, hypothyroidism, Hx of breast cancer S/P Mastectomy  presented to the [facility] ED due to a syncopal episode on . In the ED CXR revealed Mild hazy opacities, Troponin 22-19, .  1-L NS bolus was given and was admitted to the hospital for syncope. A 2-D ECHO was done on admission which revealed EF > 70 %, Grade II Diastolic dysfunction, Severe mitral stenosis. Cardiology was consulted due to severe mitral stenosis.     On my evaluation she told me that two day ago she felt lightheaded and felt she would pass out while having dinner and her friend called EMS, She felt that she never lost consciousness but felt dizzy. She denied nay biting of tongue or urinary incontinence. Today her symptoms have improved and doesn't feel lightheaded or dizzy.     Cardiology office note and imaging reviewed from 2023 showing Seveare mitral stenosis.     Histories     Past Medical History:   has a past medical history of Cancer (HCC), Hepatitis, Hyperlipidemia, and Osteoarthritis.    Surgical History:   has a past surgical history that includes Mastectomy; Breast lumpectomy;  section; and hip surgery (Left, 2023).     Social History:   reports that she has quit smoking. She has never used smokeless tobacco. She reports current alcohol use of about 1.0 standard drink of alcohol per week. She reports that she does not use drugs.     Family History:  No evidence  ondansetron **OR** ondansetron, polyethylene glycol, perflutren lipid microspheres    Allergy:     Patient has no known allergies.       Review of Systems:     All 12 point review of symptoms completed. Pertinent positives identified in the HPI, all other review of symptoms negative as below.    CONSTITUTIONAL: No fatigue  SKIN: No rash or pruritis.  ENT: No Headaches, hearing loss or vertigo. No mouth sores or sore throat.  CARDIOVASCULAR: No chest pain/chest pressure/chest discomfort. No palpitations. No edema.   RESPIRATORY: No dyspnea. No cough or wheezing, no sputum production.  GASTROINTESTINAL: No N/V/D. No abdominal pain, appetite loss, blood in stools.  GENITOURINARY: No dysuria, trouble voiding, or hematuria.  MUSCULOSKELETAL:  No gait disturbance, weakness or joint complaints.  NEUROLOGICAL: No headache, diplopia, change in muscle strength, numbness or tingling. No change in gait, balance, coordination, mood, affect, memory, mentation, behavior.  ENDOCRINE: No excessive thirst, fluid intake, or urination. No tremor.  HEMATOLOGIC: No abnormal bruising or bleeding.  ALLERGY: No nasal congestion or hives.      Physical Examination:     Vitals:    03/26/24 0330 03/26/24 0334 03/26/24 0753 03/26/24 1149   BP: 119/72  112/65 115/62   Pulse: 83  78 77   Resp: 18  17 18   Temp: 97.9 °F (36.6 °C)  97.6 °F (36.4 °C) 97.6 °F (36.4 °C)   TempSrc: Oral  Oral Oral   SpO2: 92%  95% 98%   Weight:  53.9 kg (118 lb 13.3 oz)     Height:           Wt Readings from Last 3 Encounters:   03/26/24 53.9 kg (118 lb 13.3 oz)   01/23/23 75.8 kg (167 lb 2 oz)   08/29/19 62.1 kg (136 lb 12.8 oz)         General Appearance:  Alert, cooperative, no distress, appears stated age, appropriate weight   Head:  Normocephalic, without obvious abnormality, atraumatic   Neck: Supple, symmetrical, trachea midline, no adenopathy, thyroid: not enlarged, symmetric, no tenderness/mass/nodules, no carotid bruit.        Lungs:   Respirations unlabored,

## 2024-03-26 NOTE — PROGRESS NOTES
Internal Medicine Progress Note    Date: 3/26/2024   Patient: Jen Costa   [facility] Day: 1      CC: Dizziness (C/o complaints of syncopal episode and dizziness while talking with a friend. Denies sob/cp. Symptoms have since resolved. Recently started taking a new pain medication for left hip pain. No hx of seizure. )       Interval Hx   No events overnight. AFVSS.    Patient seen and examined this AM, patient with some confusion regarding her hip. Stated she was not informed of hematoma being present, was generally confused when we were going over plan of care discussions but remained oriented. Reports limited pain this AM but states she \"hasn't moved around enough to know if I'm in pain yet\".      HPI:     \"89-year-old female with PMH of breast carcinoma, HLD, osteoarthritis, left gluteus filiberto hematoma presented with an episode of dizziness.     According to the patient, she was in her usual state of health till today.  She was having dinner in the kitchen table, when she experienced blurry vision, felt \"dizzy\".  Patient denies any loss of consciousness, fall.  However, friend reports that patient did lose consciousness for 4 minutes, and was out of it.  No diaphoresis, palpitations, chest pain, tonic-clonic movements observed.  There has been, the patient's friend called EMS.  Once EMS arrived, patient gained consciousness.  Patient denies any recent falls, prior episodes of syncope.  She did report that she feels lightheaded occasionally when she gets up in the morning, gets out of bed.  She has decreased p.o. intake, hardly eats meals because of loss of appetite since last year when she underwent TAVR/started experiencing hip pain.  She had lost 15 pounds since last year.  She only drinks minimum water with her medications.  Patient lives independently at home, she does her ADLs herself but tries to find shortcuts (gets food made).      Patient has a history of left hip cephalomedullary nail in

## 2024-03-26 NOTE — ACP (ADVANCE CARE PLANNING)
ADVANCED CARE PLANNING    Name:Jen Costa       :  1930              MRN:  [id number]      Purpose of Encounter: Advanced care planning in light of admission for syncope, advanced age      Parties in attendance: :Bassem Garcia MD, Family members:son and daughter in Law (by phone)  Decisional Capacity:Yes    Diagnosis:     Syncopal episode     ANASTASIYA     Recurrent breast cancer  S/P left mastectomy and right lumpectomy and radiation in .      Paroxysmal Afib, and sec hypercoag state sec to Afib     Acute on chronic back pain, sciatica with Hx of Tear of left gluteus filiberto muscle     S/p TAVR 2024     Severe mitral stenosis.      Old RBBB, LAFB     Chronic diastolic heart failure related to her valvular heart disease     H/O DVT:  Complicating her breast cancer treatments.      Left Hip Fracture:  S/P ORIF. Early .        Patients Medical Story:89-year-old female with Hx  of recurrent breast carcinoma, HLD, osteoarthritis, left gluteus filiberto hematoma presented with a syncopal  episode      Goals of Care Determinations: Patient wishes to focus on recovery from current illness and return to her home.     Plan: Will notify Hola Mattson of change in care plan. Will look at further interventions as needed.   Code Status: At this time patient wishes to be Limited  Time Spent with Patient: 17 minutes      Electronically signed by Bassem Villalobos MD on 3/25/2024 at 8:01 PM  Thank you Hola Mattson for the opportunity to be involved in this patient's care.

## 2024-03-26 NOTE — PROGRESS NOTES
[facility] - Acute Rehab Unit   After review, this patient is felt to be:       []  Dr. Ocasio states this patient is appropriate for rehab.     []  Not appropriate for Acute Inpatient Rehab    [x]  Referral received and ARU reviewing patient      Pt reports that she is refusing rehab and would like to discharge home with [facility]. CM aware.   Will notify CM/SW with further updates. Thank you for the referral.    Elise TORRES, OTR/L  Clinical Liaison- [facility]   (P): [phone]  (F): [phone]

## 2024-03-26 NOTE — PROGRESS NOTES
Physical Therapy  [address]  Physical Therapy Initial Assessment/Treatment    Name: Jen Costa  : 1930  MRN: [id number]  Date of Service: 3/26/2024    Discharge Recommendations:  Outpatient PT, 24 hour supervision or assist   PT Equipment Recommendations  Equipment Needed: No  Other: pt owns RW      Patient Diagnosis(es): The primary encounter diagnosis was ANASTASIYA (acute kidney injury) (HCC). A diagnosis of Syncope and collapse was also pertinent to this visit.  Past Medical History:  has a past medical history of Cancer (HCC), Hepatitis, Hyperlipidemia, and Osteoarthritis.  Past Surgical History:  has a past surgical history that includes Mastectomy; Breast lumpectomy;  section; and hip surgery (Left, 2023).    Assessment   Assessment: pt presents from home alone where she is typically IND without AD but has been using RW leading to admission 2/2 pain in L glute. Pt denies pain at rest or with mobility this session and is able to transfer and ambulate up to 130ft with RW and SBA. Pt appears steady with use of RW with no LOB noted, c/o fatigue with ambulation \"I need to walk more.\" Pt has private duty aid M-F and can increase services as needed. PT rec home with initial 24hr A, use of RW for mobility and resume OP PT.  Treatment Diagnosis: gait difficulty  Therapy Prognosis: Good  Decision Making: Medium Complexity  Requires PT Follow-Up: Yes  Activity Tolerance  Activity Tolerance: Patient tolerated evaluation without incident;Patient tolerated treatment well;Patient limited by fatigue     Plan   Physical Therapy Plan  General Plan:  (2-5)  Current Treatment Recommendations: Strengthening, Balance training, Endurance training, Functional mobility training, Gait training, Transfer training, Stair training, Therapeutic activities, Safety education & training, Patient/Caregiver education & training  Safety Devices  Type of Devices: Left in chair, Nurse notified, Call light

## 2024-03-26 NOTE — CONSULTS
Clinical Pharmacy Consult Note  Medication History     Admit Date: 3/24/2024    Pharmacy consulted to verify home medication list by Dr. Freeman.    List of of current medications patient is taking is complete. Home Medication list in EPIC updated to reflect changes noted below.    Source of information: Rx dispense history & phone interview with daughter-in-lawCindy    Changes made to medication list:   Medications removed: (include reason, ex: therapy completed, patient no longer taking, etc.)  Amiodarone  Prolia  Fesoterodine  Lidocaine patches  Lubiprostone  Olanzapine  Simvastatin   Gemtasa  Medications added:   Exemestane 25mg po daily  Furosemide 40mg in AM / 20mg in PM  Lapatinib 500mg po daily   Mirtazapine 45mg nightly   Quetiapine 25mg nightly   Ergocalciferol - 3x weekly (MWF)  Medication doses adjusted:   Levothyroxine-->125mcg po daily  Omeprazole-->40mg po BID  Gabapentin-->400mg nightly       Current Outpatient Medications   Medication Instructions    aspirin 81 mg, Oral, DAILY    exemestane (AROMASIN) 25 mg, Oral, DAILY    furosemide (LASIX) 40 MG tablet Oral, SEE ADMIN INSTRUCTIONS, Furosemide 40mg in AM / 20mg in PM    gabapentin (NEURONTIN) 400 mg, Oral, EVERY BEDTIME    lapatinib (TYKERB) 500 mg, Oral, DAILY    mirtazapine (REMERON) 45 mg, Oral, NIGHTLY    omeprazole (PRILOSEC) 40 mg, Oral, 2 times daily    QUEtiapine (SEROQUEL) 25 mg, Oral, Nightly    Synthroid 125 mcg, Oral, DAILY    vitamin D (ERGOCALCIFEROL) 50,000 Units, Oral, THREE TIMES WEEKLY (MONDAY, WEDNESDAY, FRIDAY)       Please call with questions--  Cindy Storey, PharmD, BCPS  Wireless: [phone]   3/26/2024 8:49 AM

## 2024-03-26 NOTE — PLAN OF CARE
Problem: Discharge Planning  Goal: Discharge to home or other facility with appropriate resources  Outcome: Progressing  Flowsheets (Taken 3/26/2024 1013)  Discharge to home or other facility with appropriate resources: Identify barriers to discharge with patient and caregiver     Problem: Pain  Goal: Verbalizes/displays adequate comfort level or baseline comfort level  Outcome: Progressing  Flowsheets (Taken 3/25/2024 0611 by Evelio Barros, RN)  Verbalizes/displays adequate comfort level or baseline comfort level: Assess pain using appropriate pain scale     Problem: Safety - Adult  Goal: Free from fall injury  Outcome: Progressing  Flowsheets (Taken 3/25/2024 0611 by Evelio Barros, RN)  Free From Fall Injury: Instruct family/caregiver on patient safety      "HPI and Plan:   See dictation    Orders Placed This Encounter   Procedures     Follow-Up with Cardiac Advanced Practice Provider     Orders Placed This Encounter   Medications     diphenhydrAMINE-acetaminophen (TYLENOL PM)  MG tablet     Sig: Take 1 tablet by mouth nightly as needed for sleep     pravastatin (PRAVACHOL) 20 MG tablet     Sig: Take 1 tablet (20 mg) by mouth once a week     Dispense:  30 tablet     Refill:  1     Medications Discontinued During This Encounter   Medication Reason     pravastatin (PRAVACHOL) 80 MG tablet Stopped by Patient         Encounter Diagnoses   Name Primary?     Acute diastolic congestive heart failure (H)      Essential hypertension, benign      Cardiovascular disease Yes     PVD (peripheral vascular disease) (H)      Coronary artery disease involving native coronary artery of native heart without angina pectoris        CURRENT MEDICATIONS:  Current Outpatient Prescriptions   Medication Sig Dispense Refill     allopurinol (ZYLOPRIM) 100 MG tablet TAKE 2 TABLETS(200 MG) BY MOUTH DAILY 180 tablet 3     aspirin 81 MG tablet Take 1 tablet by mouth daily. with food       blood glucose monitoring (ACCU-CHEK COMPACT PLUS) test strip Check blood sugar 2-3 times a day 200 strip 3     cetirizine (ZYRTEC) 10 MG tablet TAKE 1 TABLET(10 MG) BY MOUTH EVERY EVENING 30 tablet 8     clopidogrel (PLAVIX) 75 MG tablet TAKE 1 TABLET(75 MG) BY MOUTH DAILY 90 tablet 3     COD LIVER OIL PO Take 1 capsule by mouth daily        Cyanocobalamin (VITAMIN B 12 PO) Take 500 mcg by mouth 2 times daily.       diphenhydrAMINE-acetaminophen (TYLENOL PM)  MG tablet Take 1 tablet by mouth nightly as needed for sleep       hydrALAZINE (APRESOLINE) 25 MG tablet Take 75 mg by mouth 3 times daily 360 tablet 3     insulin NPH (HUMULIN N, NOVOLIN N) 100 UNIT/ML VIAL 18 units at bedtime 3 Month 3     insulin syringe-needle U-100 (BD INSULIN SYRINGE) 29G X 1/2\" 0.5 ML Use one syringe 2 daily or as directed. " 200 each 4     isosorbide mononitrate (IMDUR) 60 MG 24 hr tablet Take 1 tablet (60 mg) by mouth daily 90 tablet 3     levothyroxine (SYNTHROID/LEVOTHROID) 50 MCG tablet TAKE 1 TABLET BY MOUTH DAILY 30 tablet 0     metoprolol tartrate (LOPRESSOR) 50 MG tablet Take 1 tablet (50 mg) by mouth 2 times daily 60 tablet 0     pravastatin (PRAVACHOL) 20 MG tablet Take 1 tablet (20 mg) by mouth once a week 30 tablet 1     theophylline (UNIPHYL) 400 MG 24 hr tablet TAKE 1 TABLET BY MOUTH EVERY DAY 90 tablet 3     triamcinolone (KENALOG) 0.5 % cream Apply sparingly to affected area two  times daily as needed for rash. 45 g 1     [DISCONTINUED] pravastatin (PRAVACHOL) 80 MG tablet Take 0.5 tablets (40 mg) by mouth every other day at bedtime. (Patient not taking: Reported on 5/3/2018) 90 tablet 1       ALLERGIES     Allergies   Allergen Reactions     Advair [Fluticasone-Salmeterol]      cough;  insomnia, nightmares     Amlodipine Besylate      edema       Azithromycin GI Disturbance     Clarithromycin      gi     Hydrochlorothiazide      hctz + lisinopril-->inc creat, k+     Lisinopril      lisinopril + hctz --> inc creat, k+     Moxifloxacin Hydrochloride      clostridium diffile colitis     Penicillins      gen swelling     Pravastatin Cramps     Muscle cramps/spasm.  Stopped 2017     Vioxx      edema       PAST MEDICAL HISTORY:  Past Medical History:   Diagnosis Date     Acute myocardial infarction, unspecified site, episode of care unspecified      Allergic rhinitis, cause unspecified      Anemia 3/6/2014     CAD (coronary artery disease)     1996 CABG - LIMA to LAD, SVG to OM, 2007 Cath - KANU to Left main and ostial/prox LAD, 2012 Cath - 80-90% stenosis SVG to OM - KANU placed, EF 50%     CKD (chronic kidney disease) stage 3, GFR 30-59 ml/min 3/9/2014     CTS (carpal tunnel syndrome)     bilateral     Degeneration of cervical intervertebral disc      Diaphragmatic hernia without mention of obstruction or gangrene       Esophageal reflux      Essential hypertension, benign      Hyperlipidemia LDL goal <70      Hypothyroidism      Hypothyroidism, unspecified hypothyroidism type 1/14/2016     IDIOPATHIC CYCLIC EDEMA      Mild persistent asthma      Osteoarthrosis, unspecified whether generalized or localized, unspecified site      Pneumonia, organism unspecified(486)      Proteinuria 5/17/2014     PVD (peripheral vascular disease) (H)      Sensorineural hearing loss, unspecified      Subarachnoid bleed (H)      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) 10/17/2015     Unspecified disorder resulting from impaired renal function      Unspecified hereditary and idiopathic peripheral neuropathy      Venous insufficiency        PAST SURGICAL HISTORY:  Past Surgical History:   Procedure Laterality Date     C NONSPECIFIC PROCEDURE      appendectomy     C NONSPECIFIC PROCEDURE      hemorrhoids     C NONSPECIFIC PROCEDURE      cervical strain     C NONSPECIFIC PROCEDURE      rotator cuff surgery, right shoulder     C NONSPECIFIC PROCEDURE  2008    iol os     CORONARY ARTERY BYPASS  1996    LIMA to LAD, SVG to OM     HEART CATH STENT COR W/WO PTCA  2007    lad, left main cor artery stents/drug eluting     HEART CATH STENT COR W/WO PTCA  2012    80-90% stenosis SVG to OM - KANU placed, EF 50%     NONSPECIFIC PROCEDURE      iol od       FAMILY HISTORY:  Family History   Problem Relation Age of Onset     Lung Cancer Daughter      Family History Negative Mother      broken hip     Jaundice Father      Alcohol/Drug Father      Ovarian Cancer Daughter      Family History Negative Daughter      Family History Negative Son        SOCIAL HISTORY:  Social History     Social History     Marital status:      Spouse name: N/A     Number of children: N/A     Years of education: N/A     Social History Main Topics     Smoking status: Former Smoker     Packs/day: 1.00     Years: 14.00     Types: Cigarettes     Start date:  1952     Quit date: 1966     Smokeless tobacco: Never Used     Alcohol use No     Drug use: No     Sexual activity: No     Other Topics Concern     Caffeine Concern No     Sleep Concern Yes     Stress Concern No     Weight Concern No     Special Diet No     Exercise Yes     bowling, 5 days week. yard work     Social History Narrative       Review of Systems:  Skin:  Positive for bruising   Eyes:  Negative    ENT:  Negative    Respiratory:  Positive for    Cardiovascular:  Negative;syncope or near-syncope;exercise intolerance;cyanosis;lightheadedness;dizziness Positive for;edema  Gastroenterology: Positive for constipation  Genitourinary:  Positive for urinary frequency  Musculoskeletal:  Positive for joint pain  Neurologic:  Positive for numbness or tingling of hands  Psychiatric:  Negative    Heme/Lymph/Imm:  Positive for easy bruising  Endocrine:  Positive for diabetes;thyroid disorder    Physical Exam:  Vitals: /68  Pulse 56  Ht 1.829 m (6')  Wt 95 kg (209 lb 6.4 oz)  BMI 28.4 kg/m2    Constitutional:           Skin:             Head:           Eyes:           Lymph:      ENT:           Neck:           Respiratory:            Cardiac:                                                           GI:           Extremities and Muscular Skeletal:                 Neurological:           Psych:           Recent Lab Results:  LIPID RESULTS:  Lab Results   Component Value Date    CHOL 167 03/14/2018    HDL 32 (L) 03/14/2018     (H) 03/14/2018    TRIG 99 03/14/2018    CHOLHDLRATIO 3.8 09/11/2014       LIVER ENZYME RESULTS:  Lab Results   Component Value Date    AST 3 12/13/2017    ALT 16 12/13/2017       CBC RESULTS:  Lab Results   Component Value Date    WBC 8.7 03/16/2018    RBC 3.24 (L) 03/16/2018    HGB 9.9 (L) 03/16/2018    HCT 29.7 (L) 03/16/2018    MCV 92 03/16/2018    MCH 30.6 03/16/2018    MCHC 33.3 03/16/2018    RDW 14.9 03/16/2018     03/16/2018       BMP RESULTS:  Lab Results   Component  Value Date     04/05/2018    POTASSIUM 4.2 04/05/2018    CHLORIDE 111 (H) 04/05/2018    CO2 25 04/05/2018    ANIONGAP 6 04/05/2018     (H) 04/05/2018    BUN 55 (H) 04/05/2018    CR 3.09 (H) 04/05/2018    GFRESTIMATED 19 (L) 04/05/2018    GFRESTBLACK 23 (L) 04/05/2018    LEV 9.0 04/05/2018        A1C RESULTS:  Lab Results   Component Value Date    A1C 6.3 (H) 03/13/2018       INR RESULTS:  Lab Results   Component Value Date    INR 1.06 03/12/2013    INR 1.19 (H) 12/08/2008           CC  Sebastien Gibson MD  [address]  [place] [id number]

## 2024-03-27 VITALS
BODY MASS INDEX: 22.43 KG/M2 | HEART RATE: 88 BPM | TEMPERATURE: 97.5 F | WEIGHT: 118.83 LBS | OXYGEN SATURATION: 96 % | SYSTOLIC BLOOD PRESSURE: 100 MMHG | RESPIRATION RATE: 18 BRPM | HEIGHT: 61 IN | DIASTOLIC BLOOD PRESSURE: 62 MMHG

## 2024-03-27 LAB
ALBUMIN SERPL-MCNC: 3.3 G/DL (ref 3.4–5)
ALBUMIN/GLOB SERPL: 1.2 {RATIO} (ref 1.1–2.2)
ALP SERPL-CCNC: 67 U/L (ref 40–129)
ALT SERPL-CCNC: 6 U/L (ref 10–40)
ANION GAP SERPL CALCULATED.3IONS-SCNC: 9 MMOL/L (ref 3–16)
AST SERPL-CCNC: 13 U/L (ref 15–37)
BASOPHILS # BLD: 0 K/UL (ref 0–0.2)
BASOPHILS NFR BLD: 0.7 %
BILIRUB SERPL-MCNC: 0.6 MG/DL (ref 0–1)
BUN SERPL-MCNC: 14 MG/DL (ref 7–20)
CALCIUM SERPL-MCNC: 8.9 MG/DL (ref 8.3–10.6)
CHLORIDE SERPL-SCNC: 108 MMOL/L (ref 99–110)
CO2 SERPL-SCNC: 21 MMOL/L (ref 21–32)
CREAT SERPL-MCNC: 0.7 MG/DL (ref 0.6–1.2)
DEPRECATED RDW RBC AUTO: 14.2 % (ref 12.4–15.4)
EOSINOPHIL # BLD: 0 K/UL (ref 0–0.6)
EOSINOPHIL NFR BLD: 0 %
GFR SERPLBLD CREATININE-BSD FMLA CKD-EPI: 80 ML/MIN/{1.73_M2}
GLUCOSE SERPL-MCNC: 91 MG/DL (ref 70–99)
HCT VFR BLD AUTO: 35.9 % (ref 36–48)
HGB BLD-MCNC: 11.9 G/DL (ref 12–16)
LYMPHOCYTES # BLD: 0.8 K/UL (ref 1–5.1)
LYMPHOCYTES NFR BLD: 16.9 %
MAGNESIUM SERPL-MCNC: 1.9 MG/DL (ref 1.8–2.4)
MCH RBC QN AUTO: 30.7 PG (ref 26–34)
MCHC RBC AUTO-ENTMCNC: 33.3 G/DL (ref 31–36)
MCV RBC AUTO: 92.3 FL (ref 80–100)
MONOCYTES # BLD: 0.5 K/UL (ref 0–1.3)
MONOCYTES NFR BLD: 10.6 %
NEUTROPHILS # BLD: 3.5 K/UL (ref 1.7–7.7)
NEUTROPHILS NFR BLD: 71.8 %
PLATELET # BLD AUTO: 119 K/UL (ref 135–450)
PMV BLD AUTO: 7.5 FL (ref 5–10.5)
POTASSIUM SERPL-SCNC: 4.4 MMOL/L (ref 3.5–5.1)
PROT SERPL-MCNC: 6.1 G/DL (ref 6.4–8.2)
RBC # BLD AUTO: 3.89 M/UL (ref 4–5.2)
SODIUM SERPL-SCNC: 138 MMOL/L (ref 136–145)
WBC # BLD AUTO: 4.8 K/UL (ref 4–11)

## 2024-03-27 PROCEDURE — 36415 COLL VENOUS BLD VENIPUNCTURE: CPT

## 2024-03-27 PROCEDURE — 83735 ASSAY OF MAGNESIUM: CPT

## 2024-03-27 PROCEDURE — 80053 COMPREHEN METABOLIC PANEL: CPT

## 2024-03-27 PROCEDURE — 6370000000 HC RX 637 (ALT 250 FOR IP): Performed by: INTERNAL MEDICINE

## 2024-03-27 PROCEDURE — 6370000000 HC RX 637 (ALT 250 FOR IP)

## 2024-03-27 PROCEDURE — 2580000003 HC RX 258

## 2024-03-27 PROCEDURE — 85025 COMPLETE CBC W/AUTO DIFF WBC: CPT

## 2024-03-27 PROCEDURE — 6360000002 HC RX W HCPCS

## 2024-03-27 RX ORDER — METOPROLOL SUCCINATE 25 MG/1
12.5 TABLET, EXTENDED RELEASE ORAL DAILY
Qty: 30 TABLET | Refills: 3 | Status: SHIPPED | OUTPATIENT
Start: 2024-03-27

## 2024-03-27 RX ORDER — ENOXAPARIN SODIUM 100 MG/ML
40 INJECTION SUBCUTANEOUS DAILY
Status: DISCONTINUED | OUTPATIENT
Start: 2024-03-28 | End: 2024-03-27 | Stop reason: HOSPADM

## 2024-03-27 RX ORDER — SIMVASTATIN 20 MG
20 TABLET ORAL NIGHTLY
Qty: 30 TABLET | Refills: 3 | Status: SHIPPED | OUTPATIENT
Start: 2024-03-27

## 2024-03-27 RX ADMIN — ASPIRIN 81 MG: 81 TABLET, COATED ORAL at 09:27

## 2024-03-27 RX ADMIN — ACETAMINOPHEN 1000 MG: 500 TABLET ORAL at 12:33

## 2024-03-27 RX ADMIN — LEVOTHYROXINE SODIUM 112 MCG: 0.11 TABLET ORAL at 07:56

## 2024-03-27 RX ADMIN — ATORVASTATIN CALCIUM 10 MG: 10 TABLET, FILM COATED ORAL at 09:27

## 2024-03-27 RX ADMIN — SODIUM CHLORIDE, PRESERVATIVE FREE 10 ML: 5 INJECTION INTRAVENOUS at 12:33

## 2024-03-27 RX ADMIN — ENOXAPARIN SODIUM 30 MG: 100 INJECTION SUBCUTANEOUS at 09:28

## 2024-03-27 RX ADMIN — METOPROLOL SUCCINATE 12.5 MG: 25 TABLET, EXTENDED RELEASE ORAL at 09:27

## 2024-03-27 RX ADMIN — PANTOPRAZOLE SODIUM 40 MG: 40 TABLET, DELAYED RELEASE ORAL at 07:56

## 2024-03-27 ASSESSMENT — PAIN SCALES - GENERAL
PAINLEVEL_OUTOF10: 0

## 2024-03-27 NOTE — PROGRESS NOTES
Patient discharged from [facility].  IV and tele removed from patient.  All belongings sent with patient.

## 2024-03-27 NOTE — CARE COORDINATION
8:10 AM  Dr Ocasio and ARU team to see patient today and discuss rehab. Per CM and pt conversation, patient seemed open to discussion. Patient adamantly refused SNF options.   CM following.     Electronically signed by Fareed Abreu RN, CM on 3/27/2024 at 8:11 AM.  Phone: [phone]  Fax: [phone]

## 2024-03-27 NOTE — CARE COORDINATION
Case Management Assessment            Discharge Note                    Date / Time of Note: 3/27/2024 2:09 PM                  Discharge Note Completed by: Fareed Abreu RN    Patient Name: Jen Costa   YOB: 1930  Diagnosis: Syncope and collapse [R55]  ANASTASIYA (acute kidney injury) (HCC) [N17.9]  Syncope, unspecified syncope type [R55]   Date / Time: 3/24/2024  9:18 PM    Current PCP: Hola Mattson  Clinic patient: No    Hospitalization in the last 30 days: No       Advance Directives:  Code Status: Limited  [place] DNR form completed and on chart: Yes    Financial:  Payor: MEDICARE / Plan: MEDICARE PART A AND B / Product Type: *No Product type* /      Pharmacy:    ???? [id number] [place], [place] - [address] [facility] [phone] - F [phone]  [address] [facility] [address]  Phone: [phone] Fax: [phone]      Assistance purchasing medications?: Potential Assistance Purchasing Medications: No  Assistance provided by Case Management: None at this time    Does patient want to participate in local refill/ meds to beds program?:      Meds To Beds General Rules:  1. Can ONLY be done Monday- Friday between 8:30am-5pm  2. Prescription(s) must be in pharmacy by 3pm to be filled same day  3.Copy of patient's insurance/ prescription drug card and patient face sheet must be sent along with the prescription(s)  4. Cost of Rx cannot be added to hospital bill. If financial assistance is needed, please contact unit  or ;  or  CANNOT provide pharmacy voucher for patients co-pays  5. Patients can then  the prescription on their way out of the hospital at discharge, or pharmacy can deliver to the bedside if staff is available. (payment due at time of pick-up or delivery - cash, check, or card accepted)     Able to afford home medications/ co-pay costs: Yes    ADLS:  Current PT AM-PAC Score: 18 /24  Current OT  dialogue that supports the patient's individualized plan of care/goals and shares the quality data associated with the providers. Yes    Care Transitions patient: No    Fareed Abreu RN  [facility]  Case Management Department  Ph: [phone]  Fax: [phone]

## 2024-03-27 NOTE — PLAN OF CARE
Problem: Discharge Planning  Goal: Discharge to home or other facility with appropriate resources  3/27/2024 1035 by Alfredo Solorio RN  Outcome: Progressing  Flowsheets (Taken 3/26/2024 1013)  Discharge to home or other facility with appropriate resources: Identify barriers to discharge with patient and caregiver     Problem: Pain  Goal: Verbalizes/displays adequate comfort level or baseline comfort level  3/27/2024 1035 by Alfredo Solorio RN  Outcome: Progressing  Flowsheets (Taken 3/25/2024 0611 by Evelio Barros, RN)  Verbalizes/displays adequate comfort level or baseline comfort level: Assess pain using appropriate pain scale     Problem: Safety - Adult  Goal: Free from fall injury  3/27/2024 1035 by Alfredo Solorio RN  Outcome: Progressing  Flowsheets (Taken 3/25/2024 0611 by Evelio Barros, RN)  Free From Fall Injury: Instruct family/caregiver on patient safety

## 2024-03-27 NOTE — DISCHARGE SUMMARY
INTERNAL MEDICINE DEPARTMENT  DISCHARGE SUMMARY    Patient ID: Jen Costa                                             Discharge Date: 3/27/2024   Patient's PCP: Hola Mattson                                          Discharge Physician: Charu Rogers MD MD  Admit Date: 3/24/2024   Admitting Physician: Radha Bach MD    PROBLEMS DURING HOSPITALIZATION:  Present on Admission:   Syncope, unspecified syncope type      DISCHARGE DIAGNOSES:  Syncope  L gluteus tear  ANASTASIYA    Hospital Course:      HPI:    89-year-old female with PMH of breast carcinoma, HLD, osteoarthritis, left gluteus filiberto hematoma presented with an episode of dizziness.     According to the patient, she was in her usual state of health till today.  She was having dinner in the kitchen table, when she experienced blurry vision, felt \"dizzy\".  Patient denies any loss of consciousness, fall.  However, friend reports that patient did lose consciousness for 4 minutes, and was out of it.  No diaphoresis, palpitations, chest pain, tonic-clonic movements observed.  There has been, the patient's friend called EMS.  Once EMS arrived, patient gained consciousness.  Patient denies any recent falls, prior episodes of syncope.  She did report that she feels lightheaded occasionally when she gets up in the morning, gets out of bed.  She has decreased p.o. intake, hardly eats meals because of loss of appetite since last year when she underwent TAVR/started experiencing hip pain.  She had lost 15 pounds since last year.  She only drinks minimum water with her medications.  Patient lives independently at home, she does her ADLs herself but tries to find shortcuts (gets food made).      Patient has a history of left hip cephalomedullary nail in 01/2023.  She follows up with orthopedics and has had tear of left gluteus filiberto muscle recently.  Patient was taking meloxicam for hip pain. she started experiencing worsened pain in her left hip yesterday.   She reports the pain is 9/10, does, constant, radiating from hip down to the leg, exacerbated on leaning, severely limiting mobility.  Patient denies any recent activity changes, no strenuous exercise, no trauma or falls.  She reported that she has not even recently gone for a walk, and the pain came in just like that.  Patient denies any chest pain, shortness of breath, nausea, vomiting, headache diarrhea, dysuria, no blood in urine or stool. Patient denies smoking, alcohol use, drug use.     ED course:  Temp 97.4, HR 86, RR 22, /45.  Labs revealed elevated creatinine, 1.3 from baseline of 0.7.  BNP was elevated, thousand.  Troponin elevated: 22, trended down to 19.  WBC 6.2, not elevated.  Low platelets, 127.     CXR revealed significantly improved bilateral airspace disease.  EKG revealed RBBB, unchanged from prior.     In the ED, patient was given normal saline bolus 1000 cc and Tylenol.  Patient was admitted for further evaluation    The following issues were addressed during hospitalization:    Syncope, likely orthostatic  Episode of passing out on presentation.  Blurred vision present.  No palpitations, perspiration, spinning sensation.  Cardiac source of syncope unlikely.  Patient denied straining down/pain, vasovagal unlikely.  EKG similar to prior EKG.  Patient reports that she has decreased p.o. intake, poor appetite. Patient had 1000 cc bolus of normal saline in the ED.  -Strict I's and O's  -Daily weights  -pt/ot consult  -Telemetry monitoring     Left gluteus filiberto tear  Hx of left hip cephalomedullary nail in 01/2023.  Patient follows with orthopedics.  Recent office visit on 03/06/2024, patient diagnosed with left gluteus filiberto strain with superimposed hematoma measuring 2.9 X1.3X 4.3 cm.  Likely superimposed greater trochanteric bursitis.  Patient reported worsening pain since yesterday.  Tenderness along left gluteus filiberto insertion site appreciated on exam.  Patient refuses to move LLE

## 2024-03-27 NOTE — CONSULTS
Jen Costa  3/27/2024  [id number]    Chief Complaint: Syncope, unspecified syncope type    Subjective:   This is a 89yo with a pmh including:   Past Medical History:   Diagnosis Date    Cancer (HCC)     Hepatitis     Hyperlipidemia     Osteoarthritis      Who came into the hospital with syncope. She was found to be orthostatic and dehydrated. Currently she is feeling a bit better but continues to have some weakness from her hospital stay. She is refusing ARU and wants to go home with home health.     ROS: No CP, SOB, dyspnea    Objective:  Patient Vitals for the past 24 hrs:   BP Temp Temp src Pulse Resp SpO2   03/27/24 0926 128/78 -- -- 83 -- --   03/27/24 0755 126/73 98 °F (36.7 °C) Oral 88 17 95 %   03/27/24 0508 (!) 148/71 97.9 °F (36.6 °C) Oral 92 16 97 %   03/27/24 0030 (!) 117/59 98.2 °F (36.8 °C) Oral 95 15 96 %   03/26/24 1915 125/75 97.9 °F (36.6 °C) Oral 95 16 98 %   03/26/24 1510 (!) 104/57 97.6 °F (36.4 °C) Oral 89 18 95 %   03/26/24 1149 115/62 97.6 °F (36.4 °C) Oral 77 18 98 %     Gen: No distress, pleasant.   HEENT: Normocephalic, atraumatic.   CV: No audible murmurs, well perfused extremities  Resp: No respiratory distress. No increased WOB  Abd: Soft, nontender nondistended  Ext: No edema.   Neuro: Alert, oriented, appropriately interactive.     Laboratory data: Available via EMR.     Therapy progress:    PT    Rolling: Level of difficulty - A Little   Sit to Stand from a Chair: Level of difficulty - A Little  Supine to Sit: Level of difficulty - A Little     Bed to Chair: Physical Assistance Required - A Little  Ambulate Across Room: Physical Assistance Required - A Little  Ascend 3-5 Steps With HR: Physical Assistance Required - A Little    OT    Eating: Physical Assistance Required - None  Grooming: Physical Assistance Required - A Little  LB Dressing: Physical Assistance Required - A Lot  UB Dressing: Physical Assistance Required - None  Bathing: Physical Assistance Required - A  Lot  Toileting: Physical Assistance Required - A Little    SLP         Body mass index is 22.45 kg/m².    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 Please contact me with any questions or concerns.    Rocky Ocasio D.O. M.P.H  PM&R  3/27/2024  10:39 AM      * This document was created using dictation software.  While all precautions were taken to ensure accuracy, errors may have occurred.  Please disregard any typographical errors.

## 2024-03-27 NOTE — PLAN OF CARE
Problem: Discharge Planning  Goal: Discharge to home or other facility with appropriate resources  Outcome: Progressing     Problem: Pain  Goal: Verbalizes/displays adequate comfort level or baseline comfort level  Outcome: Progressing     Problem: Skin/Tissue Integrity  Goal: Absence of new skin breakdown  Description: 1.  Monitor for areas of redness and/or skin breakdown  2.  Assess vascular access sites hourly  3.  Every 4-6 hours minimum:  Change oxygen saturation probe site  4.  Every 4-6 hours:  If on nasal continuous positive airway pressure, respiratory therapy assess nares and determine need for appliance change or resting period.  Outcome: Progressing     Problem: ABCDS Injury Assessment  Goal: Absence of physical injury  Outcome: Progressing     Problem: Safety - Adult  Goal: Free from fall injury  Outcome: Progressing     Problem: Nutrition Deficit:  Goal: Optimize nutritional status  Outcome: Progressing

## 2024-03-27 NOTE — PROGRESS NOTES
Internal Medicine Progress Note    Date: 3/27/2024   Patient: Jen Costa   [facility] Day: 2      CC: Dizziness (C/o complaints of syncopal episode and dizziness while talking with a friend. Denies sob/cp. Symptoms have since resolved. Recently started taking a new pain medication for left hip pain. No hx of seizure. )       Interval Hx   Overnight, code status concern brought up by RN which caused patient significant agitation, the code status change had been documented in the chart by the attending provider, the conversation did involve patient and family. Patient yesterday was generally confused and having difficulty recalling facts such as conversations with her orthopedist regarding her MRI results. When asked in the afternoon yesterday patient had no issues with code status as discussed and documented during shift yesterday. Patient has stated the same wishes several times to several Rns and physicians, no further need for clarification.    Patient seen and examined this AM, continues to be confused generally but has decently controlled pain.      HPI:     \"89-year-old female with PMH of breast carcinoma, HLD, osteoarthritis, left gluteus filiberto hematoma presented with an episode of dizziness.     According to the patient, she was in her usual state of health till today.  She was having dinner in the kitchen table, when she experienced blurry vision, felt \"dizzy\".  Patient denies any loss of consciousness, fall.  However, friend reports that patient did lose consciousness for 4 minutes, and was out of it.  No diaphoresis, palpitations, chest pain, tonic-clonic movements observed.  There has been, the patient's friend called EMS.  Once EMS arrived, patient gained consciousness.  Patient denies any recent falls, prior episodes of syncope.  She did report that she feels lightheaded occasionally when she gets up in the morning, gets out of bed.  She has decreased p.o. intake, hardly eats meals because of  Upon discharge: home vs ARU    Will discuss with attending physician Dr. Soler, Venkat SPEAR MD     _____________________  Charu Rogers MD   Internal Medicine Resident, PGY-1

## 2024-03-27 NOTE — CARE COORDINATION
[facility]    Patient aware and agreeable to services. Faxed orders to [facility] for SOC by 3/29    Shanelle Hernandez LPN  Care Transition Nurse  [facility]  [phone]

## 2024-03-27 NOTE — PROGRESS NOTES
Pharmacist Review and Automatic Dose Adjustment of Prophylactic Enoxaparin    The reviewing pharmacist has made an adjustment to the ordered enoxaparin dose or converted to UFH per the approved [facility] protocol and table as defined below.    Plan / Rationale: Based upon the patient's weight and renal function, the ordered dose of Enoxaparin 30 mg subQ daily has been converted to 40 mg daily.    Please call with questions--  Vadim MimsD, [facility]  Wireless: [phone]   3/27/2024 10:09 AM        Jen Costa is a 89 y.o. female.     Recent Labs     03/25/24  0532 03/26/24  0741 03/27/24  0719   CREATININE 1.1 0.9 0.7       Estimated Creatinine Clearance: 37 mL/min (based on SCr of 0.7 mg/dL).    Recent Labs     03/26/24  0741 03/27/24  0719   HGB 11.7* 11.9*   HCT 34.5* 35.9*   * 119*     No results for input(s): \"INR\" in the last 72 hours.    Height:   Ht Readings from Last 1 Encounters:   03/25/24 1.549 m (5' 1\")     Weight:  Wt Readings from Last 1 Encounters:   03/26/24 53.9 kg (118 lb 13.3 oz)

## 2024-03-27 NOTE — PROGRESS NOTES
Patient Code status was changed from a DNR-CCA to limited code on 03/25. Patient stated that they were not consulted about this change. Patient does not understand what a code status is and requested to speak to a provider about it. Efforts were made to have a provider explain and consult patient on code status with no result during day shift. RN notified resident in regards to change and requested that a provider comes to bedside to consult patient on code status. Resident stated \" I think this is something that would need to be addressed with day team. They’re the primary team and have been involved the most in patients care\". Patient was not happy with code status change and requested for a provider explantation of code status which RN notified resident of concern. Resident agreed to come to bedside. Will continue to monitor.       Update 7302:     Resident came to bedside explained code status to patient multiple times, in addition to RN. Patient stated \" I do not want all of that, that sounds unpleasent\" RN then asked \" So, when you pass you do not want us to do any actions to bring you back to life?\" Patient stated \" no I do not, all of that sounds too unpleasent.\"

## 2024-03-27 NOTE — DISCHARGE INSTRUCTIONS
Please take your medications as directed, please follow up with your PCP, cardiologist and orthopedist

## 2024-04-04 NOTE — PROGRESS NOTES
Physician Progress Note      PATIENT:               JANINE NOVAK  [facility] #:                  [id number]  :                       1930  ADMIT DATE:       3/24/2024 9:18 PM  DISCH DATE:        3/27/2024 3:14 PM  RESPONDING  PROVIDER #:        Venkat Soler MD          QUERY TEXT:    Pt admitted with Syncope.  Noted documentation of dehydration with improvement   with fluids in cardiology 3/26 PN.  If possible, please document in progress   notes and discharge summary:    The medical record reflects the following:  Risk Factors: 89 y/o female with syncope and decreased PO intake  Clinical Indicators: Per cardiology consult note: \"# Syncope-Likely secondary   to Dehydration due to decrease po intake, Symptoms improved with IV fluids, No   acute abnormalities seen on 2-D ECHO although she has Seve[place] Mitral stenosis   which was present on her ECHO done back in 2023 not new.\"  Treatment: Cardiology consult, IVF's with improvement, CXR, EKG  Options provided:  -- Syncope Likely secondary to Dehydration due to decrease po intake   confirmed, present on admission  -- Other - I will add my own diagnosis  -- Disagree - Not applicable / Not valid  -- Disagree - Clinically unable to determine / Unknown  -- Refer to Clinical Documentation Reviewer    PROVIDER RESPONSE TEXT:    The diagnosis of Syncope Likely secondary to Dehydration due to decrease po   intake was confirmed as present on admission.    Query created by: Kiah Davis on 2024 8:30 AM      Electronically signed by:  Venkat Soler MD 2024 2:43 PM

## 2024-05-30 ENCOUNTER — TELEPHONE (OUTPATIENT)
Dept: CARDIOLOGY CLINIC | Age: 89
End: 2024-05-30

## 2024-05-30 NOTE — TELEPHONE ENCOUNTER
Spoke with Jen she said she is confused to why she has to see Dr. Interiano. She said she is unavailable the 18th due to another Doctor's appt.

## 2024-05-30 NOTE — TELEPHONE ENCOUNTER
Patient new referral from Dr. Pham with podiatry for further evaluation of lower extremities. Please call patient and offer ov with Dr. Interiano.   Can offer appt in [place] 6/18 at 230 pm.

## 2024-06-03 NOTE — TELEPHONE ENCOUNTER
Jen's podiatrist Dr. Jama Pham referred her to Dr. Interiano for further peripheral work up due to her complaints of leg pain at night.   Can offer patient appt in [place] 7/10/24.   Send encounter back to me if patient would like to speak to me directly regarding referral.  Thanks

## (undated) DEVICE — SOLUTION IV 1000ML 0.9% SOD CHL

## (undated) DEVICE — ELECTRODE PT RET AD L9FT HI MOIST COND ADH HYDRGEL CORDED

## (undated) DEVICE — GLOVE ORANGE PI 7 1/2   MSG9075

## (undated) DEVICE — TOWEL,STOP FLAG GOLD N-W: Brand: MEDLINE

## (undated) DEVICE — UNDERGLOVE SURG SZ 8 BLU LTX FREE SYN POLYISOPRENE POLYMER

## (undated) DEVICE — INTERTAN LAG SCREW DRILL: Brand: TRIGEN

## (undated) DEVICE — GUIDE PIN 3.2MM X 343MM: Brand: TRIGEN

## (undated) DEVICE — SUTURE VCRL SZ 0 L27IN ABSRB UD L36MM CT-1 1/2 CIR J260H

## (undated) DEVICE — SPONGE GZ W4XL8IN COT WVN 12 PLY

## (undated) DEVICE — INTERIGHTAN 7.0MM COMPRESSION                                    SCREW STARIGHTER DRILL: Brand: TRIGEN

## (undated) DEVICE — MAT FLR W32XL58IN

## (undated) DEVICE — HIP PINNING: Brand: MEDLINE INDUSTRIES, INC.

## (undated) DEVICE — SUTURE VCRL SZ 2-0 L18IN ABSRB UD CT-1 L36MM 1/2 CIR J839D

## (undated) DEVICE — 6619 2 PTNT ISO SYS INCISE AREA&LT;(&GT;&&LT;)&GT;P: Brand: STERI-DRAPE™ IOBAN™ 2

## (undated) DEVICE — DRESSING FOAM SELF ADH 10X10 CM ABSORBENT MEPILEX BORDER FLX

## (undated) DEVICE — 3.0MM X 1000MM BALL TIP GUIDE ROD: Brand: TRIGEN

## (undated) DEVICE — 4.0MM LONG AO PILOT DRILL: Brand: TRIGEN